# Patient Record
Sex: FEMALE | Race: ASIAN | NOT HISPANIC OR LATINO | Employment: UNEMPLOYED | URBAN - METROPOLITAN AREA
[De-identification: names, ages, dates, MRNs, and addresses within clinical notes are randomized per-mention and may not be internally consistent; named-entity substitution may affect disease eponyms.]

---
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
## 2019-03-29 ENCOUNTER — OFFICE VISIT (OUTPATIENT)
Dept: FAMILY MEDICINE | Facility: CLINIC | Age: 19
End: 2019-03-29
Payer: COMMERCIAL

## 2019-03-29 VITALS
WEIGHT: 228 LBS | HEIGHT: 63 IN | DIASTOLIC BLOOD PRESSURE: 95 MMHG | BODY MASS INDEX: 40.4 KG/M2 | HEART RATE: 65 BPM | TEMPERATURE: 98.6 F | RESPIRATION RATE: 16 BRPM | SYSTOLIC BLOOD PRESSURE: 144 MMHG | OXYGEN SATURATION: 100 %

## 2019-03-29 DIAGNOSIS — L03.113 CELLULITIS OF RIGHT UPPER EXTREMITY: Primary | ICD-10-CM

## 2019-03-29 PROCEDURE — 99213 OFFICE O/P EST LOW 20 MIN: CPT | Performed by: NURSE PRACTITIONER

## 2019-03-29 RX ORDER — CEPHALEXIN 500 MG/1
500 CAPSULE ORAL 4 TIMES DAILY
Qty: 40 CAPSULE | Refills: 0 | Status: SHIPPED | OUTPATIENT
Start: 2019-03-29 | End: 2019-04-08

## 2019-03-29 SDOH — HEALTH STABILITY: MENTAL HEALTH: HOW OFTEN DO YOU HAVE A DRINK CONTAINING ALCOHOL?: NEVER

## 2019-03-29 ASSESSMENT — MIFFLIN-ST. JEOR: SCORE: 1765.39

## 2019-03-29 ASSESSMENT — PAIN SCALES - GENERAL: PAINLEVEL: NO PAIN (0)

## 2019-03-29 NOTE — PROGRESS NOTES
SUBJECTIVE:   Honey Ramirez is a 19 year old female who presents to clinic today for the following health issues:      Concern - Insect bite  Onset: 2 days ago    Description:   Insect bite: right lower arm    Intensity: mild    Progression of Symptoms:  worsening    Accompanying Signs & Symptoms:  Itching, pain, redness and swelling    Previous history of similar problem:   None    Precipitating factors:   Worsened by: none    Alleviating factors:  Improved by: ice    Therapies Tried and outcome: Ice: relieves itching    No fever or myalgias. Good appetite.    Problem list and histories reviewed & adjusted, as indicated.  Additional history: as documented    There is no problem list on file for this patient.    History reviewed. No pertinent surgical history.    Social History     Tobacco Use     Smoking status: Current Some Day Smoker     Smokeless tobacco: Never Used   Substance Use Topics     Alcohol use: No     Frequency: Never     History reviewed. No pertinent family history.      Current Outpatient Medications   Medication Sig Dispense Refill     cephALEXin (KEFLEX) 500 MG capsule Take 1 capsule (500 mg) by mouth 4 times daily for 10 days 40 capsule 0     acetaminophen-codeine (TYLENOL #3) 300-30 MG per tablet Take 1 tablet by mouth every 6 hours as needed for severe pain (Patient not taking: Reported on 3/29/2019) 12 tablet 0     No Known Allergies    Reviewed and updated as needed this visit by clinical staff  Tobacco  Allergies  Meds  Problems  Med Hx  Surg Hx  Fam Hx  Soc Hx        Reviewed and updated as needed this visit by Provider  Tobacco  Allergies  Meds  Problems  Med Hx  Surg Hx  Fam Hx         ROS:  Constitutional, HEENT, cardiovascular, pulmonary, gi and gu systems are negative, except as otherwise noted.    OBJECTIVE:     BP (!) 144/95 (BP Location: Right arm, Patient Position: Chair, Cuff Size: Adult Large)   Pulse 65   Temp 98.6  F (37  C) (Oral)   Resp 16   Ht  "1.588 m (5' 2.5\")   Wt 103.4 kg (228 lb)   LMP  (LMP Unknown)   SpO2 100%   Breastfeeding? No   BMI 41.04 kg/m    Body mass index is 41.04 kg/m .  GENERAL: healthy, alert and no distress  RESP: lungs clear to auscultation - no rales, rhonchi or wheezes  CV: regular rate and rhythm, normal S1 S2, no S3 or S4, no murmur, click or rub, no peripheral edema and peripheral pulses strong  MS: no gross musculoskeletal defects noted, no edema  SKIN: erythema to right forearm. Slightly warm. No streaking  PSYCH: mentation appears normal, affect normal/bright    Diagnostic Test Results:  none     ASSESSMENT/PLAN:     1. Cellulitis of right upper extremity  Start cephalexin 1 tablet 4 times daily x10 days  If not improving by Monday, please me know and we'll add Bactrim  - cephALEXin (KEFLEX) 500 MG capsule; Take 1 capsule (500 mg) by mouth 4 times daily for 10 days  Dispense: 40 capsule; Refill: 0    See Patient Instructions    The benefits, risks and potential side effects were discussed in detail. Black box warnings discussed as relevant. All patient questions were answered. The patient was instructed to follow up immediately if any adverse reactions develop.    Return precautions discussed, including when to seek urgent/emergent care.    Patient verbalizes understanding and agrees with plan of care. Patient stable for discharge.      CASH Jean-Baptiste [facility]  "

## 2019-03-29 NOTE — LETTER
March 29, 2019      Honey Ramirez  [address] [facility] [address]        To Whom It May Concern:    Honey Ramirez  was seen on 3/29/2019.  Please excuse her due to illness.      Sincerely,        CASH Jean-Baptiste CNP

## 2019-03-29 NOTE — PATIENT INSTRUCTIONS
Start cephalexin 1 tablet 4 times daily x10 days  If not improving by Monday, please me know and we'll add Bactrim  Patient Education     Cellulitis  Cellulitis is an infection of the deep layers of skin. A break in the skin, such as a cut or scratch, can let bacteria under the skin. If the bacteria get to deep layers of the skin, it can be serious. If not treated, cellulitis can get into the bloodstream and lymph nodes. The infection can then spread throughout the body. This causes serious illness.  Cellulitis causes the affected skin to become red, swollen, warm, and sore. The reddened areas have a visible border. An open sore may leak fluid (pus). You may have a fever, chills, and pain.  Cellulitis is treated with antibiotics taken for 7 to 10 days. An open sore may be cleaned and covered with cool wet gauze. Symptoms should get better 1 to 2 days after treatment is started. Make sure to take all the antibiotics for the full number of days until they are gone. Keep taking the medicine even if your symptoms go away.  Home care  Follow these tips:    Limit the use of the part of your body with cellulitis.     If the infection is on your leg, keep your leg raised while sitting. This will help to reduce swelling.    Take all of the antibiotic medicine exactly as directed until it is gone. Do not miss any doses, especially during the first 7 days. Don t stop taking the medicine when your symptoms get better.    Keep the affected area clean and dry.    Wash your hands with soap and warm water before and after touching your skin. Anyone else who touches your skin should also wash his or her hands. Don't share towels.  Follow-up care  Follow up with your healthcare provider, or as advised. If your infection does not go away on the first antibiotic, your healthcare provider will prescribe a different one.  When to seek medical advice  Call your healthcare provider right away if any of these occur:    Red areas that  spread    Swelling or pain that gets worse    Fluid leaking from the skin (pus)    Fever higher of 100.4  F (38.0  C) or higher after 2 days on antibiotics  Date Last Reviewed: 9/1/2016 2000-2018 [employer]. [address] [place] [address], [place] [address]. All rights reserved. This information is not intended as a substitute for professional medical care. Always follow your healthcare professional's instructions.

## 2019-04-29 ENCOUNTER — ANCILLARY PROCEDURE (OUTPATIENT)
Dept: GENERAL RADIOLOGY | Facility: CLINIC | Age: 19
End: 2019-04-29
Attending: PEDIATRICS
Payer: COMMERCIAL

## 2019-04-29 ENCOUNTER — OFFICE VISIT (OUTPATIENT)
Dept: ORTHOPEDICS | Facility: CLINIC | Age: 19
End: 2019-04-29
Payer: COMMERCIAL

## 2019-04-29 VITALS
SYSTOLIC BLOOD PRESSURE: 138 MMHG | DIASTOLIC BLOOD PRESSURE: 82 MMHG | BODY MASS INDEX: 40.4 KG/M2 | HEIGHT: 63 IN | WEIGHT: 228 LBS

## 2019-04-29 DIAGNOSIS — M25.531 RIGHT WRIST PAIN: Primary | ICD-10-CM

## 2019-04-29 DIAGNOSIS — M25.531 RIGHT WRIST PAIN: ICD-10-CM

## 2019-04-29 PROCEDURE — 99203 OFFICE O/P NEW LOW 30 MIN: CPT | Performed by: PEDIATRICS

## 2019-04-29 PROCEDURE — 73110 X-RAY EXAM OF WRIST: CPT | Mod: RT

## 2019-04-29 ASSESSMENT — MIFFLIN-ST. JEOR: SCORE: 1765.39

## 2019-04-29 NOTE — PATIENT INSTRUCTIONS
Plan:  - Today's Plan of Care:  Medical Equipment: wrist brace right    -We also discussed other future treatment options:  MRI of the wrist or Rehab: Occupational Therapy    Follow Up: 1-2 weeks    If you have any further questions for your physician or physician s care team you can call [phone] and use option 3 to leave a voice message. Calls received during business hours will be returned same day.

## 2019-04-29 NOTE — LETTER
"    4/29/2019         RE: Honey Ramirez  [address] [address] [address]        Dear Colleague,    Thank you for referring your patient, Honey Ramirez, to the [facility]. Please see a copy of my visit note below.    Sports Medicine Clinic Visit    PCP: Clinic, [facility]    Honey Ramirez is a 19 year old female who is seen  as a self referral AIC presenting with right wrist pain    Injury: Reports right wrist pain for 1 day with no injury. 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  She reports her pain increases with gripping objects and with flexion and extension. She has mild swelling in the right wrist. She is right hand dominate.    Location of Pain: right wrist  Duration of Pain: 1 day(s)  Rating of Pain at worst: 6/10  Rating of Pain Currently: 2/10  Symptoms are better with: rest  Symptoms are worse with: extension, flexion and gripping  Additional Features:   Positive: swelling and weakness   Negative: bruising, popping, grinding, catching, locking, instability, paresthesias and numbness  Other evaluation and/or treatments so far consists of: Nothing  Prior History of related problems: nothing    Social History: Host at a restaurant    Review of Systems  Skin: no bruising, yes swelling  Musculoskeletal: as above  Neurologic: no numbness, paresthesias  Remainder of review of systems is negative including constitutional, CV, pulmonary, GI, except as noted in HPI or medical history.    Patient's current problem list, past medical and surgical history, and family history were reviewed.    There is no problem list on file for this patient.    No past medical history on file.  No past surgical history on file.  No family history on file.      Objective  /82 (BP Location: Left arm, Patient Position: Chair, Cuff Size: Adult Regular)   Ht 1.588 m (5' 2.5\")   Wt 103.4 kg (228 lb)   BMI 41.04 kg/m       GENERAL APPEARANCE: healthy, alert and no " distress   GAIT: NORMAL  SKIN: no suspicious lesions or rashes  HEENT: Sclera clear, anicteric  CV: good peripheral pulses  RESP: Breathing not labored  NEURO: Normal strength and tone, mentation intact and speech normal  PSYCH:  mentation appears normal and affect normal/bright    Bilateral Wrist and Hand exam  Inspection:       No swelling, bruising or deformity bilateral    Tender:       Mild global dorsal and volar wrist pain    Non Tender:       distal radius right,      anatomic snuffbox right and      Distal ulna right    ROM:       Full and symmetric active and passive range of motion of the forearm, wrist and digits bilateral    Strength:       5/5 strength in the muscles of the hand, wrist and forearm bilateral    Special Tests:        neg (-) Tinel's test bilateral and       neg (-) Phalen's test bilateral    Neurovascular:       2+ radial pulses bilaterally with brisk capillary refill and      normal sensation to light touch in the radial, median and ulnar nerve distributions      Radiology  I ordered, visualized and reviewed these images with the patient  3 XR views of right wrist reviewed: no acute bony abnormality, no significant degenerative change  - will follow official read    Assessment:  1. Right wrist pain      Right wrist pain, overall reassuring exam and x-rays.  Recommend brace for 1-2 weeks, would consider therapy or MRI pending clinical course.    Plan:  - Today's Plan of Care:  Medical Equipment: wrist brace right    -We also discussed other future treatment options:  MRI of the wrist or Rehab: Occupational Therapy    Follow Up: 1-2 weeks    Concerning signs and symptoms were reviewed.  The patient expressed understanding of this management plan and all questions were answered at this time.    Gema Joy MD [facility]  Primary Care Sports Medicine  [facility]    Again, thank you for allowing me to participate in the care of your patient.         Sincerely,        Gema Joy MD

## 2019-04-29 NOTE — PROGRESS NOTES
"Sports Medicine Clinic Visit    PCP: Clinic, 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  She reports her pain increases with gripping objects and with flexion and extension. She has mild swelling in the right wrist. She is right hand dominate.    Location of Pain: right wrist  Duration of Pain: 1 day(s)  Rating of Pain at worst: 6/10  Rating of Pain Currently: 2/10  Symptoms are better with: rest  Symptoms are worse with: extension, flexion and gripping  Additional Features:   Positive: swelling and weakness   Negative: bruising, popping, grinding, catching, locking, instability, paresthesias and numbness  Other evaluation and/or treatments so far consists of: Nothing  Prior History of related problems: nothing    Social History: Host at a restaurant    Review of Systems  Skin: no bruising, yes swelling  Musculoskeletal: as above  Neurologic: no numbness, paresthesias  Remainder of review of systems is negative including constitutional, CV, pulmonary, GI, except as noted in HPI or medical history.    Patient's current problem list, past medical and surgical history, and family history were reviewed.    There is no problem list on file for this patient.    No past medical history on file.  No past surgical history on file.  No family history on file.      Objective  /82 (BP Location: Left arm, Patient Position: Chair, Cuff Size: Adult Regular)   Ht 1.588 m (5' 2.5\")   Wt 103.4 kg (228 lb)   BMI 41.04 kg/m      GENERAL APPEARANCE: healthy, alert and no distress   GAIT: NORMAL  SKIN: no suspicious lesions or rashes  HEENT: Sclera clear, anicteric  CV: good peripheral pulses  RESP: Breathing not labored  NEURO: Normal strength and tone, mentation intact and speech normal  PSYCH:  mentation appears normal and affect " normal/bright    Bilateral Wrist and Hand exam  Inspection:       No swelling, bruising or deformity bilateral    Tender:       Mild global dorsal and volar wrist pain    Non Tender:       distal radius right,      anatomic snuffbox right and      Distal ulna right    ROM:       Full and symmetric active and passive range of motion of the forearm, wrist and digits bilateral    Strength:       5/5 strength in the muscles of the hand, wrist and forearm bilateral    Special Tests:        neg (-) Tinel's test bilateral and       neg (-) Phalen's test bilateral    Neurovascular:       2+ radial pulses bilaterally with brisk capillary refill and      normal sensation to light touch in the radial, median and ulnar nerve distributions      Radiology  I ordered, visualized and reviewed these images with the patient  3 XR views of right wrist reviewed: no acute bony abnormality, no significant degenerative change  - will follow official read    Assessment:  1. Right wrist pain      Right wrist pain, overall reassuring exam and x-rays.  Recommend brace for 1-2 weeks, would consider therapy or MRI pending clinical course.    Plan:  - Today's Plan of Care:  Medical Equipment: wrist brace right    -We also discussed other future treatment options:  MRI of the wrist or Rehab: Occupational Therapy    Follow Up: 1-2 weeks    Concerning signs and symptoms were reviewed.  The patient expressed understanding of this management plan and all questions were answered at this time.    Gema Joy MD [facility]  Primary Care Sports Medicine  [facility]

## 2019-04-29 NOTE — LETTER
April 29, 2019      Honey Yoo Ashley  [address] [address] [address]        To Whom It May Concern,      Honey was seen for wrist pain.  Off work today, may return 4/30, please allow wrist brace use.    Follow up will be in 1-2 weeks.          Sincerely,        Gema Joy MD

## 2019-06-10 ENCOUNTER — OFFICE VISIT (OUTPATIENT)
Dept: URGENT CARE | Facility: URGENT CARE | Age: 19
End: 2019-06-10

## 2019-06-10 VITALS
BODY MASS INDEX: 41.76 KG/M2 | RESPIRATION RATE: 26 BRPM | HEART RATE: 75 BPM | WEIGHT: 232 LBS | TEMPERATURE: 98.8 F | OXYGEN SATURATION: 100 % | SYSTOLIC BLOOD PRESSURE: 160 MMHG | DIASTOLIC BLOOD PRESSURE: 93 MMHG
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
## 2019-06-10 DIAGNOSIS — M62.838 NECK MUSCLE SPASM: Primary | ICD-10-CM

## 2019-06-10 DIAGNOSIS — M62.830 BACK MUSCLE SPASM: ICD-10-CM

## 2019-06-10 PROBLEM — T75.3XXA MOTION SICKNESS: Status: ACTIVE | Noted: 2017-01-12

## 2019-06-10 PROBLEM — J45.20 ASTHMA, INTERMITTENT: Status: ACTIVE | Noted: 2019-06-10

## 2019-06-10 PROCEDURE — 99213 OFFICE O/P EST LOW 20 MIN: CPT | Performed by: PHYSICIAN ASSISTANT

## 2019-06-10 RX ORDER — CYCLOBENZAPRINE HCL 10 MG
10 TABLET ORAL 3 TIMES DAILY PRN
Qty: 30 TABLET | Refills: 0 | Status: SHIPPED | OUTPATIENT
Start: 2019-06-10 | End: 2019-12-03

## 2019-06-10 ASSESSMENT — ENCOUNTER SYMPTOMS
WEAKNESS: 0
COUGH: 0
RESPIRATORY NEGATIVE: 1
VOMITING: 0
JOINT SWELLING: 0
ENDOCRINE NEGATIVE: 1
LIGHT-HEADEDNESS: 0
ARTHRALGIAS: 1
NAUSEA: 0
BRUISES/BLEEDS EASILY: 0
FEVER: 0
DIARRHEA: 0
WOUND: 0
HEMATOLOGIC/LYMPHATIC NEGATIVE: 1
SORE THROAT: 0
SHORTNESS OF BREATH: 0
CHILLS: 0
EYES NEGATIVE: 1
NECK PAIN: 1
RHINORRHEA: 0
ALLERGIC/IMMUNOLOGIC NEGATIVE: 1
MYALGIAS: 1
BACK PAIN: 1
PALPITATIONS: 0
NECK STIFFNESS: 0
DIZZINESS: 0
CARDIOVASCULAR NEGATIVE: 1
HEADACHES: 0
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
## 2019-07-29 ENCOUNTER — TELEPHONE (OUTPATIENT)
Dept: FAMILY MEDICINE | Facility: CLINIC | Age: 19
End: 2019-07-29

## 2019-07-29 NOTE — TELEPHONE ENCOUNTER
Panel Management Review   One phone call and send letter if unable to reach them or [employer] message and send letter if not read after 2 weeks (You will get a message to your inbasket)      BP Readings from Last 1 Encounters:   06/10/19 (!) 160/93    , No results found for: A1C, 3/29/2019    Health Maintenance Due   Topic Date Due     PREVENTIVE CARE VISIT  1999     CHLAMYDIA SCREENING  1999     ASTHMA ACTION PLAN  1999     ASTHMA CONTROL TEST  1999     HIV SCREENING  02/19/2014        Fail List measure: asthma         Patient is due/failing the following:   ACT    Action needed:   Patient needs to do ACT.    Type of outreach:    Copy of ACT mailed to patient, will reach out in 5 days.    Questions for provider review:    None                                                                                       Chart routed to n/jose White

## 2019-07-29 NOTE — LETTER
July 29, 2019      Honey Ramirez  [address] [address] [address]        Dear Honey Ramirez,      At [facility] we care about your health and are committed to providing quality patient care. It has come to our attention that you are due for an Asthma Control Test.     This screening tool helps us to assess how well your asthma is controlled. Good asthma control leads to less asthma symptoms and greater health. If your asthma is not in good control (score less than 20) it is recommended you be seen by your provider for medication and lifestyle adjustments    We have enclosed an  Asthma Control Test. Please complete the enclosed asthma control test even if you are feeling well. You can mail it back to our clinic or drop if off at our .     Thank you for your time and we hope this letter finds you well!      Sincerely,    Your Team at [facility]

## 2019-08-13 ASSESSMENT — ASTHMA QUESTIONNAIRES: ACT_TOTALSCORE: 23

## 2019-10-03 ENCOUNTER — HEALTH MAINTENANCE LETTER (OUTPATIENT)
Age: 19
End: 2019-10-03

## 2019-12-03 ENCOUNTER — OFFICE VISIT (OUTPATIENT)
Dept: OBGYN | Facility: CLINIC | Age: 19
End: 2019-12-03
Payer: COMMERCIAL

## 2019-12-03 VITALS
SYSTOLIC BLOOD PRESSURE: 123 MMHG | WEIGHT: 223 LBS | HEART RATE: 69 BPM | DIASTOLIC BLOOD PRESSURE: 80 MMHG | BODY MASS INDEX: 40.14 KG/M2

## 2019-12-03 DIAGNOSIS — Z34.01 ENCOUNTER FOR SUPERVISION OF NORMAL FIRST PREGNANCY IN FIRST TRIMESTER: ICD-10-CM

## 2019-12-03 DIAGNOSIS — N91.2 AMENORRHEA: Primary | ICD-10-CM

## 2019-12-03 LAB
B-HCG SERPL-ACNC: ABNORMAL IU/L (ref 0–5)
HCG UR QL: POSITIVE

## 2019-12-03 PROCEDURE — 99201 ZZC OFFICE/OUTPT VISIT, NEW, LEVEL I: CPT | Performed by: ADVANCED PRACTICE MIDWIFE

## 2019-12-03 PROCEDURE — 84702 CHORIONIC GONADOTROPIN TEST: CPT | Performed by: ADVANCED PRACTICE MIDWIFE

## 2019-12-03 PROCEDURE — 81025 URINE PREGNANCY TEST: CPT | Performed by: ADVANCED PRACTICE MIDWIFE

## 2019-12-03 PROCEDURE — 36415 COLL VENOUS BLD VENIPUNCTURE: CPT | Performed by: ADVANCED PRACTICE MIDWIFE

## 2019-12-03 NOTE — NURSING NOTE
"Chief Complaint   Patient presents with     Confirmation Of Pregnancy       Initial /80 (BP Location: Right arm, Patient Position: Sitting, Cuff Size: Adult Large)   Pulse 69   Wt 101.2 kg (223 lb)   LMP 10/01/2019 (Approximate)   BMI 40.14 kg/m   Estimated body mass index is 40.14 kg/m  as calculated from the following:    Height as of 4/29/19: 1.588 m (5' 2.5\").    Weight as of this encounter: 101.2 kg (223 lb).  Medication Reconciliation: complete    Angela Ortiz CMA    "

## 2019-12-03 NOTE — PROGRESS NOTES
Honey Ramirez is a 19 year old who presents to the clinic for confirmation of pregnancy.   Patient's last menstrual period was 10/01/2019 (approximate).  Normally menses are every 28 days.  Has had episodes of amenorrhea in the past but has had monthly menses since going off BC one year ago.   First positive pregnancy test at home was December 3, 2019   Had a negative pregnancy test 11/1.  Partner is choosing not to be involved and is encouraging termination which Honey is apposed to.   She has support from her family.   Worried about the stress this is causing.   Some issues with nausea and vomiting though states that she isn't throwing up food but can vomit 5-6 times a day.   Thinks weight loss is perhaps 7 lbs.            Patient Active Problem List   Diagnosis     Asthma, intermittent     Class 3 obesity due to excess calories without serious comorbidity with body mass index (BMI) of 40.0 to 44.9 in adult     Motion sickness     Amenorrhea, secondary     Past Medical History:   Diagnosis Date     Amenorrhea      Asthma      Past Surgical History:   Procedure Laterality Date     NO HISTORY OF SURGERY       No current outpatient medications on file.     No Known Allergies  Social History     Socioeconomic History     Marital status: Single     Spouse name: Not on file     Number of children: Not on file     Years of education: Not on file     Highest education level: Not on file   Occupational History     Not on file   Social Needs     Financial resource strain: Not on file     Food insecurity:     Worry: Not on file     Inability: Not on file     Transportation needs:     Medical: Not on file     Non-medical: Not on file   Tobacco Use     Smoking status: Never Smoker     Smokeless tobacco: Former User     Tobacco comment: stopped vaping 11/2019   Substance and Sexual Activity     Alcohol use: No     Frequency: Never     Drug use: No     Sexual activity: Yes     Partners: Male     Birth control/protection:  None   Lifestyle     Physical activity:     Days per week: Not on file     Minutes per session: Not on file     Stress: Not on file   Relationships     Social connections:     Talks on phone: Not on file     Gets together: Not on file     Attends Rastafarian service: Not on file     Active member of club or organization: Not on file     Attends meetings of clubs or organizations: Not on file     Relationship status: Not on file     Intimate partner violence:     Fear of current or ex partner: Not on file     Emotionally abused: Not on file     Physically abused: Not on file     Forced sexual activity: Not on file   Other Topics Concern     Parent/sibling w/ CABG, MI or angioplasty before 65F 55M? Not Asked   Social History Narrative     Not on file     Family History   Problem Relation Age of Onset     Diabetes Mother      Diabetes Father      Polycystic ovary syndrome Sister      Chronic Obstructive Pulmonary Disease Maternal Grandfather      Polycystic ovary syndrome Sister         ROS: 10 point ROS neg other than the symptoms noted above in the HPI.          /80 (BP Location: Right arm, Patient Position: Sitting, Cuff Size: Adult Large)   Pulse 69   Wt 101.2 kg (223 lb)   LMP 10/01/2019 (Approximate)   BMI 40.14 kg/m          UPT today is positive    ASSESSMENT:        (N91.2) Amenorrhea  (primary encounter diagnosis)  Comment:   Plan: HCG Qual, Urine ([id number]), HCG Quantitative         Pregnancy, Blood ([id number]), US OB < 14 Weeks         Single            (Z34.01) Encounter for supervision of normal first pregnancy in first trimester  Comment:   Plan: US OB < 14 Weeks Single                  We reviewed:  CMV  Listeria precautions  Zika   Nausea remedies (see AVS for complete instructions)    Recommended PNV daily and following a generally healthy lifestyle.  Ultrasound at 7 weeks.   To call with protracted n/v or vaginal bleeding  Follow up visit at 8-10 weeks gestation    Dating is uncertain given her  negative pregnancy test one month ago.   Will plan beta and ultrasound to confirm.     RTC one week for a weight check.  Reviewed nausea remedies on AVS.

## 2019-12-03 NOTE — PATIENT INSTRUCTIONS
Thank for choosing our clinic for your health care needs. Our goal is to provided you with excellent care. One way that we continue to improve our care is by listening to our patients. Please take a few minutes to complete the written survey that you may receive in the mail after your visit.     You may reach your care team by calling the following number:    [place]- [phone]  [place] [phone]  For clinic hours at [facility]  please visit the [facility] web site [email or web address]    Notification of your lab results:  Normal or non-critical lab and imaging results will be communicated to you by Mychart, letter, or phone within 7 days. If you do not hear from us within 10 days, please contact us through [employer] or phone. If you have a critical or abnormal lab result, we will notify you by phone as soon as possible.  Pap smears often take a bit longer.     After Hours nurse advice:  [facility]:  [phone]     Medication Refills:  Please contact your pharmacy and they will forward the refill to us. Please allow 3 business days for your refills to be completed.     Secure access to your medical record:  Use [employer] (secure email communication and access to your chart) to send your primary care provider a message or make an appointment. Ask someone on your Team how to sign up for [employer]. To log on to [employer] or for more information in [employer] please visit the website at [email or web address].            How to prevent CMV or cytomegalovirus infection while you are pregnant:    Thoroughly wash your hands with soap and warm water especially after doing things such as:  Diaper changes  Feeding or bathing a child  Wiping a child's runny nose or drool  Handling a child's toys    Do not share cups, plates, utensils, toothbrushes or food with your children  Do not kiss young children on the mouth or cheek. Instead, kiss them on the head or give them a hug.  Do not share towels or  washcloths with young children.  Clean toy, counter tops, and other surfaces that come in contact with urine or saliva.        LISTERIA  Individuals can reduce the risk for listeria contamination through proper food selection, handling, and storage, such as:    Rinsing raw produce (fuits and vegetables) before eating, cutting, or cooking;    Keeping refrigerators at 40 degrees F or lower;    Buying soft cheeses only if their labels state that they are made with pasteurized milk.  Also avoiding cheese that has not been initially wrapped in plastic.  These cheeses include brie, camembert, blue and the soft Mexican cheeses like con queso.    Heating all food that can be heated but especially hot dogs, luncheon meats, and cold cuts to an internal temperature of 165 degrees F or until steaming hot before serving them; and    Washing your hands for at least 20 seconds with warm water and soap before and after handling cantaloupes or other melons.    Watch for food recalls for listeria and contact us if you believe you have been exposed.               First line remedies for nausea in pregnancy    Eat small frequent meals every 2-3 hours if possible.   Avoid food at extremes of temparture and drinks with carbination.  Eat foods that appeal to you, avoiding fats and spicy foods.  Bread, pasta, crackers, potatoes, and rice tend to be tolerated the best.  Don't worry about what you eat in the first 3 months, it is more important that you can eat and keep it down.   Try flat ginger ale.  Ginger is a herbal remedy for nausea and you can use it in any form.  There are ginger tablets you can purchase.  The dose is 500 to 1000 mg a day.   You may also try doxylamine 12.5 mg three times a day which is a sleeping medication along with Vitamin B6 25 mg three times a day.  This combination takes up to a week to work so give it some time.  Be sure to take both the doxylamine and B6  Doxylamine is sometimes called Unisom and it comes in  25 mg tablets so you will have to break it in half and take half a tablet.   It is important to take the Unisom and B6 together or it won't be effective.  If you begin to vomit more than 5 or 6 times a day and feel that you are unable to keep anything down, call the clinic for an appointment to be seen.                Fruits and vegetables high in pesticide contamination  Strawberries  Spinach  Kale  Nectarines  Peaches  Apples  Grapes  Cherries  Pears  Tomatoes  Celery  Potatoes  Hot Peppers.     Consider extra washing of these fruits and vegetables, peeling if possible or purchasing organics.     Fruits and vegetables lowest if pesticide contramination:  Avocado  Sweet corn  Pineapple  Cabbage   Onions   Frozen peas  Papaya  Asparagus  Mushrooms  Eggplant  Honeydew  Kiwi  Cantaloupe  Cauliflower  Broccoli      Consider eliminating or reducing the following additives in personal care products and make up:  Triclosan  Paraben  Phthalates  Phenols  Products that do not contain these additives are often found in the organic or green sections of stores.

## 2019-12-12 ENCOUNTER — ANCILLARY PROCEDURE (OUTPATIENT)
Dept: ULTRASOUND IMAGING | Facility: CLINIC | Age: 19
End: 2019-12-12
Attending: ADVANCED PRACTICE MIDWIFE
Payer: COMMERCIAL

## 2019-12-12 DIAGNOSIS — Z34.01 ENCOUNTER FOR SUPERVISION OF NORMAL FIRST PREGNANCY IN FIRST TRIMESTER: ICD-10-CM

## 2019-12-12 DIAGNOSIS — N91.2 AMENORRHEA: ICD-10-CM

## 2019-12-12 PROCEDURE — 76801 OB US < 14 WKS SINGLE FETUS: CPT

## 2019-12-20 ENCOUNTER — TELEPHONE (OUTPATIENT)
Dept: OBGYN | Facility: CLINIC | Age: 19
End: 2019-12-20

## 2019-12-20 NOTE — TELEPHONE ENCOUNTER
Reason for Call:  Other order for an MRI    Detailed comments: patient is calling to ask for a MRI order to check the gender for the baby    Phone Number Patient can be reached at: Home number on file [phone] (home)    Best Time:     Can we leave a detailed message on this number? YES    Call taken on 12/20/2019 at 8:59 AM by Suzette Obando

## 2019-12-20 NOTE — TELEPHONE ENCOUNTER
Pt was seen on 12/3/19 with Lizzy Guzmán CNM, for a pregnancy confirmation appt.  Lizzy did order pt an US OB <14 weeks which was completed on 12/12/19.  No new prenatal visit scheduled.    Left pt a detailed message letting her know we do not order MRI's to determine her baby's gender.  I explained to her that her OB provider will order a 20 week ultrasound and she can find out the gender at that time.  I also advised pt to schedule a new prenatal appointment for when she is 10-12 weeks gestation.    Charlotte Hirsch RN

## 2019-12-26 ENCOUNTER — PRENATAL OFFICE VISIT (OUTPATIENT)
Dept: OBGYN | Facility: CLINIC | Age: 19
End: 2019-12-26
Payer: COMMERCIAL

## 2019-12-26 VITALS
WEIGHT: 225 LBS | DIASTOLIC BLOOD PRESSURE: 85 MMHG | HEART RATE: 74 BPM | SYSTOLIC BLOOD PRESSURE: 125 MMHG | BODY MASS INDEX: 40.5 KG/M2

## 2019-12-26 DIAGNOSIS — Z34.00 SUPERVISION OF NORMAL FIRST PREGNANCY, ANTEPARTUM: ICD-10-CM

## 2019-12-26 LAB
ABO + RH BLD: NORMAL
ABO + RH BLD: NORMAL
BASOPHILS # BLD AUTO: 0 10E9/L (ref 0–0.2)
BASOPHILS NFR BLD AUTO: 0.3 %
BLD GP AB SCN SERPL QL: NORMAL
BLOOD BANK CMNT PATIENT-IMP: NORMAL
DIFFERENTIAL METHOD BLD: NORMAL
EOSINOPHIL # BLD AUTO: 0.1 10E9/L (ref 0–0.7)
EOSINOPHIL NFR BLD AUTO: 1.5 %
ERYTHROCYTE [DISTWIDTH] IN BLOOD BY AUTOMATED COUNT: 12.2 % (ref 10–15)
HBV SURFACE AG SERPL QL IA: NONREACTIVE
HCT VFR BLD AUTO: 38.5 % (ref 35–47)
HGB BLD-MCNC: 12.7 G/DL (ref 11.7–15.7)
HIV 1+2 AB+HIV1 P24 AG SERPL QL IA: NONREACTIVE
LYMPHOCYTES # BLD AUTO: 1.9 10E9/L (ref 0.8–5.3)
LYMPHOCYTES NFR BLD AUTO: 20.1 %
MCH RBC QN AUTO: 29.2 PG (ref 26.5–33)
MCHC RBC AUTO-ENTMCNC: 33 G/DL (ref 31.5–36.5)
MCV RBC AUTO: 89 FL (ref 78–100)
MONOCYTES # BLD AUTO: 0.7 10E9/L (ref 0–1.3)
MONOCYTES NFR BLD AUTO: 7.1 %
NEUTROPHILS # BLD AUTO: 6.8 10E9/L (ref 1.6–8.3)
NEUTROPHILS NFR BLD AUTO: 71 %
PLATELET # BLD AUTO: 299 10E9/L (ref 150–450)
RBC # BLD AUTO: 4.35 10E12/L (ref 3.8–5.2)
SPECIMEN EXP DATE BLD: NORMAL
WBC # BLD AUTO: 9.6 10E9/L (ref 4–11)

## 2019-12-26 PROCEDURE — 86901 BLOOD TYPING SEROLOGIC RH(D): CPT | Performed by: NURSE PRACTITIONER

## 2019-12-26 PROCEDURE — 87389 HIV-1 AG W/HIV-1&-2 AB AG IA: CPT | Performed by: NURSE PRACTITIONER

## 2019-12-26 PROCEDURE — 86780 TREPONEMA PALLIDUM: CPT | Performed by: NURSE PRACTITIONER

## 2019-12-26 PROCEDURE — 36415 COLL VENOUS BLD VENIPUNCTURE: CPT | Performed by: NURSE PRACTITIONER

## 2019-12-26 PROCEDURE — 86850 RBC ANTIBODY SCREEN: CPT | Performed by: NURSE PRACTITIONER

## 2019-12-26 PROCEDURE — 99207 ZZC FIRST OB VISIT: CPT | Performed by: NURSE PRACTITIONER

## 2019-12-26 PROCEDURE — 87340 HEPATITIS B SURFACE AG IA: CPT | Performed by: NURSE PRACTITIONER

## 2019-12-26 PROCEDURE — 87086 URINE CULTURE/COLONY COUNT: CPT | Performed by: NURSE PRACTITIONER

## 2019-12-26 PROCEDURE — 86900 BLOOD TYPING SEROLOGIC ABO: CPT | Performed by: NURSE PRACTITIONER

## 2019-12-26 PROCEDURE — 87591 N.GONORRHOEAE DNA AMP PROB: CPT | Performed by: NURSE PRACTITIONER

## 2019-12-26 PROCEDURE — 86762 RUBELLA ANTIBODY: CPT | Performed by: NURSE PRACTITIONER

## 2019-12-26 PROCEDURE — 87491 CHLMYD TRACH DNA AMP PROBE: CPT | Performed by: NURSE PRACTITIONER

## 2019-12-26 PROCEDURE — 85025 COMPLETE CBC W/AUTO DIFF WBC: CPT | Performed by: NURSE PRACTITIONER

## 2019-12-26 RX ORDER — PNV NO.95/FERROUS FUM/FOLIC AC 28MG-0.8MG
1 TABLET ORAL DAILY
COMMUNITY
End: 2020-09-01

## 2019-12-26 NOTE — PROGRESS NOTES
Honey is a 19 year old  @ 10 weeks here for new OB visit. FOB is did not desire for patient to continue pregnancy, so is not currently involved.    See OB questionnaire for pertinent components of HPI.    OBhx: never pregnant  Gyne: history of STD: Chlamydia x 2  Past Medical History:   Diagnosis Date     Amenorrhea      Asthma      Past Surgical History:   Procedure Laterality Date     NO HISTORY OF SURGERY       Patient Active Problem List    Diagnosis Date Noted     Pregnancy, supervision, normal, first 2019     Priority: Medium     Asthma, intermittent 06/10/2019     Priority: Medium     Class 3 obesity due to excess calories without serious comorbidity with body mass index (BMI) of 40.0 to 44.9 in adult 2017     Priority: Medium     Motion sickness 2017     Priority: Medium     Amenorrhea, secondary 2017     Priority: Medium     Overview:   Refer her to ob/gyn        No Known Allergies  Current Outpatient Medications   Medication Sig Dispense Refill     Prenatal Vit-Fe Fumarate-FA (PRENATAL VITAMIN) 27-0.8 MG TABS Take 1 tablet by mouth daily         Review of Systems:     CONSTITUTIONAL:NEGATIVE for fever, chills, change in weight  INTEGUMENTARY/SKIN: NEGATIVE for worrisome rashes, moles or lesions  EYES: NEGATIVE for vision changes or irritation  ENT/MOUTH: NEGATIVE for ear, mouth and throat problems  RESP:NEGATIVE for significant cough or SOB  BREAST: NEGATIVE for masses, tenderness or discharge  CV: NEGATIVE for chest pain, palpitations or peripheral edema  GI: NEGATIVE for abdominal pain, heartburn, or change in bowel habits and POSITIVE for nausea-improving in the last week  :  Denies current vaginal bleeding, abnormal vaginal discharge  NEURO: NEGATIVE for weakness, dizziness or paresthesias  HEME/ALLERGY/IMMUNE: NEGATIVE for bleeding problems  PSYCHIATRIC: NEGATIVE for changes in mood or affect      Past Medical History of Father of Baby: No significant medical  history  History Since Last Menstrual Period: Nausea and Vomiting-improving    Physical Exam: /85   Pulse 74   Wt 102.1 kg (225 lb)   LMP 10/01/2019 (Approximate)   Breastfeeding No   BMI 40.50 kg/m    General: Well developed, well nourished female and Obese  Skin: Normal  HEENT: Normal  Neck: Supple,no adenopathy,thyroid normal  Chest: Clear to auscultation  Heart: Regular rate, rhythm,No murmur, rub, gallop  Abdomen: Benign and No masses, organomegaly    Extremities: Normal  Neurological: Normal   Perineum: Intact   Vulva: Normal  Vagina: Normal mucosa, no discharge  Cervix: Nulliparous, closed, mobile,  no discharge  Uterus: 10 weeks, Normal shape, position and consistency   Adnexa: Normal  Bony Pelvis: Adequate     A/P 19 year old  at 10 weeks  Discussed physician coverage, tertiary support, diet, exercise, weight gain, schedule of visits, routine and indicated ultrasounds, and childbirth education.  Prenatal labs: Prenatal Panel, GC, Chlamydia, Urine Culture.  Continue taking prenatal vitamins  Discussed maternal quad screening between 15-20 weeks and reviewed benefits and limitations.  Current BMI 40.5-monitor in the event anesthesia referral needed.     Kinsey CASTREJON CNP

## 2019-12-27 LAB
BACTERIA SPEC CULT: NORMAL
C TRACH DNA SPEC QL NAA+PROBE: NEGATIVE
N GONORRHOEA DNA SPEC QL NAA+PROBE: NEGATIVE
RUBV IGG SERPL IA-ACNC: 29 IU/ML
SPECIMEN SOURCE: NORMAL
T PALLIDUM AB SER QL: NONREACTIVE

## 2020-01-16 ENCOUNTER — PRENATAL OFFICE VISIT (OUTPATIENT)
Dept: OBGYN | Facility: CLINIC | Age: 20
End: 2020-01-16
Payer: COMMERCIAL

## 2020-01-16 VITALS
BODY MASS INDEX: 39.69 KG/M2 | HEART RATE: 75 BPM | DIASTOLIC BLOOD PRESSURE: 85 MMHG | TEMPERATURE: 98.4 F | WEIGHT: 224 LBS | OXYGEN SATURATION: 98 % | HEIGHT: 63 IN | SYSTOLIC BLOOD PRESSURE: 132 MMHG

## 2020-01-16 DIAGNOSIS — Z23 NEED FOR PROPHYLACTIC VACCINATION AND INOCULATION AGAINST INFLUENZA: ICD-10-CM

## 2020-01-16 DIAGNOSIS — Z34.00 SUPERVISION OF NORMAL FIRST PREGNANCY, ANTEPARTUM: Primary | ICD-10-CM

## 2020-01-16 PROCEDURE — 90471 IMMUNIZATION ADMIN: CPT | Performed by: NURSE PRACTITIONER

## 2020-01-16 PROCEDURE — 90686 IIV4 VACC NO PRSV 0.5 ML IM: CPT | Performed by: NURSE PRACTITIONER

## 2020-01-16 PROCEDURE — 99207 ZZC PRENATAL VISIT: CPT | Performed by: NURSE PRACTITIONER

## 2020-01-16 ASSESSMENT — MIFFLIN-ST. JEOR: SCORE: 1755.19

## 2020-01-16 ASSESSMENT — PAIN SCALES - GENERAL: PAINLEVEL: NO PAIN (0)

## 2020-01-16 NOTE — PROGRESS NOTES
Patient presents for routine prenatal visit. Prenatal flowsheet reviewed and updated as needed.  Denies vaginal bleeding, loss of fluid, contractions or cramping.  FOB is present for visit today.  Patient without complaint. On return to clinic discussed option for Maternal Quad screening and will then order screening ultrasound.  We discussed seasonal influenza vaccination and recommendations. At this time, patient accepts vaccination.    Advice as per Anticipatory Guidance/Checklist updated.  PE: See OB vitals    Questions asked and answered. Next OB visit in 4 week(s) with Dr. Leonardo.    Kinsey CASTREJON CNP

## 2020-01-21 ENCOUNTER — OFFICE VISIT (OUTPATIENT)
Dept: OBGYN | Facility: CLINIC | Age: 20
End: 2020-01-21
Payer: COMMERCIAL

## 2020-01-21 VITALS
HEART RATE: 82 BPM | WEIGHT: 223 LBS | DIASTOLIC BLOOD PRESSURE: 82 MMHG | SYSTOLIC BLOOD PRESSURE: 118 MMHG | BODY MASS INDEX: 39.5 KG/M2

## 2020-01-21 DIAGNOSIS — K59.01 SLOW TRANSIT CONSTIPATION: Primary | ICD-10-CM

## 2020-01-21 DIAGNOSIS — Z34.02 ENCOUNTER FOR SUPERVISION OF NORMAL FIRST PREGNANCY IN SECOND TRIMESTER: ICD-10-CM

## 2020-01-21 PROCEDURE — 99207 ZZC PRENATAL VISIT: CPT | Performed by: ADVANCED PRACTICE MIDWIFE

## 2020-01-21 NOTE — PATIENT INSTRUCTIONS
Drink 8-10 glasses of water a day.   Try to walk or exercise daily.   Avoid foods that will constipate like bananas, apples, tea and rice.  Stop prenatal vitamin for now.   For immediate relief try an fleet enema or glycerine or dulcolax suppository.   For over night relief try senokot or dulcolax tablets.   Consider taking miralax every 1-3 days to keep stools sort and easy to pass.

## 2020-01-21 NOTE — NURSING NOTE
"Chief Complaint   Patient presents with     Prenatal Care     abdominal and low back pain       Initial /82 (BP Location: Right arm, Patient Position: Sitting, Cuff Size: Adult Large)   Pulse 82   Wt 93.4 kg (206 lb)   LMP 10/01/2019 (Approximate)   BMI 36.49 kg/m   Estimated body mass index is 36.49 kg/m  as calculated from the following:    Height as of 1/16/20: 1.6 m (5' 3\").    Weight as of this encounter: 93.4 kg (206 lb).  Medication Reconciliation: complete    Angela Ortiz CMA    "

## 2020-01-21 NOTE — PROGRESS NOTES
Here with complaints of cramping and back pain.   Has been very constipated.  Feels often like she has to go but goes small amounts that are hard and hard to pass.   Discussed diet and options for relief.   Will do quad but is too early.   Has appointment for next month and will do it then.   RTC 3 weeks.   Lizzy Guzmán, CASH, CNM

## 2020-01-21 NOTE — LETTER
January 21, 2020      RE: Honey Ramirez  [address] [facility] [address]       To whom it may concern:    Honey Ramirez was seen in our clinic today. She  may return to work 1/27.  Sincerely,      Lizzy Guzmán CNM

## 2020-02-13 ENCOUNTER — PRENATAL OFFICE VISIT (OUTPATIENT)
Dept: OBGYN | Facility: CLINIC | Age: 20
End: 2020-02-13
Payer: COMMERCIAL

## 2020-02-13 VITALS
OXYGEN SATURATION: 98 % | WEIGHT: 220 LBS | HEIGHT: 63 IN | BODY MASS INDEX: 38.98 KG/M2 | HEART RATE: 69 BPM | SYSTOLIC BLOOD PRESSURE: 104 MMHG | DIASTOLIC BLOOD PRESSURE: 65 MMHG | TEMPERATURE: 97.8 F

## 2020-02-13 DIAGNOSIS — Z34.02 ENCOUNTER FOR SUPERVISION OF NORMAL FIRST PREGNANCY IN SECOND TRIMESTER: Primary | ICD-10-CM

## 2020-02-13 PROCEDURE — 59425 ANTEPARTUM CARE ONLY: CPT | Performed by: NURSE PRACTITIONER

## 2020-02-13 PROCEDURE — 99207 ZZC PRENATAL VISIT: CPT | Performed by: NURSE PRACTITIONER

## 2020-02-13 ASSESSMENT — MIFFLIN-ST. JEOR: SCORE: 1737.04

## 2020-02-13 ASSESSMENT — PAIN SCALES - GENERAL: PAINLEVEL: NO PAIN (0)

## 2020-02-13 NOTE — PROGRESS NOTES
Patient presents for routine prenatal visit. Prenatal flowsheet reviewed and updated as needed.  Denies vaginal bleeding, loss of fluid, contractions or cramping.  Was seen for constipation, but this is improving. Had been having some back pain and cramping, but this is also improving. We discussed some additional relief measures to try and also symptoms for which to monitor.  Screening ultrasound ordered.   Had been planning Quad screen, but has changed her mind and declines this now.  Advice as per Anticipatory Guidance/Checklist updated.  PE: See OB vitals    Questions asked and answered. Next OB visit in 4 week(s) with Dr. Leonardo.    Kinsey CASTREJON CNP

## 2020-03-06 ENCOUNTER — ANCILLARY PROCEDURE (OUTPATIENT)
Dept: ULTRASOUND IMAGING | Facility: CLINIC | Age: 20
End: 2020-03-06
Attending: NURSE PRACTITIONER
Payer: COMMERCIAL

## 2020-03-06 DIAGNOSIS — Z34.02 ENCOUNTER FOR SUPERVISION OF NORMAL FIRST PREGNANCY IN SECOND TRIMESTER: ICD-10-CM

## 2020-03-06 PROCEDURE — 76805 OB US >/= 14 WKS SNGL FETUS: CPT

## 2020-03-11 ENCOUNTER — PRENATAL OFFICE VISIT (OUTPATIENT)
Dept: OBGYN | Facility: CLINIC | Age: 20
End: 2020-03-11
Payer: COMMERCIAL

## 2020-03-11 VITALS
DIASTOLIC BLOOD PRESSURE: 78 MMHG | OXYGEN SATURATION: 97 % | TEMPERATURE: 98.3 F | HEIGHT: 63 IN | BODY MASS INDEX: 40.36 KG/M2 | SYSTOLIC BLOOD PRESSURE: 117 MMHG | WEIGHT: 227.8 LBS | HEART RATE: 76 BPM

## 2020-03-11 DIAGNOSIS — Z34.02 ENCOUNTER FOR SUPERVISION OF NORMAL FIRST PREGNANCY IN SECOND TRIMESTER: Primary | ICD-10-CM

## 2020-03-11 PROCEDURE — 99207 ZZC PRENATAL VISIT: CPT | Performed by: NURSE PRACTITIONER

## 2020-03-11 ASSESSMENT — MIFFLIN-ST. JEOR: SCORE: 1767.42

## 2020-03-11 ASSESSMENT — PAIN SCALES - GENERAL: PAINLEVEL: NO PAIN (0)

## 2020-03-11 NOTE — PROGRESS NOTES
Patient presents for routine prenatal visit. Prenatal flowsheet reviewed and updated as needed.  Denies vaginal bleeding, loss of fluid, contractions or cramping.  Patient without complaint. Labs on return to clinic.   Discussed ultrasound.  Advice as per Anticipatory Guidance/Checklist updated.  PE: See OB vitals    Questions asked and answered. Next OB visit in 4 week(s) with Dr. Leonardo.    Kinesy CASTREJON CNP

## 2020-04-08 ENCOUNTER — VIRTUAL VISIT (OUTPATIENT)
Dept: OBGYN | Facility: CLINIC | Age: 20
End: 2020-04-08

## 2020-04-08 DIAGNOSIS — Z34.02 ENCOUNTER FOR SUPERVISION OF NORMAL FIRST PREGNANCY IN SECOND TRIMESTER: Primary | ICD-10-CM

## 2020-04-08 PROBLEM — N91.1 AMENORRHEA, SECONDARY: Status: ACTIVE | Noted: 2017-01-12

## 2020-04-08 PROBLEM — Z34.00 PREGNANCY, SUPERVISION, NORMAL, FIRST: Status: ACTIVE | Noted: 2019-12-26

## 2020-04-08 PROCEDURE — 99207 ZZC PRENATAL VISIT: CPT | Mod: TEL | Performed by: OBSTETRICS & GYNECOLOGY

## 2020-04-08 NOTE — PROGRESS NOTES
"Honey Ramirez is a 20 year old female who is being evaluated via a billable telephone visit.      The patient has been notified of following:     \"This telephone visit will be conducted via a call between you and your physician/provider. We have found that certain health care needs can be provided without the need for a physical exam.  This service lets us provide the care you need with a short phone conversation.  If a prescription is necessary we can send it directly to your pharmacy.  If lab work is needed we can place an order for that and you can then stop by our lab to have the test done at a later time.    Telephone visits are billed at different rates depending on your insurance coverage. During this emergency period, for some insurers they may be billed the same as an in-person visit.  Please reach out to your insurance provider with any questions.    If during the course of the call the physician/provider feels a telephone visit is not appropriate, you will not be charged for this service.\"    Patient has given verbal consent for Telephone visit?  Yes    Honey Ramirez complains of    Chief Complaint   Patient presents with     Prenatal Care     25w1d       I have reviewed and updated the patient's Past Medical History, Social History, Family History and Medication List.    ALLERGIES  Patient has no known allergies.    Additional provider notes:   No significant signs, symptoms or concerns. Laid off from work.    Total encounter time (physician together with patient) = 15min. Direct counseling, education and care coordination time (physician together with patient) = 10min. This counseling included the following: Advice appropriate to gestational age reviewed including pertinent Checklist items. Discussed condition, tests and care plan including risks, benefits, and alternatives. Problem list updated. 1h GTT next.  A/P:  Honey was seen today for prenatal care.    Diagnoses and all orders for " this visit:    Encounter for supervision of normal first pregnancy in second trimester          Phone call duration: 15 minutes    Asael Moraes MD

## 2020-05-04 ENCOUNTER — NURSE TRIAGE (OUTPATIENT)
Dept: NURSING | Facility: CLINIC | Age: 20
End: 2020-05-04
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
## 2020-05-05 DIAGNOSIS — Z34.02 ENCOUNTER FOR SUPERVISION OF NORMAL FIRST PREGNANCY IN SECOND TRIMESTER: ICD-10-CM

## 2020-05-05 LAB
GLUCOSE 1H P 50 G GLC PO SERPL-MCNC: 172 MG/DL (ref 60–129)
HGB BLD-MCNC: 12.3 G/DL (ref 11.7–15.7)

## 2020-05-05 PROCEDURE — 86780 TREPONEMA PALLIDUM: CPT | Performed by: NURSE PRACTITIONER

## 2020-05-05 PROCEDURE — 36415 COLL VENOUS BLD VENIPUNCTURE: CPT | Performed by: NURSE PRACTITIONER

## 2020-05-05 PROCEDURE — 85018 HEMOGLOBIN: CPT | Performed by: NURSE PRACTITIONER

## 2020-05-05 PROCEDURE — 82950 GLUCOSE TEST: CPT | Performed by: NURSE PRACTITIONER

## 2020-05-06 DIAGNOSIS — R73.09 ABNORMAL GLUCOSE TOLERANCE TEST: Primary | ICD-10-CM

## 2020-05-06 LAB — T PALLIDUM AB SER QL: NONREACTIVE

## 2020-05-19 ENCOUNTER — PRENATAL OFFICE VISIT (OUTPATIENT)
Dept: OBGYN | Facility: CLINIC | Age: 20
End: 2020-05-19
Payer: COMMERCIAL

## 2020-05-19 VITALS
DIASTOLIC BLOOD PRESSURE: 82 MMHG | WEIGHT: 232.8 LBS | BODY MASS INDEX: 41.24 KG/M2 | SYSTOLIC BLOOD PRESSURE: 130 MMHG | HEART RATE: 105 BPM

## 2020-05-19 DIAGNOSIS — E66.813 CLASS 3 SEVERE OBESITY DUE TO EXCESS CALORIES WITH BODY MASS INDEX (BMI) OF 40.0 TO 44.9 IN ADULT, UNSPECIFIED WHETHER SERIOUS COMORBIDITY PRESENT (H): ICD-10-CM

## 2020-05-19 DIAGNOSIS — E66.01 CLASS 3 SEVERE OBESITY DUE TO EXCESS CALORIES WITH BODY MASS INDEX (BMI) OF 40.0 TO 44.9 IN ADULT, UNSPECIFIED WHETHER SERIOUS COMORBIDITY PRESENT (H): ICD-10-CM

## 2020-05-19 DIAGNOSIS — R73.09 ABNORMAL GLUCOSE TOLERANCE TEST: ICD-10-CM

## 2020-05-19 DIAGNOSIS — Z23 NEED FOR TDAP VACCINATION: Primary | ICD-10-CM

## 2020-05-19 DIAGNOSIS — Z34.02 ENCOUNTER FOR SUPERVISION OF NORMAL FIRST PREGNANCY IN SECOND TRIMESTER: ICD-10-CM

## 2020-05-19 PROCEDURE — 99207 ZZC PRENATAL VISIT: CPT | Performed by: OBSTETRICS & GYNECOLOGY

## 2020-05-19 PROCEDURE — 90471 IMMUNIZATION ADMIN: CPT | Performed by: OBSTETRICS & GYNECOLOGY

## 2020-05-19 PROCEDURE — 90715 TDAP VACCINE 7 YRS/> IM: CPT | Performed by: OBSTETRICS & GYNECOLOGY

## 2020-05-19 NOTE — PROGRESS NOTES
31w0d.  Tired.  No HA, visual changes, N/V etc 3hr GTT tomorrow.. RTC 2 wk/prn.    ICD-10-CM    1. Need for Tdap vaccination  Z23 TDAP VACCINE     VACCINE ADMINISTRATION, INITIAL   2. Encounter for supervision of normal first pregnancy in second trimester  Z34.02    3. Abnormal glucose tolerance test  R73.09    4. Class 3 severe obesity due to excess calories with body mass index (BMI) of 40.0 to 44.9 in adult, unspecified whether serious comorbidity present (H)  E66.01     Z68.41      CEPHAS AGBEH, MD.

## 2020-05-19 NOTE — PATIENT INSTRUCTIONS
If you have any questions regarding your visit, Please contact your care team.  [facility]: [phone]  [facility] CLINIC HOURS TELEPHONE NUMBER   Cephas Agbeh, M.D.      Madhu Flynn-  Alicia-         Monday-[place]    8:00a.m-4:45 p.m    Tuesday--[place]     8:00a.m-4:45 p.m.    Thursday-[place]    8:00a.m-4:45 p.m.    Friday-[place]    8:00a.m-4:45 p.m    [facility]   [address] [place] [address]   [phone]-Ask for [facility]   Fax [phone]   Ucczyih-[phone]     [facility] Delivery   [address] [place] [address] [place] [address]   [phone]    [facility]  [address] [facility] [address]  [phone]  Pghahkr-[phone]   Urgent Care locations:    [facility] Monday-Friday  5 pm - 9 pm  Saturday and Sunday   9 am - 5 pm   Monday-Friday   5 pm - 9 pm  Saturday and Sunday  9 am - 5 pm    [phone]   If you need a medication refill, please contact your pharmacy. Please allow 3 business days for your refill to be completed.  As always, Thank you for trusting us with your healthcare needs!

## 2020-05-19 NOTE — PROGRESS NOTES
Prior to immunization administration, verified patients identity using patient s name and date of birth. Please see Immunization Activity for additional information.     Screening Questionnaire for Adult Immunization    Are you sick today?   No   Do you have allergies to medications, food, a vaccine component or latex?   No   Have you ever had a serious reaction after receiving a vaccination?   No   Do you have a long-term health problem with heart, lung, kidney, or metabolic disease (e.g., diabetes), asthma, a blood disorder, no spleen, complement component deficiency, a cochlear implant, or a spinal fluid leak?  Are you on long-term aspirin therapy?   Yes   Do you have cancer, leukemia, HIV/AIDS, or any other immune system problem?   No   Do you have a parent, brother, or sister with an immune system problem?   No   In the past 3 months, have you taken medications that affect  your immune system, such as prednisone, other steroids, or anticancer drugs; drugs for the treatment of rheumatoid arthritis, Crohn s disease, or psoriasis; or have you had radiation treatments?   No   Have you had a seizure, or a brain or other nervous system problem?   No   During the past year, have you received a transfusion of blood or blood    products, or been given immune (gamma) globulin or antiviral drug?   No   For women: Are you pregnant or is there a chance you could become       pregnant during the next month?   Yes   Have you received any vaccinations in the past 4 weeks?   No     Immunization questionnaire was positive for at least one answer.  Notified Agbeh.        Per orders of Dr. Agbeh, injection of Tdap given by Karen Jarquin MA. Patient instructed to remain in clinic for 15 minutes afterwards, and to report any adverse reaction to me immediately.       Screening performed by Karen Jarquin MA on 5/19/2020 at 12:05 PM.

## 2020-05-20 DIAGNOSIS — R73.09 ABNORMAL GLUCOSE TOLERANCE TEST: ICD-10-CM

## 2020-05-20 LAB
GLUCOSE 1H P 100 G GLC PO SERPL-MCNC: 199 MG/DL (ref 60–179)
GLUCOSE 2H P 100 G GLC PO SERPL-MCNC: 121 MG/DL (ref 60–154)
GLUCOSE 3H P 100 G GLC PO SERPL-MCNC: 152 MG/DL (ref 60–139)
GLUCOSE P FAST SERPL-MCNC: 95 MG/DL (ref 60–94)

## 2020-05-20 PROCEDURE — 82952 GTT-ADDED SAMPLES: CPT | Performed by: OBSTETRICS & GYNECOLOGY

## 2020-05-20 PROCEDURE — 36415 COLL VENOUS BLD VENIPUNCTURE: CPT | Performed by: OBSTETRICS & GYNECOLOGY

## 2020-05-20 PROCEDURE — 82951 GLUCOSE TOLERANCE TEST (GTT): CPT | Performed by: OBSTETRICS & GYNECOLOGY

## 2020-05-21 ENCOUNTER — NURSE TRIAGE (OUTPATIENT)
Dept: NURSING | Facility: CLINIC | Age: 20
End: 2020-05-21

## 2020-05-22 DIAGNOSIS — O24.419 GESTATIONAL DIABETES MELLITUS (GDM) IN THIRD TRIMESTER, GESTATIONAL DIABETES METHOD OF CONTROL UNSPECIFIED: Primary | ICD-10-CM

## 2020-05-22 NOTE — TELEPHONE ENCOUNTER
She was doing a face mask and when she took it off her face was really red and irritated. She wants to know if she can take a benadryl. Per our one note yes she can take Benadryl, so I passed that information on to her.    Angela Sweet RN/ [facility]      Additional Information    Caller has medication question only, adult not sick, and triager answers question    Protocols used: MEDICATION QUESTION CALL-A-AH

## 2020-06-02 ENCOUNTER — PRENATAL OFFICE VISIT (OUTPATIENT)
Dept: OBGYN | Facility: CLINIC | Age: 20
End: 2020-06-02
Payer: COMMERCIAL

## 2020-06-02 VITALS
DIASTOLIC BLOOD PRESSURE: 82 MMHG | SYSTOLIC BLOOD PRESSURE: 110 MMHG | WEIGHT: 235.8 LBS | OXYGEN SATURATION: 96 % | HEART RATE: 77 BPM | HEIGHT: 63 IN | BODY MASS INDEX: 41.78 KG/M2 | TEMPERATURE: 97.6 F

## 2020-06-02 DIAGNOSIS — O24.419 GESTATIONAL DIABETES MELLITUS (GDM) IN THIRD TRIMESTER, GESTATIONAL DIABETES METHOD OF CONTROL UNSPECIFIED: Primary | ICD-10-CM

## 2020-06-02 PROCEDURE — 99207 ZZC PRENATAL VISIT: CPT | Performed by: NURSE PRACTITIONER

## 2020-06-02 ASSESSMENT — MIFFLIN-ST. JEOR: SCORE: 1803.71

## 2020-06-02 ASSESSMENT — PAIN SCALES - GENERAL: PAINLEVEL: NO PAIN (0)

## 2020-06-02 NOTE — PROGRESS NOTES
Patient presents for routine prenatal visit. Prenatal flowsheet reviewed and updated as needed.  Denies vaginal bleeding, loss of fluid, contractions or cramping. Denies headache, nausea/vomiting, upper abdominal pain, vision changes, lower extremity swelling, chest pain or shortness of breath.  Patient without complaint. Has virtual meeting with Diabetes Education tomorrow.  Due to elevated BMI and GDM A1-plan growth ultrasound in 2-3 weeks.  Questions answered regarding the GDM diagnosis.   GBS after 35 weeks  Advice as per Anticipatory Guidance/Checklist updated.  PE: See OB vitals    Questions asked and answered. Next OB visit in 2 week(s) with Dr. Leonardo.    Kinsey CASTREJON CNP

## 2020-06-03 ENCOUNTER — ALLIED HEALTH/NURSE VISIT (OUTPATIENT)
Dept: EDUCATION SERVICES | Facility: CLINIC | Age: 20
End: 2020-06-03
Payer: COMMERCIAL

## 2020-06-03 DIAGNOSIS — O24.419 GESTATIONAL DIABETES MELLITUS (GDM) IN THIRD TRIMESTER, GESTATIONAL DIABETES METHOD OF CONTROL UNSPECIFIED: Primary | ICD-10-CM

## 2020-06-03 RX ORDER — LANCETS 33 GAUGE
1 EACH MISCELLANEOUS 4 TIMES DAILY
Qty: 200 EACH | Refills: 3 | Status: SHIPPED | OUTPATIENT
Start: 2020-06-03 | End: 2020-07-31

## 2020-06-03 RX ORDER — BLOOD-GLUCOSE METER
EACH MISCELLANEOUS
Qty: 1 KIT | Refills: 1 | Status: SHIPPED | OUTPATIENT
Start: 2020-06-03 | End: 2020-07-31

## 2020-06-03 RX ORDER — BLOOD SUGAR DIAGNOSTIC
STRIP MISCELLANEOUS
Qty: 150 STRIP | Refills: 3 | Status: SHIPPED | OUTPATIENT
Start: 2020-06-03 | End: 2020-07-31

## 2020-06-03 NOTE — PROGRESS NOTES
Diabetes Self-Management Education & Support    Patient verbally consented to the telephone visit service today: yes    SUBJECTIVE/OBJECTIVE:  Presents for education related to gestational diabetes.    Patient was unable to talk, because she needed to get to work. She had completed questionnaire prior to visit.    Diabetes management related comments/concerns: No  Hospital planned for delivery: [facility]  Number of previous preganancies: 0  Had any babies over 9 lbs: No  Previously had Gestational Diabetes: No  Have you ever had thyroid problems or taken thyroid medication?: No  Heart disease, mitral valve prolapse or rheumatic fever?: No  Hypertension : No  High Cholesterol: No  High Triglycerides: No  Do you use tobacco products?: No  Do you drink beer, wine or hard liquor?: No    Cultural Influences/Ethnic Background:  American      Estimated Date of Delivery: 2020    1 hour OGTT  Lab Results   Component Value Date    GLU1 172 (H) 2020       3 hour OGTT    Fasting  Lab Results   Component Value Date    GLF 95 (H) 2020       1 hour  Lab Results   Component Value Date    GL1 199 (H) 2020       2 hour  Lab Results   Component Value Date    GL2 121 2020       3 hour  Lab Results   Component Value Date    GL3 152 (H) 2020       Lifestyle and Health Behaviors:  Pre-pregnancy weight (lbs): 230  Barrier to exercise: Safety, Physical limitation  Cultural/Zoroastrianism diet restrictions?: Yes  Meal planning/habits: Avoiding sweets, Calorie counting, Keeps food records  How many times a week on average do you eat food made away from home (restaurant/take-out)?: 2  Meals include: Lunch, Dinner, Afternoon Snack, Evening Snack  Beverages: Water, Milk, Soda  How many servings of fruits/vegetables per day: 5  Biggest challenges to healthy eating: Evening snacking, Other  Pre- vitamin?: Yes  Supplements?: No  Experiencing nausea?: Yes  Experiencing heartburn?: Yes    Healthy  Coping:  Informal Support system:: [facility], Family, Friends, Parent, Significant other    Current Management:       ASSESSMENT:  Patient was unable to attend education due to needing to get to work.  Reports soda intake, safety concerns for exercise.      INTERVENTION:  Confirmed patients pharmacy, stated times to check blood sugar, to start with just checking blood sugar, if readings are elevated then review food recommendations to make adjustments.  Patient to contact team if questions, confirmed follow up appointment time.    Educational topics covered today:  When to check blood sugars and that appointment information would be emailed to respect her time.      Educational materials provided today:   GDM HS snack list, food log book, blood sugar log book, understanding gestational diabetes, What if insulin is needed, what to do after delivery, Nutrition recommendations, and insulin requirements visual.     Pt verbalized understanding of concepts discussed and recommendations provided today.     PLAN:    Check glucose 4 times daily, before breakfast and 1 hour after each meal.     If able check Ketones daily for one week, if negative, reduce testing to once a week.    Physical activity recommended: if she feels safe.    Meal plan: 30g carbs at breakfast, 45-60g carbs at lunch, 45-60g carbs at supper, 45-60g carbs at 3 snacks a day.  Follow consistent CHO meal plan, eat CHO and protein/fat at all meals/snacks.    Call/e-mail/V2contactt message diabetes educator if 3 or more blood sugars are above the goal in 1 week, if ketones are positive, or with questions/concerns.    Patient to review information via E-mail as time did not allow for education    Alma Lind MS, RD, LD, CDE    Time Spent: 3 minutes  Encounter Type: Individual- phone call    Any diabetes medication dose changes were made via the CDE Protocol and Collaborative Practice Agreement with the patient's OB/GYN provider. A copy of this encounter was  shared with the provider.

## 2020-06-10 ENCOUNTER — ALLIED HEALTH/NURSE VISIT (OUTPATIENT)
Dept: EDUCATION SERVICES | Facility: CLINIC | Age: 20
End: 2020-06-10
Payer: COMMERCIAL

## 2020-06-10 DIAGNOSIS — O24.419 GESTATIONAL DIABETES MELLITUS (GDM) IN THIRD TRIMESTER, GESTATIONAL DIABETES METHOD OF CONTROL UNSPECIFIED: Primary | ICD-10-CM

## 2020-06-10 PROCEDURE — 98968 PH1 ASSMT&MGMT NQHP 21-30: CPT

## 2020-06-10 NOTE — PROGRESS NOTES
Diabetes Self-Management Education & Support    SUBJECTIVE/OBJECTIVE:  Telephone Visit for education related to gestational diabetes.    Patient verbally consented to the telephone visit service today: yes    Accompanied by: Self  Diabetes management related comments/concerns: Honey is wondering about ketones testing, has not been following  Gestational weeks: 34  Hospital planned for delivery: [facility]  Number of previous preganancies: 0  Had any babies over 9 lbs: No  Previously had Gestational Diabetes: No  Have you ever had thyroid problems or taken thyroid medication?: No  Heart disease, mitral valve prolapse or rheumatic fever?: No  Hypertension : No  High Cholesterol: No  High Triglycerides: No  Do you use tobacco products?: No  Do you drink beer, wine or hard liquor?: No    Cultural Influences/Ethnic Background:  American    LMP 10/01/2019 (Approximate)       Estimated Date of Delivery: Jul 21, 2020    Blood Glucose/Ketone Log:    Date Ketones Fasting Post Breakfast Post Lunch Post Supper   6/10  87      6/9  73 152 145 134   6/8  83 140     6/7  98 76 124 137   6/6  84 101 71    6/5  108 99 181 111   6/4  82 159 136 117   6/3    144 170       Lifestyle and Health Behaviors:  Pre-pregnancy weight (lbs): 230  Exercise:: Currently not exercising(Walking when she has the energy)  Barrier to exercise: Safety, Physical limitation  Cultural/Sikh diet restrictions?: Yes  Meal planning/habits: Avoiding sweets, Calorie counting, Keeps food records  How many times a week on average do you eat food made away from home (restaurant/take-out)?: 2  Meals include: Lunch, Dinner, Afternoon Snack, Evening Snack  Breakfast: fish with fries (brunch) with 1 can of Mt. Dew OR eggs with bread and avocado with small piece of toast  Lunch: (brunch) see above OR chicken with rice  Dinner: Chicken strips with ketchup OR rice with veggies and some protein  Snacks: Chips  Beverages: Water, Milk, Soda(2 bottles of water and mt  dew (1 can))  How many servings of fruits/vegetables per day: 5  Biggest challenges to healthy eating: Evening snacking, Other  Pre-leif vitamin?: Yes  Supplements?: No  Experiencing nausea?: Yes  Experiencing heartburn?: Yes    Healthy Coping:  Informal Support system:: [facility], Family, Friends, Parent, Significant other    Current Management:  Taking medications for gestational diabetes?: No  Difficulty affording diabetes medication?: No  Difficulty affording diabetes testing supplies?: No    ASSESSMENT:  Ketones: not testing yet.   Fasting blood glucoses: 71% in target.  After breakfast: 66% in target.  After lunch: 50% in target.  After dinner: 80% in target.    Honey had her initial GDM appointment last week, but had a scheduling conflict and wasn't able to attend the appt. She was sent education material and has reviewed this material. She states that she hasn't started carb counting, but has cut back on her portion sizes.  She is using her BG results to determine if the meal she ate was too many carbohydrates. Today we discussed carb counting reviewed meal plan, Honey agrees to start following meal plan. We also discuss importance of balanced meals and consistent carbohydrates throughout the day. We discuss ketone testing and patient agrees to start testing.  Patient does not have log book yet, this will be sent in the mail today.  For now she will write down BG and ketone results in a notebook and send them in 1 week.      INTERVENTION:  Educational topics covered today:  Carbohydrate Counting/meal plan  GDM pathophysiology  Ketone testing  Potential complications of GDM   BG testing and goals    PLAN:  Check glucose 4 times daily.  Check ketones once a week when readings are consistently negative.  Continue with recommended physical activity.  Continue to follow recommended meal plan: 2-3 carbs at breakfast, 3-4 carbs at lunch, 3-4 carbs at supper, 1-2 carbs at snacks.  Follow consistent CHO meal  plan, eat CHO and protein/fat at all meals/snacks.    Call/e-mail/MyChart message diabetes educator if 3 or more blood sugars are above the goal in 1 week or if ketones are positive.    Mesha Alvarez RD, LD, CDE  Time Spent: 30 minutes  Encounter Type: Individual    Any diabetes medication dose changes were made via the CDE Protocol and Collaborative Practice Agreement with the patient's referring provider. A copy of this encounter was shared with the provider.

## 2020-06-15 ENCOUNTER — NURSE TRIAGE (OUTPATIENT)
Dept: NURSING | Facility: CLINIC | Age: 20
End: 2020-06-15

## 2020-06-15 NOTE — TELEPHONE ENCOUNTER
Patient started saying concern was for the past few days after she urinates, she continues to leak urine and then call disconnected.

## 2020-06-15 NOTE — TELEPHONE ENCOUNTER
"Contacted patient via text message from personal cell phone, due to cellular network being down and unable to call patient, mom, or dad via phone call.  [place]  Due to the urgent nature of the situation and the patient needing to proceed to L&D right away, texted patient.  She responded \"Oh, I was wondering why I was not getting a call\".  Relayed information from L&D in Carmella's note below.  Patient had no further questions.      Zandra Pak RN on 6/15/2020 at 4:34 PM    "

## 2020-06-15 NOTE — TELEPHONE ENCOUNTER
"Pt is 35 weeks pregnant and for the last 2 days has noticed a small amount of clear fluid leading from her vagina after she urinates and sometimes when she sits down. She has had some lower left back pain that was quite intense at work. But is better now. She says she sees Kinsey Zavala CNP for her provider and is planning on delivering at [facility]. We discussed it could be her membranes have ruptured with smaller amounts of fluid leaking in certain positions, and the OB's will want her seen in L&D this afternoon.  I will call [facility] to find out if any changes on the coaches having to stay in the hospital VS coming and going with the covid-19 pandemic. And then call her back.     L&D said yes she should come to L&D and she and her  should come prepared to stay at the hospital. I have tried calling the patient several times and the call does not go through at this time. Twice there has been a busy signal. Both her mom and dad's phones are acting the same way. I can call other number without trouble. Turns out there are some phone companies down and that is why it's not working on my cell phone either. My coworker Zandra Pak RN will call her from her cell phone to see if it goes through.     GERTRUDIS sent via smart web to Dr Moraes, the [facility] on call, that the patient is on her way to [facility] L&D.    Carmella Quintero RN  [facility]  Triage Nurse Advisors      Reason for Disposition    Leakage of fluid from vagina    Additional Information    Negative: [1] SEVERE abdominal pain (e.g., excruciating) AND [2] constant AND [3] present > 1 hour    Negative: Severe bleeding (e.g., continuous red blood from vagina, or large blood clots)    Negative: Umbilical cord hanging out of the vagina (shiny, white, curled appearance, \"like telephone cord\")    Negative: Uncontrollable urge to push (i.e., feels like baby is coming out now)    Negative: Can see baby    Negative: Sounds like a " life-threatening emergency to the triager    Negative: < 20 weeks pregnant    Negative: Vaginal bleeding    Protocols used: PREGNANCY - RUPTURE OF [id number]

## 2020-06-16 ENCOUNTER — ANCILLARY PROCEDURE (OUTPATIENT)
Dept: ULTRASOUND IMAGING | Facility: CLINIC | Age: 20
End: 2020-06-16
Attending: NURSE PRACTITIONER
Payer: COMMERCIAL

## 2020-06-16 ENCOUNTER — PRENATAL OFFICE VISIT (OUTPATIENT)
Dept: OBGYN | Facility: CLINIC | Age: 20
End: 2020-06-16
Payer: COMMERCIAL

## 2020-06-16 VITALS
BODY MASS INDEX: 43.02 KG/M2 | SYSTOLIC BLOOD PRESSURE: 122 MMHG | WEIGHT: 242.8 LBS | DIASTOLIC BLOOD PRESSURE: 82 MMHG | HEIGHT: 63 IN | OXYGEN SATURATION: 97 % | HEART RATE: 83 BPM | TEMPERATURE: 97.8 F

## 2020-06-16 DIAGNOSIS — O24.419 GESTATIONAL DIABETES MELLITUS (GDM) IN THIRD TRIMESTER, GESTATIONAL DIABETES METHOD OF CONTROL UNSPECIFIED: Primary | ICD-10-CM

## 2020-06-16 DIAGNOSIS — O24.419 GESTATIONAL DIABETES MELLITUS (GDM) IN THIRD TRIMESTER, GESTATIONAL DIABETES METHOD OF CONTROL UNSPECIFIED: ICD-10-CM

## 2020-06-16 PROCEDURE — 76816 OB US FOLLOW-UP PER FETUS: CPT

## 2020-06-16 PROCEDURE — 99207 ZZC PRENATAL VISIT: CPT | Performed by: NURSE PRACTITIONER

## 2020-06-16 ASSESSMENT — PAIN SCALES - GENERAL: PAINLEVEL: NO PAIN (0)

## 2020-06-16 ASSESSMENT — MIFFLIN-ST. JEOR: SCORE: 1835.46

## 2020-06-16 NOTE — PROGRESS NOTES
Patient presents for routine prenatal visit. Prenatal flowsheet reviewed and updated as needed.  Denies vaginal bleeding, contractions or cramping. Denies headache, nausea/vomiting, upper abdominal pain, vision changes, lower extremity swelling, chest pain or shortness of breath.    Patient without complaint. Was seen in L&D yesterday to rule out ROM-Amnisure negative.  Per patient, few BS elevated-does not have book with her today. Will reach out to Diabetic RN for follow up.  Growth ultrasound next today.   GBS on return to clinic.  Advice as per Anticipatory Guidance/Checklist updated.  PE: See OB vitals    Questions asked and answered. Next OB visit in 1 week(s) with Dr. Leonardo.    Kinsey CASTREJON CNP

## 2020-07-01 ENCOUNTER — PRENATAL OFFICE VISIT (OUTPATIENT)
Dept: OBGYN | Facility: CLINIC | Age: 20
End: 2020-07-01
Payer: COMMERCIAL

## 2020-07-01 VITALS
BODY MASS INDEX: 43.62 KG/M2 | WEIGHT: 246.2 LBS | SYSTOLIC BLOOD PRESSURE: 122 MMHG | HEIGHT: 63 IN | DIASTOLIC BLOOD PRESSURE: 84 MMHG | TEMPERATURE: 97.8 F | HEART RATE: 79 BPM | OXYGEN SATURATION: 97 %

## 2020-07-01 DIAGNOSIS — O24.419 GESTATIONAL DIABETES MELLITUS (GDM) IN THIRD TRIMESTER, GESTATIONAL DIABETES METHOD OF CONTROL UNSPECIFIED: Primary | ICD-10-CM

## 2020-07-01 PROCEDURE — 99207 ZZC PRENATAL VISIT: CPT | Performed by: NURSE PRACTITIONER

## 2020-07-01 PROCEDURE — 87653 STREP B DNA AMP PROBE: CPT | Performed by: NURSE PRACTITIONER

## 2020-07-01 PROCEDURE — 59025 FETAL NON-STRESS TEST: CPT | Performed by: NURSE PRACTITIONER

## 2020-07-01 ASSESSMENT — PAIN SCALES - GENERAL: PAINLEVEL: NO PAIN (0)

## 2020-07-01 ASSESSMENT — MIFFLIN-ST. JEOR: SCORE: 1850.89

## 2020-07-01 NOTE — PROGRESS NOTES
Patient presents for routine prenatal visit. Prenatal flowsheet reviewed and updated as needed.  Denies vaginal bleeding, loss of fluid, contractions or cramping. Denies headache, nausea/vomiting, upper abdominal pain, vision changes, lower extremity swelling, chest pain or shortness of breath.  Patient without complaint. Reviewed signs and symptoms of labor and when to proceed to L&D.    Patient states she has not been checking blood sugars since last visit - keeps forgetting. Will do NST today as they have not been evaluated recently. Trying to follow diet recommendations.  Stressed importance of monitoring blood sugars regularly, reviewed risks of uncontrolled GDM at term. Patient will start to monitor closely and follow up with Diab Ed in a few days with results as she has not sent them values in a few weeks.  GBS done.  Advice as per Anticipatory Guidance/Checklist updated.  PE: See OB vitals    GDM-poor monitoring  NST IS:  Reactive (2 accl > 15 BPM in 20 min., each lasting approx. 15 seconds)  NST Baseline Rate 140  Variability:  Average  Accelerations:Present  Variable Decelerations:No  Other Decelerations:No  Contractions: None    Questions asked and answered. Next OB visit in 1 week(s) with MD. Kinsey CASTREJON CNP

## 2020-07-02 LAB
GP B STREP DNA SPEC QL NAA+PROBE: NEGATIVE
SPECIMEN SOURCE: NORMAL

## 2020-07-06 ENCOUNTER — PRENATAL OFFICE VISIT (OUTPATIENT)
Dept: OBGYN | Facility: CLINIC | Age: 20
End: 2020-07-06
Payer: COMMERCIAL

## 2020-07-06 VITALS
BODY MASS INDEX: 43.75 KG/M2 | DIASTOLIC BLOOD PRESSURE: 82 MMHG | SYSTOLIC BLOOD PRESSURE: 124 MMHG | TEMPERATURE: 98.8 F | WEIGHT: 247 LBS

## 2020-07-06 DIAGNOSIS — O24.419 GESTATIONAL DIABETES MELLITUS (GDM) IN THIRD TRIMESTER, GESTATIONAL DIABETES METHOD OF CONTROL UNSPECIFIED: Primary | ICD-10-CM

## 2020-07-06 DIAGNOSIS — Z34.02 ENCOUNTER FOR SUPERVISION OF NORMAL FIRST PREGNANCY IN SECOND TRIMESTER: ICD-10-CM

## 2020-07-06 PROCEDURE — 99207 ZZC PRENATAL VISIT: CPT | Performed by: OBSTETRICS & GYNECOLOGY

## 2020-07-06 NOTE — PATIENT INSTRUCTIONS
If you have any questions regarding your visit, Please contact your care team.    [employer] [facility]: [phone]      Women s Health CLINIC HOURS TELEPHONE NUMBER   MD Letha Stafford CMA Susie -    LAMONT Nagel       Monday:       7:30-4:15 [place]  Wednesday:      Administrative  Thursday:       7:30-4:15 [place]  Friday:       Administrative     [facility]  [address] [place]  [address]  [phone] ask for Women's [facility]  [address] [place] [address]  [phone] ask for [facility]    Imaging Scheduling for [place]:  [phone]    Imaging Scheduling for [place]: [phone]       Urgent Care locations:    [facility] Saturday and Sunday   9 am - 5 pm    Monday-Friday   12 pm - 8 pm  Saturday and Sunday   9 am - 5 pm   [phone]     [facility] Labor and Delivery:  [phone]    If you need a medication refill, please contact your pharmacy. Please allow 3 business days for your refill to be completed.  As always, Thank you for trusting us with your healthcare needs!

## 2020-07-06 NOTE — PROGRESS NOTES
Patient presents for routine prenatal visit at 37w6d.  Patient without complaint.   Not testing sugars.  Discussed the importance of testing sugars.  PE: See OB vitals  Body mass index is 43.75 kg/m .  Doing well.  No concerns today.  No vaginal bleeding, LOF, contractions.  No HA, RUQ pain, N/V, visual changes.  Questions asked and answered.      Juan Leonardo MD FACOG

## 2020-07-10 ENCOUNTER — TELEPHONE (OUTPATIENT)
Dept: OBGYN | Facility: CLINIC | Age: 20
End: 2020-07-10

## 2020-07-10 NOTE — TELEPHONE ENCOUNTER
Pt told MAZIN Monae, that she was just going to go into L&D before call was transferred.    Charlotte Hirsch RN

## 2020-07-10 NOTE — TELEPHONE ENCOUNTER
Patient would like to know if she should go to L&D, thinks her water may have broke. Letha Garcia TC/Pt Rep

## 2020-07-14 ENCOUNTER — PRENATAL OFFICE VISIT (OUTPATIENT)
Dept: OBGYN | Facility: CLINIC | Age: 20
End: 2020-07-14
Payer: COMMERCIAL

## 2020-07-14 VITALS
WEIGHT: 255 LBS | SYSTOLIC BLOOD PRESSURE: 126 MMHG | BODY MASS INDEX: 45.18 KG/M2 | TEMPERATURE: 98.3 F | OXYGEN SATURATION: 97 % | HEIGHT: 63 IN | HEART RATE: 75 BPM | DIASTOLIC BLOOD PRESSURE: 83 MMHG

## 2020-07-14 DIAGNOSIS — O24.419 GESTATIONAL DIABETES MELLITUS (GDM) IN THIRD TRIMESTER, GESTATIONAL DIABETES METHOD OF CONTROL UNSPECIFIED: Primary | ICD-10-CM

## 2020-07-14 PROCEDURE — 99207 ZZC PRENATAL VISIT: CPT | Performed by: NURSE PRACTITIONER

## 2020-07-14 ASSESSMENT — MIFFLIN-ST. JEOR: SCORE: 1890.8

## 2020-07-14 ASSESSMENT — PAIN SCALES - GENERAL: PAINLEVEL: NO PAIN (0)

## 2020-07-17 ENCOUNTER — PRENATAL OFFICE VISIT (OUTPATIENT)
Dept: OBGYN | Facility: CLINIC | Age: 20
End: 2020-07-17
Payer: COMMERCIAL

## 2020-07-17 ENCOUNTER — TELEPHONE (OUTPATIENT)
Dept: OBGYN | Facility: CLINIC | Age: 20
End: 2020-07-17

## 2020-07-17 VITALS
DIASTOLIC BLOOD PRESSURE: 85 MMHG | BODY MASS INDEX: 44.85 KG/M2 | WEIGHT: 253.2 LBS | HEART RATE: 70 BPM | SYSTOLIC BLOOD PRESSURE: 129 MMHG

## 2020-07-17 DIAGNOSIS — Z34.02 ENCOUNTER FOR SUPERVISION OF NORMAL FIRST PREGNANCY IN SECOND TRIMESTER: ICD-10-CM

## 2020-07-17 DIAGNOSIS — O24.419 GESTATIONAL DIABETES MELLITUS (GDM) IN THIRD TRIMESTER, GESTATIONAL DIABETES METHOD OF CONTROL UNSPECIFIED: Primary | ICD-10-CM

## 2020-07-17 PROCEDURE — 99207 ZZC PRENATAL VISIT: CPT | Performed by: OBSTETRICS & GYNECOLOGY

## 2020-07-17 PROCEDURE — 59426 ANTEPARTUM CARE ONLY: CPT | Performed by: OBSTETRICS & GYNECOLOGY

## 2020-07-17 NOTE — PROGRESS NOTES
39w3d  No leakage of fluid.  Denies HA, visual changes etc.  Discussed labor plan as well as cervical ripening/induction options.Induction on 7/19/20 at 7 Am  Reviewed when to call.      ICD-10-CM    1. Gestational diabetes mellitus (GDM) in third trimester, gestational diabetes method of control unspecified  O24.419    2. Encounter for supervision of normal first pregnancy in second trimester  Z34.02      CEPHAS AGBEH, MD.

## 2020-07-17 NOTE — PATIENT INSTRUCTIONS
If you have any questions regarding your visit, Please contact your care team.  [employer] [facility]: [phone]  [facility] CLINIC HOURS TELEPHONE NUMBER   Cephas Agbeh, M.D.      Madhu Flynn-  Alicia-         Monday-Kris    8:00a.m-4:45 p.m    Tuesday--[place]     8:00a.m-4:45 p.m.    Thursday-[place]    8:00a.m-4:45 p.m.    Friday-[place]    8:00a.m-4:45 p.m    [facility]   [address] [place] [address]   [phone]-Ask for [facility]   Fax [phone]   [phone]     [facility] Labor and Delivery   [address] [place] [address] [place] [address]   [phone]    [facility]  [address] [address]  [phone]  [phone]-[phone]   Urgent Care locations:    [facility] Monday-Friday  5 pm - 9 pm  Saturday and Sunday   9 am - 5 pm   Monday-Friday   5 pm - 9 pm  Saturday and Sunday  9 am - 5 pm    [phone]   If you need a medication refill, please contact your pharmacy. Please allow 3 business days for your refill to be completed.  As always, Thank you for trusting us with your healthcare needs!

## 2020-07-22 ENCOUNTER — NURSE TRIAGE (OUTPATIENT)
Dept: NURSING | Facility: CLINIC | Age: 20
End: 2020-07-22

## 2020-07-22 NOTE — TELEPHONE ENCOUNTER
"Patient says she was just discharged from the hospital and was offered a prescription of pain medication at discharged but declined.  Patient says pain is now \"bad\" (pain level is 6.5/10), says pain makes it difficult for her to get up.  Patient says took some Tylenol but says not helping with the pain.  Patient is requesting pain medication to be prescrbed tonight.  At 6:57 pm FNA paged Dr. Asael Moraes to call FNA back.  FNA spoke to Dr. Moraes who says he is unable to prescribe now that patient is out of the hospital.  He recommends Ibuprofen/Tylenol and be seen tomorrow to address her pain.  FNA informed patient and transferred her to schedule appointment.        Reason for Disposition    [1] MILD-MODERATE post-op pain (e.g., pain scale 1-7) AND [2] not controlled with pain medications    Additional Information    Negative: Sounds like a life-threatening emergency to the triager    Negative: [1] Widespread rash AND [2] bright red, sunburn-like    Negative: Fever > 100.4 F (38.0 C)    Negative: [1] SEVERE pain AND [2] not relieved by pain medications    Negative: [1] SEVERE headache AND [2] not relieved with pain medications (e.g., acetaminophen/Tylenol)    Negative: [1] Vomiting AND [2] persists > 4 hours    Negative: [1] Vomiting AND [2] abdomen is getting more swollen    Negative: [1] Drinking very little AND [2] dehydration suspected (e.g., no urine > 12 hours, very dry mouth, very lightheaded)    Negative: Patient sounds very sick or weak to the triager    Negative: Foul smelling vaginal discharge (i.e., lochia)    Negative: [1] Something is hanging out of the vagina AND [2] can't easily be pushed back inside    Negative: [1] Caller has URGENT question AND [2] triager unable to answer question    Negative: [1] Headache AND [2] after spinal (epidural) anesthesia AND [3] not severe    Negative: Fever present > 3 days (72 hours)    Protocols used: POSTPARTUM -  [id number]      "

## 2020-07-23 ENCOUNTER — OFFICE VISIT (OUTPATIENT)
Dept: OBGYN | Facility: CLINIC | Age: 20
End: 2020-07-23
Payer: COMMERCIAL

## 2020-07-23 VITALS — SYSTOLIC BLOOD PRESSURE: 126 MMHG | HEART RATE: 74 BPM | TEMPERATURE: 98.9 F | DIASTOLIC BLOOD PRESSURE: 85 MMHG

## 2020-07-23 DIAGNOSIS — G89.18 ACUTE POST-OPERATIVE PAIN: Primary | ICD-10-CM

## 2020-07-23 DIAGNOSIS — Z34.00 SUPERVISION OF NORMAL FIRST PREGNANCY, ANTEPARTUM: ICD-10-CM

## 2020-07-23 PROCEDURE — 99024 POSTOP FOLLOW-UP VISIT: CPT | Performed by: OBSTETRICS & GYNECOLOGY

## 2020-07-23 RX ORDER — OXYCODONE HYDROCHLORIDE 5 MG/1
5 TABLET ORAL EVERY 6 HOURS PRN
Qty: 15 TABLET | Refills: 0 | Status: SHIPPED | OUTPATIENT
Start: 2020-07-23 | End: 2020-07-26

## 2020-07-23 RX ORDER — ACETAMINOPHEN 500 MG
500-1000 TABLET ORAL EVERY 6 HOURS PRN
COMMUNITY
End: 2021-11-24

## 2020-07-23 RX ORDER — IBUPROFEN 800 MG/1
800 TABLET, FILM COATED ORAL EVERY 8 HOURS PRN
COMMUNITY
End: 2020-09-01

## 2020-07-23 NOTE — PATIENT INSTRUCTIONS
If you have any questions regarding your visit, Please contact your care team.    [employer] [facility]: [phone]      Women s Health CLINIC HOURS TELEPHONE NUMBER   MD Letha Stafford CMA Susie -    LAMONT Nagel       Monday:       7:30-4:15 [place]  Wednesday:      Administrative  Thursday:       7:30-4:15 [place]  Friday:       Administrative     [facility]  [address] [place]  [address]  [phone] ask for Women's [facility]  [address] [place] [address]  [phone] ask for [facility]    Imaging Scheduling for [place]:  [phone]    Imaging Scheduling for [place]: [phone]       Urgent Care locations:    [facility] Saturday and Sunday   9 am - 5 pm    Monday-Friday   12 pm - 8 pm  Saturday and Sunday   9 am - 5 pm   [phone]     [facility] Labor and Delivery:  [phone]    If you need a medication refill, please contact your pharmacy. Please allow 3 business days for your refill to be completed.  As always, Thank you for trusting us with your healthcare needs!

## 2020-07-27 ENCOUNTER — TELEPHONE (OUTPATIENT)
Dept: FAMILY MEDICINE | Facility: CLINIC | Age: 20
End: 2020-07-27

## 2020-07-27 ASSESSMENT — PATIENT HEALTH QUESTIONNAIRE - PHQ9: SUM OF ALL RESPONSES TO PHQ QUESTIONS 1-9: 11

## 2020-07-27 NOTE — TELEPHONE ENCOUNTER
Pt last seen on 7/23/2020 for post op pain.    RN conducted PHQ9, pt scored 11, see results in flowsheets.    RN assisted in scheduling pt appt with Kinsey on 7/31/3030 as this is when pt preferred to be seen.    Patient verbalized understanding and agreed to plan.   Patient was given the opportunity to ask additional questions and have them answered. Patient had no further questions.   Brianna Lemon RN on 7/27/2020 at 4:12 PM

## 2020-07-27 NOTE — TELEPHONE ENCOUNTER
Patient calling would like to see Kinsey escoto post partum depression, please call to discuss and triage.

## 2020-07-31 ENCOUNTER — OFFICE VISIT (OUTPATIENT)
Dept: OBGYN | Facility: CLINIC | Age: 20
End: 2020-07-31
Payer: COMMERCIAL

## 2020-07-31 VITALS
TEMPERATURE: 97.1 F | OXYGEN SATURATION: 98 % | SYSTOLIC BLOOD PRESSURE: 132 MMHG | BODY MASS INDEX: 41.57 KG/M2 | HEART RATE: 63 BPM | WEIGHT: 234.6 LBS | HEIGHT: 63 IN | DIASTOLIC BLOOD PRESSURE: 88 MMHG

## 2020-07-31 PROCEDURE — 99214 OFFICE O/P EST MOD 30 MIN: CPT | Mod: 24 | Performed by: NURSE PRACTITIONER

## 2020-07-31 RX ORDER — CITALOPRAM HYDROBROMIDE 20 MG/1
TABLET ORAL
Qty: 30 TABLET | Refills: 1 | Status: SHIPPED | OUTPATIENT
Start: 2020-07-31 | End: 2020-09-01

## 2020-07-31 RX ORDER — DIAPHRAGMS, CONTOURED 65 MM-80MM
DIAPHRAGM VAGINAL
Status: CANCELLED | OUTPATIENT
Start: 2020-07-31

## 2020-07-31 ASSESSMENT — PAIN SCALES - GENERAL: PAINLEVEL: NO PAIN (0)

## 2020-07-31 ASSESSMENT — MIFFLIN-ST. JEOR: SCORE: 1798.27

## 2020-07-31 NOTE — PROGRESS NOTES
Subjective     Honey Ramirez is a 20 year old female who presents to clinic today for the following health issues:    HPI     Postpartum depression    Patient had a primary  section on 2020. Was induced due to GDM. Since then, has not been breast feeding. Feels she is having a hard time caring for baby as she is trying to recover from surgery. Has a lot of family help, but is feeling helpless because she is needing so much assistance. Even when she is tired, has trouble falling asleep in the event baby needs something. Feels like she can't do it all and therefore is a bad mother, has depressed mood. Denies concerns with anxiety. She knows it will get easier for her as she continues to recover from surgery-will be able to participate more in cares for the baby. Has been on antidepressants in the past-believes she did not do well on Zoloft. Would like to restart again now. Has been off for several years. When is able to feed and care for baby, feels well bonded. Denies any thoughts of harm to self, baby or others.     PHQ-9 score:    PHQ 2020   PHQ-9 Total Score 11   Q9: Thoughts of better off dead/self-harm past 2 weeks Not at all       Patient Active Problem List   Diagnosis     Asthma, intermittent     Class 3 obesity due to excess calories without serious comorbidity with body mass index (BMI) of 40.0 to 44.9 in adult     Motion sickness     Amenorrhea, secondary     Pregnancy, supervision, normal, first     Tobacco abuse     Gestational diabetes mellitus (GDM) in third trimester, gestational diabetes method of control unspecified     Past Surgical History:   Procedure Laterality Date      SECTION  2020       Social History     Tobacco Use     Smoking status: Former Smoker     Types: Cigarettes     Smokeless tobacco: Former User     Tobacco comment: stopped vaping 2019   Substance Use Topics     Alcohol use: No     Frequency: Never     Family History   Problem Relation Age  "of Onset     Diabetes Mother      Diabetes Father      Polycystic ovary syndrome Sister      Chronic Obstructive Pulmonary Disease Maternal Grandfather      Polycystic ovary syndrome Sister            Reviewed and updated as needed this visit by Provider         Review of Systems   Constitutional, HEENT, cardiovascular, pulmonary, gi and gu systems are negative, except as otherwise noted.      Objective    /88 (BP Location: Right arm, Patient Position: Sitting, Cuff Size: Adult Large)   Pulse 63   Temp 97.1  F (36.2  C) (Tympanic)   Ht 1.6 m (5' 3\")   Wt 106.4 kg (234 lb 9.6 oz)   LMP 10/01/2019 (Approximate)   SpO2 98%   Breastfeeding No   BMI 41.56 kg/m    Body mass index is 41.56 kg/m .  Physical Exam   GENERAL: healthy, alert and no distress  MS: no gross musculoskeletal defects noted, no edema  SKIN: no suspicious lesions or rashes  PSYCH: mentation appears normal, affect normal/bright    Assessment & Plan     1. Postpartum depression  We reviewed the PHQ-9 she did on the phone with our RN, discussed her symptoms, normal postpartum mood changes vs postpartum depression. She is reassured she is doing the best job she can. We discussed options and she does not feel she would be able to invest the time in counseling/therapy, would like to try medication as she feels she needs to do something. Discussed treatment options and will start with plan below. Discussed possible side effects, aware how long it may take to become effective. To ER immediately with thoughts of harm to self or other and she verbalizes understanding.   Patient to follow up in 2-4 weeks-ok via E-visit or telephone visit if she prefers. To contact clinic sooner if symptoms worsen and she agrees. Reviewed [facility] Crisis Line and their services as well. We also discussed when we would recommend transfer of care to primary care provider. Patient is given an opportunity to ask questions and have them answered.  - citalopram " (CELEXA) 20 MG tablet; Take 1/2 tablet by mouth at bedtime for 10 days, then increase to 1 tablet daily  Dispense: 30 tablet; Refill: 1     CASH Holt [facility]

## 2020-08-05 ENCOUNTER — TELEPHONE (OUTPATIENT)
Dept: OBGYN | Facility: CLINIC | Age: 20
End: 2020-08-05

## 2020-08-05 NOTE — TELEPHONE ENCOUNTER
Per patient, her Caesarian incision glue is coming off and incision seems to be opening. Please call to advise. Letha Garcia TC/Pt Rep

## 2020-08-06 NOTE — TELEPHONE ENCOUNTER
Pt had a  on 2020.  Per  OV note: incisions: healing well, well approximated without infection.    Unable to reach patient via phone. Left message to call clinic back at [phone] and ask for Women's Health.    Charlotte Hirsch RN

## 2020-08-06 NOTE — TELEPHONE ENCOUNTER
Pt states that the glue is starting to peel away from her skin.  She states the incision is closed.    Denies drainage, redness, pain, warmth at the incision site.   Pt does state that it is hard to keep area dry due to skin hangs over the incision.    Advised to just let the glue fall off on it's own and do not pull on it.  If th ends of the glue that are off of her skin are bothering her she can get the ends off but do not pull the rest off of her skin.  Also, advised pt to try and keep her skin as dry as possible so it does not break down or develop yeast.  I suggested she lift her skin up throughout the day and blot dry with a clean towel.  Advised pt to call back if incision seems to be getting infected or skin seems to be getting irritated or breaking down.    Pt verbalized understanding and agreed to plan.    Charlotte Hirsch RN

## 2020-08-25 ENCOUNTER — DOCUMENTATION ONLY (OUTPATIENT)
Dept: PEDIATRICS | Facility: CLINIC | Age: 20
End: 2020-08-25

## 2020-08-25 ENCOUNTER — MEDICAL CORRESPONDENCE (OUTPATIENT)
Dept: HEALTH INFORMATION MANAGEMENT | Facility: CLINIC | Age: 20
End: 2020-08-25

## 2020-08-26 NOTE — PROGRESS NOTES
Unable to reach patient via phone. Left message to call clinic back at [phone] and ask for Women's Health.    When patient calls back please relay Kinsey's message below.     Shona Gagnon RN on 8/26/2020 at 8:25 AM

## 2020-08-26 NOTE — PROGRESS NOTES
Pediatric NP note reviewed, patient was prescribed Celexa at her appointment with me in July, but per note below did not start as she did not like how medication made her feel in past. At this point then, given she also has a history of depression, would recommend follow up be with primary care. Kinsey CASTREJON CNP

## 2020-08-27 NOTE — PROGRESS NOTES
"Honey Ramirez is a 20 year old female who is being evaluated via a billable telephone visit.      The patient has been notified of following:     \"This telephone visit will be conducted via a call between you and your physician/provider. We have found that certain health care needs can be provided without the need for a physical exam.  This service lets us provide the care you need with a short phone conversation.  If a prescription is necessary we can send it directly to your pharmacy.  If lab work is needed we can place an order for that and you can then stop by our lab to have the test done at a later time.    Telephone visits are billed at different rates depending on your insurance coverage. During this emergency period, for some insurers they may be billed the same as an in-person visit.  Please reach out to your insurance provider with any questions.    If during the course of the call the physician/provider feels a telephone visit is not appropriate, you will not be charged for this service.\"    Patient has given verbal consent for Telephone visit?  Yes    What phone number would you like to be contacted at? [phone]    How would you like to obtain your AVS? Jessica Méndez requested a telephone visit for her postpartum checkup.  She had a primary  section.    OB History    Para Term  AB Living   1 1 1 0 0 1   SAB TAB Ectopic Multiple Live Births   0 0 0 0 1      # Outcome Date GA Lbr John/2nd Weight Sex Delivery Anes PTL Lv   1 Term 20 39w6d  3.147 kg (6 lb 15 oz) M CS-LTranv EPI N MAGUE      Complications: Fetal Intolerance, GDM, class A1      Apgar1: 2  Apgar5: 9      Since delivery, she has not been breast feeding.  She has not had a normal menses.  She has not had intercourse. Patient screened for postpartum depression: Had been seen a few weeks after delivery for postpartum depression concerns. Was given prescription for Celexa, but chose not to start. At this time, " prefers to remain off medication, not interested in counseling/therapy. Has a good support system at home with family and feels that when her moods are decreased, she can talk it out and feel improvement. Bonding well with baby. Denies thoughts of harm to self or others. Declines starting Celexa or alternative treatment.  Not interested in contraception.  Per patient,  section incision is intact, no redness, drainage or pain around incision site.    O: Patient answers questions appropriately. No audible respiratory difficulty.    A/P:  (Z39.2) Routine postpartum follow-up  (primary encounter diagnosis)  Comment:  I discussed the new pap recommendations regarding screening.  Explained the rationale for increased intervals between paps. Questions asked and answered.  Patient informed she is due for a pap smear and I have recommend an appointment in clinic in the next few months at her convenience to complete this.   - Contraception: Declines    (O99.345,  F53.0) Postpartum depression  Comment: Patient declines additional intervention at this time. Does not want to be on medication and not interested in counseling/therapy. Encouraged her to follow up in clinic if symptoms worsen. To ER immediately with thoughts of harm to self or other and she verbalizes understanding.     Phone call duration: 9 minutes    Kinsey CASTREJON CNP

## 2020-08-27 NOTE — PROGRESS NOTES
RN called and relayed Kinsey's advisement below. Pt states she would f/u with Family Practice. Pt also wanted to schedule PP visit over the phone, OK'd per last OV note on 7/31/2020 with Kinsey.    RN assisted in scheduling appt. Patient verbalized understanding and agreed to plan.     Brianna Lemon RN on 8/27/2020 at 9:59 AM

## 2020-08-27 NOTE — PROGRESS NOTES
Unable to reach patient via phone. Left message to call clinic back at [phone] and ask for Women's Health.    Charlotte Hirsch RN

## 2020-09-01 ENCOUNTER — VIRTUAL VISIT (OUTPATIENT)
Dept: OBGYN | Facility: CLINIC | Age: 20
End: 2020-09-01

## 2020-09-01 PROBLEM — Z34.00 PREGNANCY, SUPERVISION, NORMAL, FIRST: Status: RESOLVED | Noted: 2019-12-26 | Resolved: 2020-09-01

## 2020-09-01 PROCEDURE — 99207 ZZC POST PARTUM EXAM: CPT | Performed by: NURSE PRACTITIONER

## 2020-09-01 ASSESSMENT — PATIENT HEALTH QUESTIONNAIRE - PHQ9: SUM OF ALL RESPONSES TO PHQ QUESTIONS 1-9: 7

## 2020-09-22 ENCOUNTER — MEDICAL CORRESPONDENCE (OUTPATIENT)
Dept: HEALTH INFORMATION MANAGEMENT | Facility: CLINIC | Age: 20
End: 2020-09-22

## 2020-09-23 ENCOUNTER — DOCUMENTATION ONLY (OUTPATIENT)
Dept: PEDIATRICS | Facility: CLINIC | Age: 20
End: 2020-09-23

## 2020-09-23 NOTE — PROGRESS NOTES
EPDS was done in clinic today during child's 2 month visit and score was 14 with negative psychosis and negative screen for suicidal ideation.   Mom has hx of depression and is stressed ut about a lot of stuff.  She talked with Kinsey Zavala NP who took her off her meds and recommended she follow up with FP.  Mom has not dose this as she has been too busy.   She went back to work beginning of the month and has a court case going on.  Copy of EPDS was given to Walter today as well as educational material about postpartum depression.     Plan:   Appointment make michael Bran NP on 9/30/20 at 4:40 PM for follow up of post partum depression.  Behavioral health referral was not placed    CASH Whyte, CNP

## 2020-09-30 ENCOUNTER — OFFICE VISIT (OUTPATIENT)
Dept: FAMILY MEDICINE | Facility: CLINIC | Age: 20
End: 2020-09-30
Payer: COMMERCIAL

## 2020-09-30 VITALS
TEMPERATURE: 98.1 F | HEART RATE: 98 BPM | WEIGHT: 246 LBS | BODY MASS INDEX: 43.58 KG/M2 | DIASTOLIC BLOOD PRESSURE: 95 MMHG | SYSTOLIC BLOOD PRESSURE: 130 MMHG

## 2020-09-30 DIAGNOSIS — Z23 NEED FOR PROPHYLACTIC VACCINATION AND INOCULATION AGAINST INFLUENZA: ICD-10-CM

## 2020-09-30 DIAGNOSIS — E66.01 MORBID OBESITY (H): ICD-10-CM

## 2020-09-30 PROCEDURE — 90686 IIV4 VACC NO PRSV 0.5 ML IM: CPT | Performed by: NURSE PRACTITIONER

## 2020-09-30 PROCEDURE — 90471 IMMUNIZATION ADMIN: CPT | Performed by: NURSE PRACTITIONER

## 2020-09-30 PROCEDURE — 99213 OFFICE O/P EST LOW 20 MIN: CPT | Mod: 25 | Performed by: NURSE PRACTITIONER

## 2020-09-30 ASSESSMENT — ENCOUNTER SYMPTOMS
UNEXPECTED WEIGHT CHANGE: 1
SLEEP DISTURBANCE: 1
NERVOUS/ANXIOUS: 0
COUGH: 0
FATIGUE: 1
SHORTNESS OF BREATH: 0
APPETITE CHANGE: 1
DYSPHORIC MOOD: 1

## 2020-09-30 ASSESSMENT — PATIENT HEALTH QUESTIONNAIRE - PHQ9: SUM OF ALL RESPONSES TO PHQ QUESTIONS 1-9: 14

## 2020-09-30 NOTE — PROGRESS NOTES
Subjective     Honey Ramirez is a 20 year old female who presents to clinic today for the following health issues:    HPI     Delivered son 7/19/2020.  Reports feeling down, sad, depressed.  Does not feel she has support at home.  Feels she takes things personally.  Has gained back the weight she lost from pregnancy.  Not happy with her postpartum body.  Has a hard time sleeping at night, wakes every hour.  Doesn't really feel anxious.  Bottlefeeding.  Doesn't feel like her  is supportive.  States she cannot afford to go to the gym that he attends.  She wants to lose weight.  States she overeats and then has vomiting.  States she does not induce vomiting.  Started a few days ago.  She was prescribed Celexa but she never took it.  She is not interested in trying medication. She felt better when going to the gym and exercising in the past.  She works at [employer] and enjoys her job.  States she cannot stop eating even though she doesn't want to gain weight.      Concern - depression  Onset: 2 months  Description: depression. Didn't want to talk to anyone. Felt trapped  Intensity: severe  Progression of Symptoms:  Improving a little  Accompanying Signs & Symptoms: over eating, feeling fat. tired  Previous history of similar problem: 2017 was treated for depression  Precipitating factors:        Worsened by: discouraged by other peoples  Words and actions  Alleviating factors:        Improved by: talking to someone who wants to listen and cares  Therapies tried and outcome: None    Review of Systems   Constitutional: Positive for appetite change, fatigue and unexpected weight change.   Respiratory: Negative for cough and shortness of breath.    Cardiovascular: Negative for chest pain.   Psychiatric/Behavioral: Positive for dysphoric mood and sleep disturbance. Negative for self-injury and suicidal ideas. The patient is not nervous/anxious.             Objective    BP (!) 130/95   Pulse 98   Temp 98.1  F  "(36.7  C) (Oral)   Wt 111.6 kg (246 lb)   BMI 43.58 kg/m    Body mass index is 43.58 kg/m .  Physical Exam  Vitals signs reviewed.   HENT:      Head: Normocephalic.   Cardiovascular:      Rate and Rhythm: Normal rate and regular rhythm.   Pulmonary:      Effort: Pulmonary effort is normal.      Breath sounds: Normal breath sounds.   Neurological:      Mental Status: She is alert and oriented to person, place, and time.   Psychiatric:         Mood and Affect: Mood normal.         Behavior: Behavior normal.                Assessment & Plan     1. Postpartum depression  - she is not interested in medication options at this time.   - MENTAL HEALTH REFERRAL  - Adult; Outpatient Treatment; Individual/Couples/Family/Group Therapy/Health Psychology; [facility] [place] [facility] [phone]; We will contact you to schedule the appointment or please call with any questions    2. Morbid obesity (H)  - discussed importance of diet and exercise.   - NUTRITION REFERRAL    3. Need for prophylactic vaccination and inoculation against influenza  - INFLUENZA VACCINE IM > 6 MONTHS VALENT IIV4 [73944]  - Vaccine Administration, Initial [38904]       BMI:   Estimated body mass index is 43.58 kg/m  as calculated from the following:    Height as of 7/31/20: 1.6 m (5' 3\").    Weight as of this encounter: 111.6 kg (246 lb).   Weight management plan: Discussed healthy diet and exercise guidelines      Depression Screening Follow Up    PHQ 9/30/2020   PHQ-9 Total Score 14   Q9: Thoughts of better off dead/self-harm past 2 weeks Not at all           Return in about 6 weeks (around 11/11/2020) for worsening symptoms or failure to improve.    CASH Day [facility]    "

## 2020-10-01 ASSESSMENT — ASTHMA QUESTIONNAIRES: ACT_TOTALSCORE: 24

## 2020-10-21 ENCOUNTER — VIRTUAL VISIT (OUTPATIENT)
Dept: FAMILY MEDICINE | Facility: CLINIC | Age: 20
End: 2020-10-21
Payer: COMMERCIAL

## 2020-10-21 DIAGNOSIS — R11.2 NON-INTRACTABLE VOMITING WITH NAUSEA, UNSPECIFIED VOMITING TYPE: ICD-10-CM

## 2020-10-21 DIAGNOSIS — Z20.822 EXPOSURE TO 2019 NOVEL CORONAVIRUS: ICD-10-CM

## 2020-10-21 DIAGNOSIS — G43.909 MIGRAINE WITHOUT STATUS MIGRAINOSUS, NOT INTRACTABLE, UNSPECIFIED MIGRAINE TYPE: Primary | ICD-10-CM

## 2020-10-21 PROCEDURE — 99213 OFFICE O/P EST LOW 20 MIN: CPT | Mod: 95 | Performed by: NURSE PRACTITIONER

## 2020-10-21 RX ORDER — ONDANSETRON 8 MG/1
8 TABLET, FILM COATED ORAL EVERY 8 HOURS PRN
Qty: 20 TABLET | Refills: 0 | Status: SHIPPED | OUTPATIENT
Start: 2020-10-21 | End: 2020-11-06

## 2020-10-21 ASSESSMENT — ENCOUNTER SYMPTOMS
APPETITE CHANGE: 1
DIZZINESS: 1
SHORTNESS OF BREATH: 0
NAUSEA: 1
CHILLS: 0
VOMITING: 1
ABDOMINAL PAIN: 1
FEVER: 0
HEADACHES: 1
FATIGUE: 1
SLEEP DISTURBANCE: 1
COUGH: 0

## 2020-10-22 DIAGNOSIS — G43.909 MIGRAINE WITHOUT STATUS MIGRAINOSUS, NOT INTRACTABLE, UNSPECIFIED MIGRAINE TYPE: ICD-10-CM

## 2020-10-22 LAB
ALBUMIN SERPL-MCNC: 4 G/DL (ref 3.4–5)
ALP SERPL-CCNC: 106 U/L (ref 40–150)
ALT SERPL W P-5'-P-CCNC: 46 U/L (ref 0–50)
ANION GAP SERPL CALCULATED.3IONS-SCNC: 5 MMOL/L (ref 3–14)
AST SERPL W P-5'-P-CCNC: 16 U/L (ref 0–45)
BILIRUB SERPL-MCNC: 0.5 MG/DL (ref 0.2–1.3)
BUN SERPL-MCNC: 13 MG/DL (ref 7–30)
CALCIUM SERPL-MCNC: 9.3 MG/DL (ref 8.5–10.1)
CHLORIDE SERPL-SCNC: 108 MMOL/L (ref 94–109)
CO2 SERPL-SCNC: 28 MMOL/L (ref 20–32)
CREAT SERPL-MCNC: 0.87 MG/DL (ref 0.52–1.04)
ERYTHROCYTE [DISTWIDTH] IN BLOOD BY AUTOMATED COUNT: 13.9 % (ref 10–15)
GFR SERPL CREATININE-BSD FRML MDRD: >90 ML/MIN/{1.73_M2}
GLUCOSE SERPL-MCNC: 107 MG/DL (ref 70–99)
HBA1C MFR BLD: 5.4 % (ref 0–5.6)
HCT VFR BLD AUTO: 43 % (ref 35–47)
HGB BLD-MCNC: 13.8 G/DL (ref 11.7–15.7)
MCH RBC QN AUTO: 27.3 PG (ref 26.5–33)
MCHC RBC AUTO-ENTMCNC: 32.1 G/DL (ref 31.5–36.5)
MCV RBC AUTO: 85 FL (ref 78–100)
PLATELET # BLD AUTO: 336 10E9/L (ref 150–450)
POTASSIUM SERPL-SCNC: 4.4 MMOL/L (ref 3.4–5.3)
PROT SERPL-MCNC: 8.5 G/DL (ref 6.8–8.8)
RBC # BLD AUTO: 5.06 10E12/L (ref 3.8–5.2)
SODIUM SERPL-SCNC: 141 MMOL/L (ref 133–144)
TSH SERPL DL<=0.005 MIU/L-ACNC: 1.13 MU/L (ref 0.4–4)
WBC # BLD AUTO: 8 10E9/L (ref 4–11)

## 2020-10-22 PROCEDURE — 83036 HEMOGLOBIN GLYCOSYLATED A1C: CPT | Performed by: NURSE PRACTITIONER

## 2020-10-22 PROCEDURE — 84443 ASSAY THYROID STIM HORMONE: CPT | Performed by: NURSE PRACTITIONER

## 2020-10-22 PROCEDURE — 85027 COMPLETE CBC AUTOMATED: CPT | Performed by: NURSE PRACTITIONER

## 2020-10-22 PROCEDURE — 80053 COMPREHEN METABOLIC PANEL: CPT | Performed by: NURSE PRACTITIONER

## 2020-10-23 ENCOUNTER — TELEPHONE (OUTPATIENT)
Dept: FAMILY MEDICINE | Facility: CLINIC | Age: 20
End: 2020-10-23

## 2020-10-23 DIAGNOSIS — Z20.822 EXPOSURE TO 2019 NOVEL CORONAVIRUS: ICD-10-CM

## 2020-10-23 DIAGNOSIS — R11.2 NON-INTRACTABLE VOMITING WITH NAUSEA, UNSPECIFIED VOMITING TYPE: ICD-10-CM

## 2020-10-23 DIAGNOSIS — R03.0 ELEVATED BLOOD PRESSURE READING WITHOUT DIAGNOSIS OF HYPERTENSION: Primary | ICD-10-CM

## 2020-10-23 PROCEDURE — U0003 INFECTIOUS AGENT DETECTION BY NUCLEIC ACID (DNA OR RNA); SEVERE ACUTE RESPIRATORY SYNDROME CORONAVIRUS 2 (SARS-COV-2) (CORONAVIRUS DISEASE [COVID-19]), AMPLIFIED PROBE TECHNIQUE, MAKING USE OF HIGH THROUGHPUT TECHNOLOGIES AS DESCRIBED BY CMS-2020-01-R: HCPCS | Performed by: NURSE PRACTITIONER

## 2020-10-23 NOTE — TELEPHONE ENCOUNTER
I spoke to the patient and she wants the DME order faxed to [facility] pharmacy at fax # [phone].  She is going to pay for it herself and she declined using a medical supply company.  I faxed the order to the pharmacy.  Cathy Ma,

## 2020-10-23 NOTE — TELEPHONE ENCOUNTER
Called patient and gave providers message/ instructions.  Patient states (s)he understands this.   Patient doesn't have a BP machine at home.   This RN pended order for BP machine.  Routing to provider to print/sign.   Patient would like order faxed to pended pharmacy.    Nely GUZMANN, RN

## 2020-10-23 NOTE — TELEPHONE ENCOUNTER
Left message on answering machine for patient to call back.  [facility] [phone]  Nora Guardado RN

## 2020-10-23 NOTE — TELEPHONE ENCOUNTER
As we discussed during her virtual visit, I would like her to start checking her blood pressures at home 1-2 times per day and keep a log.  Call office in 4-5 days with readings.  When she was in the clinic her blood pressure was elevated and I want to see if this could be causing the migraines.      CASH Day CNP

## 2020-10-23 NOTE — TELEPHONE ENCOUNTER
Daly Bran, APRN CNP  P An Julia             Please call and let Honey know her thyroid, blood sugars and blood counts look good.  Please see how she is feeling in regards to migraines and the squeezing sensation she has been experiencing.  Please see if she has any blood pressure readings to report.

## 2020-10-23 NOTE — TELEPHONE ENCOUNTER
Called patient.      She said she has not been checking her BPs at home.   Still having the squeezing migraines daily.  They have not changed.    Routing to provider to advise.  Nely GUZMANN, RN

## 2020-10-24 LAB
SARS-COV-2 RNA SPEC QL NAA+PROBE: NOT DETECTED
SPECIMEN SOURCE: NORMAL

## 2020-10-25 ENCOUNTER — MYC MEDICAL ADVICE (OUTPATIENT)
Dept: FAMILY MEDICINE | Facility: CLINIC | Age: 20
End: 2020-10-25

## 2020-10-30 ENCOUNTER — MYC MEDICAL ADVICE (OUTPATIENT)
Dept: FAMILY MEDICINE | Facility: CLINIC | Age: 20
End: 2020-10-30

## 2020-10-30 DIAGNOSIS — I10 ESSENTIAL HYPERTENSION: Primary | ICD-10-CM

## 2020-10-30 NOTE — TELEPHONE ENCOUNTER
See virtual appointment with Daly Bran NP 10/21/20.  Patient was asked to check home BPs since her clinic one was high. Patient is not on any hypertensive medications.    Routing to provider to advise.  Nely GUZMANN, RN

## 2020-11-01 RX ORDER — LOSARTAN POTASSIUM 50 MG/1
50 TABLET ORAL DAILY
Qty: 30 TABLET | Refills: 0 | Status: SHIPPED | OUTPATIENT
Start: 2020-11-01 | End: 2020-11-16

## 2020-11-02 NOTE — TELEPHONE ENCOUNTER
Please call Honey and let her know I have reviewed her blood pressures, which are running very high.  High blood pressures may explain why she has been having migraine headaches.  I would like to start her on medication, called Losartan, which she will take once daily.  I would like her to call back with BP readings in 2 weeks.      CASH Day CNP

## 2020-11-06 ENCOUNTER — VIRTUAL VISIT (OUTPATIENT)
Dept: FAMILY MEDICINE | Facility: CLINIC | Age: 20
End: 2020-11-06
Payer: COMMERCIAL

## 2020-11-06 DIAGNOSIS — I10 ESSENTIAL HYPERTENSION: ICD-10-CM

## 2020-11-06 DIAGNOSIS — F41.8 POSTPARTUM ANXIETY: Primary | ICD-10-CM

## 2020-11-06 PROCEDURE — 99214 OFFICE O/P EST MOD 30 MIN: CPT | Mod: 95 | Performed by: NURSE PRACTITIONER

## 2020-11-06 ASSESSMENT — ANXIETY QUESTIONNAIRES
2. NOT BEING ABLE TO STOP OR CONTROL WORRYING: NEARLY EVERY DAY
7. FEELING AFRAID AS IF SOMETHING AWFUL MIGHT HAPPEN: NEARLY EVERY DAY
IF YOU CHECKED OFF ANY PROBLEMS ON THIS QUESTIONNAIRE, HOW DIFFICULT HAVE THESE PROBLEMS MADE IT FOR YOU TO DO YOUR WORK, TAKE CARE OF THINGS AT HOME, OR GET ALONG WITH OTHER PEOPLE: EXTREMELY DIFFICULT
1. FEELING NERVOUS, ANXIOUS, OR ON EDGE: NEARLY EVERY DAY
3. WORRYING TOO MUCH ABOUT DIFFERENT THINGS: NEARLY EVERY DAY
6. BECOMING EASILY ANNOYED OR IRRITABLE: NEARLY EVERY DAY
5. BEING SO RESTLESS THAT IT IS HARD TO SIT STILL: NEARLY EVERY DAY
GAD7 TOTAL SCORE: 21

## 2020-11-06 ASSESSMENT — ENCOUNTER SYMPTOMS
HEADACHES: 1
APPETITE CHANGE: 1
DECREASED CONCENTRATION: 1
FATIGUE: 1
NERVOUS/ANXIOUS: 1
DYSPHORIC MOOD: 1
SLEEP DISTURBANCE: 1

## 2020-11-06 ASSESSMENT — PATIENT HEALTH QUESTIONNAIRE - PHQ9
SUM OF ALL RESPONSES TO PHQ QUESTIONS 1-9: 25
5. POOR APPETITE OR OVEREATING: NEARLY EVERY DAY

## 2020-11-06 NOTE — PROGRESS NOTES
"Honey Ramirez is a 20 year old female who is being evaluated via a billable video visit.      The patient has been notified of following:     \"This telephone visit will be conducted via a call between you and your physician/provider. We have found that certain health care needs can be provided without the need for an in-person physical exam.  This service lets us provide the care you need with a telephone conversation.  If a prescription is necessary we can send it directly to your pharmacy.  If lab work is needed we can place an order for that and you can then stop by our lab to have the test done at a later time.    Telephone visits are billed at different rates depending on your insurance coverage.  Please reach out to your insurance provider with any questions.    If during the course of the call the physician/provider feels a Telephone visit is not appropriate, you will not be charged for this service.\"    Patient has given verbal consent for Telephone visit? Yes  How would you like to obtain your AVS? MyChart  If you are dropped from the Telephone visit, the video invite should be resent to: Text to cell phone: [phone]  Will anyone else be joining your Telephone visit? No    Subjective     Honey Ramirez is a 20 year old female who presents today via Telephone visit for the following health issues:    HPI    Hypertension Follow-up      Do you check your blood pressure regularly outside of the clinic? Yes     Are you following a low salt diet? Less salt     Are your blood pressures ever more than 140 on the top number (systolic) OR more   than 90 on the bottom number (diastolic), for example 140/90? No  Reports she has not been checking her BP routinely.  Yesterday BP was 121/110, however states she has been under a lot of stress.  Denies side effects from medication.      Anxiety Follow-Up    How are you doing with your anxiety since your last visit? Worsened     Are you having other symptoms " that might be associated with anxiety? Yes:  trouble falling asleep     Have you had a significant life event? OTHER: court issues      Are you feeling depressed? Yes:  thinking about doing therapy. would like to get on medication for anxiety/depression    Do you have any concerns with your use of alcohol or other drugs? No    She had a baby 4 months ago.  States she has been more stressed out with lack of motivation.  Reports trouble falling and staying asleep.  Reports Mom is supportive to help with baby.  Reports anxiety is just as problemsome as depression. Reports appetite varies.  Reports she feels down often, tearful at times.  Unable to identify triggers to anxiety.  Reports she has been going out more to help self, went to the chiropractor and that made her feel better temporarily. She is considering therapy.  Reports she has had thoughts that she would be better off dead but denies having a plan.  She was prescribed Zoloft in the past but only took for about 5 days.  She is sexually active, not currently on birth control and is not breast feeding.        Social History     Tobacco Use     Smoking status: Former Smoker     Types: Cigarettes     Smokeless tobacco: Former User     Tobacco comment: stopped vaping 11/2019   Substance Use Topics     Alcohol use: Yes     Frequency: Never     Comment: socially     Drug use: No     DELICIA-7 SCORE 11/6/2020   Total Score 21     PHQ 9/1/2020 9/30/2020 11/6/2020   PHQ-9 Total Score 7 14 25   Q9: Thoughts of better off dead/self-harm past 2 weeks Not at all Not at all Several days           Telephone Start Time: 11:08        Review of Systems   Constitutional: Positive for appetite change and fatigue.   Neurological: Positive for headaches.   Psychiatric/Behavioral: Positive for decreased concentration, dysphoric mood, mood changes and sleep disturbance. Negative for self-injury and suicidal ideas. The patient is nervous/anxious.           Objective           Vitals:  No  vitals were obtained today due to virtual visit.    Physical Exam     GENERAL: Healthy, alert and no distress  RESP: No audible wheeze, cough, or visible cyanosis.  No visible retractions or increased work of breathing.    NEURO: Mentation and speech appropriate for age.  PSYCH: Mentation appears normal, affect normal/bright, judgement and insight intact, normal speech.           Assessment & Plan     1. Postpartum anxiety  - advised to stop medication and go to the ER if she develops SI/HI.   - sertraline (ZOLOFT) 50 MG tablet; Take 1 tablet (50 mg) by mouth daily  Dispense: 30 tablet; Refill: 1  - MENTAL HEALTH REFERRAL  - Adult; Psychiatry; Psychiatry and Psychotherapy (Individual/Couple/Family Therapy); [facility] [phone]; Yes; Other: Enter in Comments box; Yes; We will co...    2. Postpartum depression  - advised to stop medication and go to the ER if she develops SI/HI.   - sertraline (ZOLOFT) 50 MG tablet; Take 1 tablet (50 mg) by mouth daily  Dispense: 30 tablet; Refill: 1  - MENTAL HEALTH REFERRAL  - Adult; Psychiatry; Psychiatry and Psychotherapy (Individual/Couple/Family Therapy); [facility] [phone]; Yes; Other: Enter in Comments box; Yes; We will co...    3. Essential hypertension  - continue Losartan 50 mg daily.  Advised to check BP daily, keep log, and call next week with 4-5 readings.          Depression Screening Follow Up    PHQ 11/6/2020   PHQ-9 Total Score 25   Q9: Thoughts of better off dead/self-harm past 2 weeks Several days                  Follow Up      Follow Up Actions Taken  Crisis resource information provided in the After Visit Summary  Mental Health Referral placed        Return in about 4 weeks (around 12/4/2020) for Anxiety/Depression.    CASH Day [facility] [place]      Telephone Visit Details    Type of service:   Telephone Visit    Telephone Visit Total Visit Time: 16 minutes    Distant Location (provider location):  [facility]

## 2020-11-07 ENCOUNTER — HEALTH MAINTENANCE LETTER (OUTPATIENT)
Age: 20
End: 2020-11-07

## 2020-11-07 ENCOUNTER — MYC MEDICAL ADVICE (OUTPATIENT)
Dept: FAMILY MEDICINE | Facility: CLINIC | Age: 20
End: 2020-11-07

## 2020-11-07 ASSESSMENT — ANXIETY QUESTIONNAIRES: GAD7 TOTAL SCORE: 21

## 2020-11-09 ENCOUNTER — MYC MEDICAL ADVICE (OUTPATIENT)
Dept: FAMILY MEDICINE | Facility: CLINIC | Age: 20
End: 2020-11-09

## 2020-11-09 DIAGNOSIS — F41.8 POSTPARTUM ANXIETY: Primary | ICD-10-CM

## 2020-11-09 RX ORDER — ESCITALOPRAM OXALATE 5 MG/1
5 TABLET ORAL DAILY
Qty: 30 TABLET | Refills: 1 | Status: SHIPPED | OUTPATIENT
Start: 2020-11-09 | End: 2021-02-02

## 2020-11-11 ENCOUNTER — E-VISIT (OUTPATIENT)
Dept: FAMILY MEDICINE | Facility: CLINIC | Age: 20
End: 2020-11-11
Payer: COMMERCIAL

## 2020-11-11 DIAGNOSIS — R11.0 NAUSEA: Primary | ICD-10-CM

## 2020-11-11 PROCEDURE — 99422 OL DIG E/M SVC 11-20 MIN: CPT | Performed by: FAMILY MEDICINE

## 2020-11-11 RX ORDER — ONDANSETRON 4 MG/1
4-8 TABLET, ORALLY DISINTEGRATING ORAL EVERY 8 HOURS PRN
Qty: 30 TABLET | Refills: 0 | Status: SHIPPED | OUTPATIENT
Start: 2020-11-11 | End: 2021-11-24

## 2020-11-15 ENCOUNTER — MYC MEDICAL ADVICE (OUTPATIENT)
Dept: FAMILY MEDICINE | Facility: CLINIC | Age: 20
End: 2020-11-15

## 2020-11-15 DIAGNOSIS — I10 ESSENTIAL HYPERTENSION: ICD-10-CM

## 2020-11-16 RX ORDER — LOSARTAN POTASSIUM 100 MG/1
100 TABLET ORAL DAILY
Qty: 30 TABLET | Refills: 0 | Status: SHIPPED | OUTPATIENT
Start: 2020-11-16 | End: 2020-12-17

## 2020-12-16 DIAGNOSIS — I10 ESSENTIAL HYPERTENSION: ICD-10-CM

## 2020-12-17 RX ORDER — LOSARTAN POTASSIUM 100 MG/1
100 TABLET ORAL DAILY
Qty: 30 TABLET | Refills: 0 | Status: SHIPPED | OUTPATIENT
Start: 2020-12-17 | End: 2021-11-24

## 2020-12-22 ENCOUNTER — TELEPHONE (OUTPATIENT)
Dept: FAMILY MEDICINE | Facility: CLINIC | Age: 20
End: 2020-12-22

## 2020-12-22 NOTE — TELEPHONE ENCOUNTER
The patient is scheduled on 12/23/20 with Daly Bran NP.  This appointment needs to be rescheduled as the provider is going to be unavailable.  Patient was contacted by: Phone. Left 2 messages for patient to call and reschedule appointment.  MyChart message sent.  Cathy Ma,

## 2021-01-20 ENCOUNTER — DOCUMENTATION ONLY (OUTPATIENT)
Dept: PEDIATRICS | Facility: CLINIC | Age: 21
End: 2021-01-20

## 2021-01-20 ENCOUNTER — MEDICAL CORRESPONDENCE (OUTPATIENT)
Dept: HEALTH INFORMATION MANAGEMENT | Facility: CLINIC | Age: 21
End: 2021-01-20

## 2021-01-20 NOTE — PROGRESS NOTES
"EPDS was done in clinic today during child's 6 month visit and score was 17 with negative psychosis and negative screen for suicidal ideation.   Honey has history of depression treated with antidepressants..  Honey reports dad moved out and moved in with PG parents.  They are \"still together.\"  Honey is getting support for her family.  Honey was placed on antidepressant and was good at taking it and then not sure what happened and stopped.  She is wanting to do therapy and does have an appointment in February.   Copy of EPDS was given to Honey today as well as educational material about postpartum depression.     Plan:   Follow up within 1-2 weeks with OB/primary care provider was recommended.  Behavioral health referral was not placed.     CASH Whyte, CNP    "

## 2021-02-02 ENCOUNTER — VIRTUAL VISIT (OUTPATIENT)
Dept: PSYCHOLOGY | Facility: CLINIC | Age: 21
End: 2021-02-02
Payer: COMMERCIAL

## 2021-02-02 DIAGNOSIS — F33.1 MODERATE EPISODE OF RECURRENT MAJOR DEPRESSIVE DISORDER (H): Primary | ICD-10-CM

## 2021-02-02 DIAGNOSIS — F41.1 GAD (GENERALIZED ANXIETY DISORDER): ICD-10-CM

## 2021-02-02 DIAGNOSIS — F43.10 PTSD (POST-TRAUMATIC STRESS DISORDER): Primary | ICD-10-CM

## 2021-02-02 DIAGNOSIS — F33.1 MODERATE EPISODE OF RECURRENT MAJOR DEPRESSIVE DISORDER (H): ICD-10-CM

## 2021-02-02 DIAGNOSIS — F43.10 PTSD (POST-TRAUMATIC STRESS DISORDER): ICD-10-CM

## 2021-02-02 DIAGNOSIS — G47.00 INSOMNIA, UNSPECIFIED TYPE: ICD-10-CM

## 2021-02-02 PROCEDURE — 90791 PSYCH DIAGNOSTIC EVALUATION: CPT | Mod: 95 | Performed by: PSYCHOLOGIST

## 2021-02-02 PROCEDURE — 99204 OFFICE O/P NEW MOD 45 MIN: CPT | Mod: 95 | Performed by: PSYCHIATRY & NEUROLOGY

## 2021-02-02 RX ORDER — BUPROPION HYDROCHLORIDE 150 MG/1
150 TABLET ORAL EVERY MORNING
Qty: 30 TABLET | Refills: 1 | Status: SHIPPED | OUTPATIENT
Start: 2021-02-02 | End: 2021-03-21

## 2021-02-02 RX ORDER — HYDROXYZINE HYDROCHLORIDE 25 MG/1
25 TABLET, FILM COATED ORAL 3 TIMES DAILY PRN
Qty: 30 TABLET | Refills: 0 | Status: SHIPPED | OUTPATIENT
Start: 2021-02-02 | End: 2021-11-24

## 2021-02-02 SDOH — HEALTH STABILITY: MENTAL HEALTH
STRESS IS WHEN SOMEONE FEELS TENSE, NERVOUS, ANXIOUS, OR CAN'T SLEEP AT NIGHT BECAUSE THEIR MIND IS TROUBLED. HOW STRESSED ARE YOU?: RATHER MUCH

## 2021-02-02 SDOH — SOCIAL STABILITY: SOCIAL INSECURITY: WITHIN THE LAST YEAR, HAVE YOU BEEN AFRAID OF YOUR PARTNER OR EX-PARTNER?: NO

## 2021-02-02 SDOH — SOCIAL STABILITY: SOCIAL NETWORK: ARE YOU MARRIED, WIDOWED, DIVORCED, SEPARATED, NEVER MARRIED, OR LIVING WITH A PARTNER?: NEVER MARRIED

## 2021-02-02 SDOH — SOCIAL STABILITY: SOCIAL NETWORK: HOW OFTEN DO YOU GET TOGETHER WITH FRIENDS OR RELATIVES?: NOT ASKED

## 2021-02-02 SDOH — HEALTH STABILITY: MENTAL HEALTH: HOW MANY STANDARD DRINKS CONTAINING ALCOHOL DO YOU HAVE ON A TYPICAL DAY?: 1 OR 2

## 2021-02-02 SDOH — ECONOMIC STABILITY: TRANSPORTATION INSECURITY
IN THE PAST 12 MONTHS, HAS THE LACK OF TRANSPORTATION KEPT YOU FROM MEDICAL APPOINTMENTS OR FROM GETTING MEDICATIONS?: NO

## 2021-02-02 SDOH — ECONOMIC STABILITY: TRANSPORTATION INSECURITY
IN THE PAST 12 MONTHS, HAS LACK OF TRANSPORTATION KEPT YOU FROM MEETINGS, WORK, OR FROM GETTING THINGS NEEDED FOR DAILY LIVING?: NO

## 2021-02-02 SDOH — SOCIAL STABILITY: SOCIAL INSECURITY
WITHIN THE LAST YEAR, HAVE YOU BEEN KICKED, HIT, SLAPPED, OR OTHERWISE PHYSICALLY HURT BY YOUR PARTNER OR EX-PARTNER?: NO

## 2021-02-02 SDOH — HEALTH STABILITY: PHYSICAL HEALTH: ON AVERAGE, HOW MANY DAYS PER WEEK DO YOU ENGAGE IN MODERATE TO STRENUOUS EXERCISE (LIKE A BRISK WALK)?: 0 DAYS

## 2021-02-02 SDOH — ECONOMIC STABILITY: FOOD INSECURITY: WITHIN THE PAST 12 MONTHS, YOU WORRIED THAT YOUR FOOD WOULD RUN OUT BEFORE YOU GOT MONEY TO BUY MORE.: NEVER TRUE

## 2021-02-02 SDOH — SOCIAL STABILITY: SOCIAL INSECURITY: WITHIN THE LAST YEAR, HAVE YOU BEEN HUMILIATED OR EMOTIONALLY ABUSED IN OTHER WAYS BY YOUR PARTNER OR EX-PARTNER?: NO

## 2021-02-02 SDOH — SOCIAL STABILITY: SOCIAL NETWORK: HOW OFTEN DO YOU ATTEND CHURCH OR RELIGIOUS SERVICES?: NOT ASKED

## 2021-02-02 SDOH — SOCIAL STABILITY: SOCIAL NETWORK: HOW OFTEN DO YOU ATTENT MEETINGS OF THE CLUB OR ORGANIZATION YOU BELONG TO?: NOT ASKED

## 2021-02-02 SDOH — SOCIAL STABILITY: SOCIAL NETWORK
DO YOU BELONG TO ANY CLUBS OR ORGANIZATIONS SUCH AS CHURCH GROUPS UNIONS, FRATERNAL OR ATHLETIC GROUPS, OR SCHOOL GROUPS?: NOT ASKED

## 2021-02-02 SDOH — SOCIAL STABILITY: SOCIAL NETWORK: IN A TYPICAL WEEK, HOW MANY TIMES DO YOU TALK ON THE PHONE WITH FAMILY, FRIENDS, OR NEIGHBORS?: NOT ASKED

## 2021-02-02 SDOH — HEALTH STABILITY: PHYSICAL HEALTH: ON AVERAGE, HOW MANY MINUTES DO YOU ENGAGE IN EXERCISE AT THIS LEVEL?: 0 MIN

## 2021-02-02 SDOH — ECONOMIC STABILITY: INCOME INSECURITY: HOW HARD IS IT FOR YOU TO PAY FOR THE VERY BASICS LIKE FOOD, HOUSING, MEDICAL CARE, AND HEATING?: SOMEWHAT HARD

## 2021-02-02 SDOH — ECONOMIC STABILITY: FOOD INSECURITY: WITHIN THE PAST 12 MONTHS, THE FOOD YOU BOUGHT JUST DIDN'T LAST AND YOU DIDN'T HAVE MONEY TO GET MORE.: NEVER TRUE

## 2021-02-02 SDOH — HEALTH STABILITY: MENTAL HEALTH: HOW OFTEN DO YOU HAVE 6 OR MORE DRINKS ON ONE OCCASION?: NOT ASKED

## 2021-02-02 SDOH — SOCIAL STABILITY: SOCIAL INSECURITY
WITHIN THE LAST YEAR, HAVE TO BEEN RAPED OR FORCED TO HAVE ANY KIND OF SEXUAL ACTIVITY BY YOUR PARTNER OR EX-PARTNER?: NO

## 2021-02-02 ASSESSMENT — COLUMBIA-SUICIDE SEVERITY RATING SCALE - C-SSRS
TOTAL  NUMBER OF ACTUAL ATTEMPTS LIFETIME: 1
2. HAVE YOU ACTUALLY HAD ANY THOUGHTS OF KILLING YOURSELF?: YES
ATTEMPT PAST THREE MONTHS: NO
2. HAVE YOU ACTUALLY HAD ANY THOUGHTS OF KILLING YOURSELF LIFETIME?: YES
5. HAVE YOU STARTED TO WORK OUT OR WORKED OUT THE DETAILS OF HOW TO KILL YOURSELF? DO YOU INTEND TO CARRY OUT THIS PLAN?: NO
TOTAL  NUMBER OF ABORTED OR SELF INTERRUPTED ATTEMPTS PAST 3 MONTHS: NO
1. IN THE PAST MONTH, HAVE YOU WISHED YOU WERE DEAD OR WISHED YOU COULD GO TO SLEEP AND NOT WAKE UP?: YES
TOTAL  NUMBER OF INTERRUPTED ATTEMPTS PAST 3 MONTHS: NO
6. HAVE YOU EVER DONE ANYTHING, STARTED TO DO ANYTHING, OR PREPARED TO DO ANYTHING TO END YOUR LIFE?: NO
4. HAVE YOU HAD THESE THOUGHTS AND HAD SOME INTENTION OF ACTING ON THEM?: NO
5. HAVE YOU STARTED TO WORK OUT OR WORKED OUT THE DETAILS OF HOW TO KILL YOURSELF? DO YOU INTEND TO CARRY OUT THIS PLAN?: NO
TOTAL  NUMBER OF INTERRUPTED ATTEMPTS LIFETIME: NO
1. IN THE PAST MONTH, HAVE YOU WISHED YOU WERE DEAD OR WISHED YOU COULD GO TO SLEEP AND NOT WAKE UP?: YES
ATTEMPT LIFETIME: YES
3. HAVE YOU BEEN THINKING ABOUT HOW YOU MIGHT KILL YOURSELF?: NO
4. HAVE YOU HAD THESE THOUGHTS AND HAD SOME INTENTION OF ACTING ON THEM?: NO
TOTAL  NUMBER OF ABORTED OR SELF INTERRUPTED ATTEMPTS PAST LIFETIME: NO
6. HAVE YOU EVER DONE ANYTHING, STARTED TO DO ANYTHING, OR PREPARED TO DO ANYTHING TO END YOUR LIFE?: NO

## 2021-02-02 ASSESSMENT — ANXIETY QUESTIONNAIRES
GAD7 TOTAL SCORE: 17
7. FEELING AFRAID AS IF SOMETHING AWFUL MIGHT HAPPEN: MORE THAN HALF THE DAYS
4. TROUBLE RELAXING: NEARLY EVERY DAY
3. WORRYING TOO MUCH ABOUT DIFFERENT THINGS: NEARLY EVERY DAY
2. NOT BEING ABLE TO STOP OR CONTROL WORRYING: NEARLY EVERY DAY
1. FEELING NERVOUS, ANXIOUS, OR ON EDGE: MORE THAN HALF THE DAYS
6. BECOMING EASILY ANNOYED OR IRRITABLE: MORE THAN HALF THE DAYS
GAD7 TOTAL SCORE: 17
5. BEING SO RESTLESS THAT IT IS HARD TO SIT STILL: MORE THAN HALF THE DAYS
GAD7 TOTAL SCORE: 17
7. FEELING AFRAID AS IF SOMETHING AWFUL MIGHT HAPPEN: MORE THAN HALF THE DAYS

## 2021-02-02 ASSESSMENT — PATIENT HEALTH QUESTIONNAIRE - PHQ9
SUM OF ALL RESPONSES TO PHQ QUESTIONS 1-9: 17
SUM OF ALL RESPONSES TO PHQ QUESTIONS 1-9: 17
10. IF YOU CHECKED OFF ANY PROBLEMS, HOW DIFFICULT HAVE THESE PROBLEMS MADE IT FOR YOU TO DO YOUR WORK, TAKE CARE OF THINGS AT HOME, OR GET ALONG WITH OTHER PEOPLE: SOMEWHAT DIFFICULT

## 2021-02-02 NOTE — Clinical Note
Just GERTRUDIS.  Saw patient for intake last week.  Started Wellbutrin XL.  I will see her back in about 4-6 weeks for follow-up.  Let me know if you have any questions or concerns. Thanks!

## 2021-02-02 NOTE — PATIENT INSTRUCTIONS
Name: Honey Ramirez  YOB: 2000  Date: February 2, 2021   My primary care provider: Skylar Moore  My primary care clinic: [facility]  My prescriber: CASH Vidal CNP  Other care team support:     My Triggers:  Relationship conflict fight with boyfriend, Home environment family saying they don't know how to help me and Legal Difficulties facing probation     Additional People, Places, and Things that I can access for support: friends         What is important to me and makes life worth living: son, family, friends .         GREEN    Good Control  1. I feel good  2. No suicidal thoughts   3. Can work, sleep and play      Action Steps  1. Self-care: balanced meals, exercising, sleep practices, etc.  2. Take your medications as prescribed.  3. Continue meetings with therapist and prescriber.  4.  Do the healthy things that I enjoy.           YELLOW  Getting Worse  I have ANY of these:  1. I do not feel good  2. Difficulty Concentrating  3. Sleep is changing  4. Increase/Change in my thoughts to hurt self and/or others, but I can still manage and not act on it.   5. Not taking care of self.             Action Steps (in addition to the above):  1. Inform your therapist and psychiatric prescriber/PCP.  2. Keep taking your medications as prescribed.    3. Turn to people you can ask for help.  4. Use internal coping strategies -see below.  5. Create safe environment: notify friends/family of increase in symptoms           RED  Get Help  If I have ANY of these:  1. Current and uncontrollable thoughts and/or behaviors to hurt self and/or others.    Actions to manage my safety  1. Contact your emergency person brayan  2. Call my crisis team- [facility] [phone] [facility]  3. Or Call 911 or go to the emergency room right away  4.  Text HOME to [phone]        My Internal Coping Strategies include the following:  take a bath and use my coping skills    [End for  Brief Safety Plan]     Safety Concerns  How To Identify Situations That Make Your Mental Health Worse:  Triggers are things that make your mental health worse.  Look at the list below to help you find your triggers and what you can do about them.     1. Identify Early Warning Signs:    Sometimes symptoms return, even when people do their best to stay well. Symptoms can develop over a short period of time with little or no warning, but most of the time they emerge gradually over several weeks.  Early warning signs are changes that people experience when a relapse is starting. Some early warning signs are common and others are not as common.   Common Early Warning Signs:    Feeling tense or nervous, Eating less or eating more, Trouble sleeping -either too much or too little sleep, Feeling depressed or low, Feeling irritable, Feeling like not being around other people and Trouble concentrating     2. Identify action steps to take when warning signs are noticed:    Taking Action- It is important to take action if you are experiencing early warning signs of a relapse.  The faster you act, the more likely it is that you can avoid a full relapse.  It is helpful to identify several specific ways to cope with symptoms.      The following is my list of symptoms and coping strategies that I can use when they are present:    Symptom Coping Strategies   Anxiety -Talk with someone in your support system and let him or her know how you are feeling.  -Use relaxation techniques such as deep breathing or imagery.  -Use positive affirmations to counteract negative self-talk such as  I am learning to let go of worry.    Depression - Schedule your day; include activities you have to do and activities you enjoy doing.  - Get some exercise - walk, run, bike, or swim.  - Give yourself credit for even the smallest things you get done.   Sleep Difficulties   - Go to sleep at the same time every day.  - Do something relaxing before bed, such  as drinking herbal tea or listening to music.  - Avoid having discussions about upsetting topics before going to bed.   Delusions   - Distract yourself from the disturbing thought by doing something that requires your attention such as a puzzle.  - Check out your beliefs by talking to someone you trust.    Hallucinations   - Use headphones to listen to music.  - Tell voices to  stop  or say to yourself,  I am safe.   - Ignore the hallucinations as much as possible; focus on other things.   Concentration Difficulties - Minimize distractions so there is only one thing for you to focus on at a time.    - Ask the person you are having a conversation with to slow down or repeat things you are unsure of.

## 2021-02-02 NOTE — PROGRESS NOTES
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 Wellbutrin will help her have more energy to exercise again.  Patient also given hydroxyzine to help with sleep and anxiety.  Patient reports she is not currently breast-feeding.  Uses nexplanon implant to protect against pregnancy. Could consider prazosin in the future if necessary for her hypervigilance and any nightmares.  She is encouraged to start individual psychotherapy for additional support and development of nonpharmacologic coping skills.    Medication side effects and alternatives reviewed. Health promotion activities recommended and reviewed today. All questions addressed. Education and counseling completed regarding risks and benefits of medications and psychotherapy options. Recommend therapy for additional support.     Treatment Plan:    Start Wellbutrin  mg daily for mood    Discontinued Lexapro (already stopped a couple months ago due to side effects)    Start hydroxyzine 25-50 mg up to three times a day as needed for anxiety/sleep. Can break a tablet in half and take just 12.5 mg if 25 mg too sedating.     Continue with therapy as planned. Referral placed today.    Continue all other medications as reviewed per electronic medical record today.     Safety plan reviewed. To  the Emergency Department as needed or call after hours crisis line at [phone] or [phone]. [place] Crisis Text Line: Text [place] to 794929  or  Suicide LifeLine Chat: suicideYellowBrck.org/chat    Schedule an appointment with me in 4-6 weeks or sooner as needed.  Call [facility] at [phone] to schedule.    Follow up with primary care provider as planned or sooner if needed for acute medical concerns.    Call the psychiatric nurse line with medication questions or concerns at [phone].    idemamahart may be used to communicate with your provider, but this is not intended to be used for emergencies.    Community Resources:    National Suicide Prevention Lifeline: 840.272.4188 (TTY: 639.651.4205). Call anytime for help.  (www.suicidepreventionlifeline.org)  National [place] on [place] Illness (www.eemrald.org): 801.690.3977 or 711-883-7949.   Mental Health Association (www.mentalhealth.org): 549.289.1622 or 271-921-8504.  [place] Crisis Text Line: Text [place] to 629057  Suicide LifeLine Chat: suicideYellowBrck.org/chat    Administrative Billing:   Phone Call/Video Duration: 28 Minutes  Start: 2:39p  Stop: 3:07p    Complexity of Care: Patient with multiple psychiatric diagnoses and multiple medication changes adding to complexity of care.    Patient Status:  Patient will continue to be seen for ongoing consultation and stabilization.    Signed:   Carmen Ferrari DO  [facility]
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
## 2021-02-03 ASSESSMENT — ANXIETY QUESTIONNAIRES: GAD7 TOTAL SCORE: 17

## 2021-02-08 NOTE — PATIENT INSTRUCTIONS
Treatment Plan:    Start Wellbutrin  mg daily for mood    Discontinued Lexapro (already stopped a couple months ago due to side effects)    Start hydroxyzine 25-50 mg up to three times a day as needed for anxiety/sleep. Can break a tablet in half and take just 12.5 mg if 25 mg too sedating.     Continue with therapy as planned. Referral placed today.    Continue all other medications as reviewed per electronic medical record today.     Safety plan reviewed. To the Emergency Department as needed or call after hours crisis line at [phone] or [phone]. [place] Crisis Text [place]: Text [place] to 145049  or  Suicide LifeLine Chat: suicideAwesomeHighlighter.org/chat    Schedule an appointment with me in 4-6 weeks or sooner as needed.  Call [facility] at [phone] to schedule.    Follow up with primary care provider as planned or sooner if needed for acute medical concerns.    Call the psychiatric nurse line with medication questions or concerns at [phone].    RhinoCyte may be used to communicate with your provider, but this is not intended to be used for emergencies.    Community Resources:    National Suicide Prevention Lifeline: 787.789.5490 (TTY: 392.117.9890). Call anytime for help.  (www.suicidepreventionlifeline.org)  National [place] on Mental Illness (www.emerald.org): 457.607.4575 or 583-463-9717.   Mental Health Association (www.mentalhealth.org): 224.546.8761 or 736-136-3009.  [place] Crisis Text [place]: Text [place] to 064634  Suicide LifeLine Chat: suicideAwesomeHighlighter.org/chat

## 2021-03-16 ENCOUNTER — VIRTUAL VISIT (OUTPATIENT)
Dept: PSYCHIATRY | Facility: CLINIC | Age: 21
End: 2021-03-16
Payer: COMMERCIAL

## 2021-03-16 ENCOUNTER — VIRTUAL VISIT (OUTPATIENT)
Dept: PSYCHOLOGY | Facility: CLINIC | Age: 21
End: 2021-03-16
Payer: COMMERCIAL

## 2021-03-16 DIAGNOSIS — F41.1 GAD (GENERALIZED ANXIETY DISORDER): ICD-10-CM

## 2021-03-16 DIAGNOSIS — F43.10 PTSD (POST-TRAUMATIC STRESS DISORDER): Primary | ICD-10-CM

## 2021-03-16 DIAGNOSIS — F33.41 RECURRENT MAJOR DEPRESSIVE DISORDER, IN PARTIAL REMISSION (H): Primary | ICD-10-CM

## 2021-03-16 DIAGNOSIS — F33.1 MODERATE EPISODE OF RECURRENT MAJOR DEPRESSIVE DISORDER (H): ICD-10-CM

## 2021-03-16 DIAGNOSIS — F43.10 PTSD (POST-TRAUMATIC STRESS DISORDER): ICD-10-CM

## 2021-03-16 PROCEDURE — 99213 OFFICE O/P EST LOW 20 MIN: CPT | Mod: 95 | Performed by: PSYCHIATRY & NEUROLOGY

## 2021-03-16 PROCEDURE — 90832 PSYTX W PT 30 MINUTES: CPT | Mod: 95 | Performed by: PSYCHOLOGIST
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
## 2021-03-16 NOTE — Clinical Note
Please note in pt chart this patient's psychiatric care is being discharged from me and returned to their primary care provider. Thanks!

## 2021-03-16 NOTE — Clinical Note
Psychiatry Update/Consult. I am returning Honey's psychiatric care back to you for ongoing care and medication prescribing.  She is currently only taking hydroxyzine as needed.  Future medication refills will come from you. I recommended that the patient follow up with you in about 6-8 weeks or as needed. More details about treatment plan are in my note. If you have any questions or concerns, please let me know. The patient can be re-referred back to this service for further consultation in the future if needed but a new referral will need to be placed.    Thanks for the referral! It was a pleasure working with Honey Ramirez.     - Carmen Ferrari, DO  [facility] Psychiat[facility]
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
## 2021-03-21 NOTE — PATIENT INSTRUCTIONS
Treatment Plan:    Continue hydroxyzine 25-50 mg up to three times a day as needed for anxiety/sleep. Can break a tablet in half and take just 12.5 mg if 25 mg too sedating.     Discontinue Wellbutrin XL since not taking    Continue all other cares per primary care provider.     Continue all other medications as reviewed per electronic medical record today.     Safety plan reviewed. To the Emergency Department as needed or call after hours crisis line at [phone] or [phone]. [place] Crisis Text Line. Text [place] to 295237 or Suicide LifeLine Chat: suicidepreventionlifeline.org/chat    Continue therapy as planned.     Follow up with primary care provider as planned or for acute medical concerns.    ThromboVision may be used to communicate with your provider, but this is not intended to be used for emergencies.    Thank you for our work together in the Psychiatry Collaborative Care Model at [facility]. This is our last visit and I am returning your care back to your Primary Care Provider, Daly Bran NP. If you are not doing well, please contact your Primary Care Provider office.

## 2021-05-28 NOTE — PROGRESS NOTES
"    Assessment & Plan     (R10.12) LUQ abdominal pain  (primary encounter diagnosis)  Comment: Ongoing for over 1 year.  Plan: Comprehensive metabolic panel, Lipase, Amylase,        famotidine (PEPCID) 20 MG tablet        Patient to scheduled follow-up in 4 weeks. If no improvement in symptoms, will place referral to GI.    (S93.673E) Sprain of left ankle, unspecified ligament, initial encounter  Comment: New, started over 1 week ago  Plan: Ace wrap applied to left ankle. Instructed patient to rest as much as she can, elevate, ice, and take ibuprofen as needed. If no improvement in 2 weeks, will consider imaging.     (J45.829) Moderate asthma without complication, unspecified whether persistent  Comment: Chronic, ongoing  Plan: albuterol (PROAIR HFA/PROVENTIL HFA/VENTOLIN         HFA) 108 (90 Base) MCG/ACT inhaler        Refilled x 2 months  15730}     BMI:   Estimated body mass index is 43.03 kg/m  as calculated from the following:    Height as of this encounter: 1.613 m (5' 3.5\").    Weight as of this encounter: 111.9 kg (246 lb 12.8 oz).     Return in about 4 weeks (around 6/30/2021) for LUQ abdominal pain. .    CASH Day [facility]   Honey is a 21 year old who presents for the following health issues     HPI     Abdominal/Flank Pain  Onset/Duration: over a year   Description:   Character: Sharp  Location: left upper quadrant  Radiation: None and Back  Intensity: 7/10  Progression of Symptoms:  same and intermittent  Accompanying Signs & Symptoms:  Fever/Chills: no  Gas/Bloating: no  Nausea: YES, when she has been eaten recently.    Vomitting: no  Diarrhea: some  Constipation: no  Dysuria or Hematuria: no  History:   Trauma: no  Previous similar pain: no  Previous tests done: none  Precipitating factors:   Does the pain change with:     Food: no    Bowel Movement: no    Urination: no   Other factors:  Usually happens if standing for a long time  Therapies " tried and outcome: None  No LMP recorded. (Menstrual status: Postpartum).     Abdominal pain - started when pregnant over a year ago. Pain is located on left upper abdomen and radiates to left upper back. Reports pain is intermittent. Long periods of standing bring on the pain and resting or laying down completely relieves the pain. Laying on her right side also alleviates the pain. Pain is a 7-8/10 at its worse with standing. Pain is described as sharp and stabbing and is associated with shortness of breath.  Reports she has acid reflux and takes Tums 1x week on average with relief.  LUQ pain is present more when she has not eaten for an extended period of time.        Musculoskeletal problem/pain  Onset/Duration: over 1 week  Description  Location: ankle - left  Joint Swelling: YES  Redness: YES  Pain: with pressure walking  Warmth: no  Intensity:  8/10  Progression of Symptoms:  worsening and constant  Accompanying signs and symptoms:   Fevers: no  Numbness/tingling/weakness: YES, weakness, tingling  History  Trauma to the area: no  Recent illness:  no  Previous similar problem: no  Previous evaluation:  no  Precipitating or alleviating factors:  Aggravating factors include: standing and walking  Therapies tried and outcome: brace and icyhot    Left Ankle pain - started over 1 week ago. Denies any recent injury or trauma. Pain starts in left later ankle with ambulation and weight bearing. She reports there was redness and slight swelling x 4 days after onset. Pain is described as sharp with ambulation. Now has resolved. Ankle does not hurt at rest or with elevation. Pain radiates to left lateral side of foot and up her leg. She has tried using icy/hot with no improvement in pain. Denies any numbness or tingling of left foot.  She has tried wearing a brace but feels this is too tight and does not help with pain.   Reports she does find herself putting pressure on her left leg and hip with standing, carries her  "baby on her right hip.      Asthma-- reports she was diagnosed with asthma as a child.  Unsure if she has ever seen a specialist for this.  Reports shortness of breath with activity.  Rarely wheezing.  Reports last inhaler prescription was 2 years ago.          Review of Systems   CONSTITUTIONAL: NEGATIVE for fever, chills, change in weight  RESP: NEGATIVE for significant cough or SOB  CV: NEGATIVE for chest pain, palpitations or peripheral edema  GI: NEGATIVE for changes in bowel patterns. POSITIVE for abdominal pain.  MUSCULOSKELETAL: NEGATIVE for significant arthralgias or myalgia  NEURO: NEGATIVE for weakness, dizziness or paresthesias      Objective    /89   Pulse 68   Temp 98.7  F (37.1  C) (Oral)   Ht 1.613 m (5' 3.5\")   Wt 111.9 kg (246 lb 12.8 oz)   SpO2 99%   BMI 43.03 kg/m    Body mass index is 43.03 kg/m .  Physical Exam   GENERAL: healthy, alert and no distress  EYES: Eyes grossly normal to inspection  RESP: lungs clear to auscultation - no rales, rhonchi or wheezes  CV: regular rate and rhythm, normal S1 S2, no S3 or S4, no murmur, click or rub, no peripheral edema and peripheral pulses strong  ABDOMEN: soft, nontender, no hepatosplenomegaly, no masses and bowel sounds normal  MS: no gross musculoskeletal defects noted, no edema. Left ankle: Tenderness with ROM of all fields however good range of motion present, no edema or erythema.  SKIN: no suspicious lesions or rashes  NEURO: Normal strength and tone, mentation intact and speech normal  PSYCH: mentation appears normal, affect normal/bright        "

## 2021-06-02 ENCOUNTER — OFFICE VISIT (OUTPATIENT)
Dept: FAMILY MEDICINE | Facility: CLINIC | Age: 21
End: 2021-06-02
Payer: COMMERCIAL

## 2021-06-02 VITALS
DIASTOLIC BLOOD PRESSURE: 89 MMHG | WEIGHT: 246.8 LBS | OXYGEN SATURATION: 99 % | HEART RATE: 68 BPM | BODY MASS INDEX: 42.14 KG/M2 | SYSTOLIC BLOOD PRESSURE: 132 MMHG | TEMPERATURE: 98.7 F | HEIGHT: 64 IN

## 2021-06-02 DIAGNOSIS — S93.402A SPRAIN OF LEFT ANKLE, UNSPECIFIED LIGAMENT, INITIAL ENCOUNTER: ICD-10-CM

## 2021-06-02 DIAGNOSIS — J45.909 MODERATE ASTHMA WITHOUT COMPLICATION, UNSPECIFIED WHETHER PERSISTENT: ICD-10-CM

## 2021-06-02 DIAGNOSIS — R10.12 LUQ ABDOMINAL PAIN: Primary | ICD-10-CM

## 2021-06-02 LAB
ALBUMIN SERPL-MCNC: 3.7 G/DL (ref 3.4–5)
ALP SERPL-CCNC: 83 U/L (ref 40–150)
ALT SERPL W P-5'-P-CCNC: 20 U/L (ref 0–50)
AMYLASE SERPL-CCNC: 42 U/L (ref 30–110)
ANION GAP SERPL CALCULATED.3IONS-SCNC: 5 MMOL/L (ref 3–14)
AST SERPL W P-5'-P-CCNC: 13 U/L (ref 0–45)
BILIRUB SERPL-MCNC: 0.6 MG/DL (ref 0.2–1.3)
BUN SERPL-MCNC: 8 MG/DL (ref 7–30)
CALCIUM SERPL-MCNC: 9 MG/DL (ref 8.5–10.1)
CHLORIDE SERPL-SCNC: 107 MMOL/L (ref 94–109)
CO2 SERPL-SCNC: 25 MMOL/L (ref 20–32)
CREAT SERPL-MCNC: 0.87 MG/DL (ref 0.52–1.04)
GFR SERPL CREATININE-BSD FRML MDRD: >90 ML/MIN/{1.73_M2}
GLUCOSE SERPL-MCNC: 114 MG/DL (ref 70–99)
LIPASE SERPL-CCNC: 114 U/L (ref 73–393)
POTASSIUM SERPL-SCNC: 4.3 MMOL/L (ref 3.4–5.3)
PROT SERPL-MCNC: 8 G/DL (ref 6.8–8.8)
SODIUM SERPL-SCNC: 137 MMOL/L (ref 133–144)

## 2021-06-02 PROCEDURE — 83690 ASSAY OF LIPASE: CPT | Performed by: NURSE PRACTITIONER

## 2021-06-02 PROCEDURE — 80053 COMPREHEN METABOLIC PANEL: CPT | Performed by: NURSE PRACTITIONER

## 2021-06-02 PROCEDURE — 82150 ASSAY OF AMYLASE: CPT | Performed by: NURSE PRACTITIONER

## 2021-06-02 PROCEDURE — 36415 COLL VENOUS BLD VENIPUNCTURE: CPT | Performed by: NURSE PRACTITIONER

## 2021-06-02 PROCEDURE — 99214 OFFICE O/P EST MOD 30 MIN: CPT | Performed by: NURSE PRACTITIONER

## 2021-06-02 RX ORDER — FAMOTIDINE 20 MG/1
20 TABLET, FILM COATED ORAL 2 TIMES DAILY
Qty: 60 TABLET | Refills: 1 | Status: SHIPPED | OUTPATIENT
Start: 2021-06-02 | End: 2021-10-01

## 2021-06-02 RX ORDER — ALBUTEROL SULFATE 90 UG/1
2 AEROSOL, METERED RESPIRATORY (INHALATION) EVERY 6 HOURS PRN
Qty: 18 G | Refills: 1 | Status: SHIPPED | OUTPATIENT
Start: 2021-06-02 | End: 2021-11-24

## 2021-06-02 ASSESSMENT — MIFFLIN-ST. JEOR: SCORE: 1856.54

## 2021-06-02 NOTE — LETTER
My Asthma Action Plan    Name: Honey Ramirez   YOB: 2000  Date: 6/2/2021   My doctor: CASH Day CNP   My clinic: [facility]        My Rescue Medicine:   Albuterol inhaler (Proair/Ventolin/Proventil HFA)  2-4 puffs EVERY 4 HOURS as needed. Use a spacer if recommended by your provider.   My Asthma Severity:   Intermittent / Exercise Induced  Know your asthma triggers: exercise or sports             GREEN ZONE   Good Control    I feel good    No cough or wheeze    Can work, sleep and play without asthma symptoms       Take your asthma control medicine every day.     1. If exercise triggers your asthma, take your rescue medication    15 minutes before exercise or sports, and    During exercise if you have asthma symptoms  2. Spacer to use with inhaler: If you have a spacer, make sure to use it with your inhaler             YELLOW ZONE Getting Worse  I have ANY of these:    I do not feel good    Cough or wheeze    Chest feels tight    Wake up at night   1. Keep taking your Green Zone medications  2. Start taking your rescue medicine:    every 20 minutes for up to 1 hour. Then every 4 hours for 24-48 hours.  3. If you stay in the Yellow Zone for more than 12-24 hours, contact your doctor.  4. If you do not return to the Green Zone in 12-24 hours or you get worse, start taking your oral steroid medicine if prescribed by your provider.           RED ZONE Medical Alert - Get Help  I have ANY of these:    I feel awful    Medicine is not helping    Breathing getting harder    Trouble walking or talking    Nose opens wide to breathe       1. Take your rescue medicine NOW  2. If your provider has prescribed an oral steroid medicine, start taking it NOW  3. Call your doctor NOW  4. If you are still in the Red Zone after 20 minutes and you have not reached your doctor:    Take your rescue medicine again and    Call 911 or go to the emergency room right away    See your  regular doctor within 2 weeks of an Emergency Room or Urgent Care visit for follow-up treatment.          Annual Reminders:  Meet with Asthma Educator,  Flu Shot in the Fall, consider Pneumonia Vaccination for patients with asthma (aged 19 and older).    Pharmacy:    [facility] [address] [place], [place] - [address] [place], [place] - [address] [address], [address]    Electronically signed by CASH Day CNP   Date: 06/02/21                    Asthma Triggers  How To Control Things That Make Your Asthma Worse    Triggers are things that make your asthma worse.  Look at the list below to help you find your triggers and   what you can do about them. You can help prevent asthma flare-ups by staying away from your triggers.      Trigger                                                          What you can do   Cigarette Smoke  Tobacco smoke can make asthma worse. Do not allow smoking in your home, car or around you.  Be sure no one smokes at a child s day care or school.  If you smoke, ask your health care provider for ways to help you quit.  Ask family members to quit too.  Ask your health care provider for a referral to Quit Plan to help you quit smoking, or call 8-999-573-PLAN.     Colds, Flu, Bronchitis  These are common triggers of asthma. Wash your hands often.  Don t touch your eyes, nose or mouth.  Get a flu shot every year.     Dust Mites  These are tiny bugs that live in cloth or carpet. They are too small to see. Wash sheets and blankets in hot water every week.   Encase pillows and mattress in dust mite proof covers.  Avoid having carpet if you can. If you have carpet, vacuum weekly.   Use a dust mask and HEPA vacuum.   Pollen and Outdoor Mold  Some people are allergic to trees, grass, or weed pollen, or molds. Try to keep your windows closed.  Limit time out doors when pollen count is high.   Ask you health care provider about taking medicine during allergy season.      Animal Dander  Some people are allergic to skin flakes, urine or saliva from pets with fur or feathers. Keep pets with fur or feathers out of your home.    If you can t keep the pet outdoors, then keep the pet out of your bedroom.  Keep the bedroom door closed.  Keep pets off cloth furniture and away from stuffed toys.     Mice, Rats, and Cockroaches  Some people are allergic to the waste from these pests.   Cover food and garbage.  Clean up spills and food crumbs.  Store grease in the refrigerator.   Keep food out of the bedroom.   Indoor Mold  This can be a trigger if your home has high moisture. Fix leaking faucets, pipes, or other sources of water.   Clean moldy surfaces.  Dehumidify basement if it is damp and smelly.   Smoke, Strong Odors, and Sprays  These can reduce air quality. Stay away from strong odors and sprays, such as perfume, powder, hair spray, paints, smoke incense, paint, cleaning products, candles and new carpet.   Exercise or Sports  Some people with asthma have this trigger. Be active!  Ask your doctor about taking medicine before sports or exercise to prevent symptoms.    Warm up for 5-10 minutes before and after sports or exercise.     Other Triggers of Asthma  Cold air:  Cover your nose and mouth with a scarf.  Sometimes laughing or crying can be a trigger.  Some medicines and food can trigger asthma.

## 2021-06-02 NOTE — LETTER
My Depression Action Plan  Name: Honey Ramirez   Date of Birth 2000  Date: 5/28/2021    My doctor: Skylar Moore   My clinic: [facility]  [address] [facility] [address] [phone]          GREEN    ZONE   Good Control    What it looks like:     Things are going generally well. You have normal ups and downs. You may even feel depressed from time to time, but bad moods usually last less than a day.   What you need to do:  1. Continue to care for yourself (see self care plan)  2. Check your depression survival kit and update it as needed  3. Follow your physician s recommendations including any medication.  4. Do not stop taking medication unless you consult with your physician first.           YELLOW         ZONE Getting Worse    What it looks like:     Depression is starting to interfere with your life.     It may be hard to get out of bed; you may be starting to isolate yourself from others.    Symptoms of depression are starting to last most all day and this has happened for several days.     You may have suicidal thoughts but they are not constant.   What you need to do:     1. Call your care team. Your response to treatment will improve if you keep your care team informed of your progress. Yellow periods are signs an adjustment may need to be made.     2. Continue your self-care.  Just get dressed and ready for the day.  Don't give yourself time to talk yourself out of it.    3. Talk to someone in your support network.    4. Open up your Depression Self-Care Plan/Wellness Kit.           RED    ZONE Medical Alert - Get Help    What it looks like:     Depression is seriously interfering with your life.     You may experience these or other symptoms: You can t get out of bed most days, can t work or engage in other necessary activities, you have trouble taking care of basic hygiene, or basic responsibilities, thoughts of suicide or death that  will not go away, self-injurious behavior.     What you need to do:  1. Call your care team and request a same-day appointment. If they are not available (weekends or after hours) call your local crisis line, emergency room or 911.          Depression Self-Care Plan / Wellness Kit    Many people find that medication and therapy are helpful treatments for managing depression. In addition, making small changes to your everyday life can help to boost your mood and improve your wellbeing. Below are some tips for you to consider. Be sure to talk with your medical provider and/or behavioral health consultant if your symptoms are worsening or not improving.     Sleep   Sleep hygiene  means all of the habits that support good, restful sleep. It includes maintaining a consistent bedtime and wake time, using your bedroom only for sleeping or sex, and keeping the bedroom dark and free of distractions like a computer, smartphone, or television.     Develop a Healthy Routine  Maintain good hygiene. Get out of bed in the morning, make your bed, brush your teeth, take a shower, and get dressed. Don t spend too much time viewing media that makes you feel stressed. Find time to relax each day.    Exercise  Get some form of exercise every day. This will help reduce pain and release endorphins, the  feel good  chemicals in your brain. It can be as simple as just going for a walk or doing some gardening, anything that will get you moving.      Diet  Strive to eat healthy foods, including fruits and vegetables. Drink plenty of water. Avoid excessive sugar, caffeine, alcohol, and other mood-altering substances.     Stay Connected with Others  Stay in touch with friends and family members.    Manage Your Mood  Try deep breathing, massage therapy, biofeedback, or meditation. Take part in fun activities when you can. Try to find something to smile about each day.     Psychotherapy  Be open to working with a therapist if your provider  recommends it.     Medication  Be sure to take your medication as prescribed. Most anti-depressants need to be taken every day. It usually takes several weeks for medications to work. Not all medicines work for all people. It is important to follow-up with your provider to make sure you have a treatment plan that is working for you. Do not stop your medication abruptly without first discussing it with your provider.    Crisis Resources   These hotlines are for both adults and children. They and are open 24 hours a day, 7 days a week unless noted otherwise.      National Suicide Prevention Lifeline   5-533-909-HJVG (3910)      Crisis Text Line    www.crisistextline.org  Text HOME to 714266 from anywhere in the [place], anytime, about any type of crisis. A live, trained crisis counselor will receive the text and respond quickly.      [place] Lifeline for LGBTQ Youth  A national crisis intervention and suicide lifeline for LGBTQ youth under 25. Provides a safe place to talk without judgement. Call 1-409.401.3238; text START to 382255 or visit www.thetrevorproject.org to talk to a trained counselor.      For [facility] crisis numbers, visit the [facility] website at:  https://[place].gov/dhs/people-we-serve/adults/health-care/mental-health/resources/crisis-contacts.jsp

## 2021-06-02 NOTE — PATIENT INSTRUCTIONS
Rest when you can. Elevate left ankle when sitting.  Apply ice packs to left ankle and foot for 15-20 minutes 3-4 times per day.  Take Ibuprofen 600 mg every 6 hours as needed for pain.            Patient Education     Treating Ankle Sprains  Treatment will depend on how bad your sprain is. For a severe sprain, healing may take 3 months or more.  Right after your injury: Use R.I.C.E.    BIG: Rest: At first, keep weight off the ankle as much as you can. You may be given crutches to help you walk without putting weight on the ankle.    BIG: Ice: Put an ice pack on the ankle for 20 minutes. Remove the pack and wait at least 30 minutes. Repeat for up to 3 days. This helps reduce swelling.    BIG: Compression: To reduce swelling and keep the joint stable, you may need to wrap the ankle with an elastic bandage. For more severe sprains, you may need an ankle brace, a boot, or a cast.    BIG: Elevation: To reduce swelling, keep your ankle raised above your heart when you sit or lie down.  Medicine  Your healthcare provider may suggest oral nonsteroidal anti-inflammatory medicine (NSAIDs), such as ibuprofen. This relieves the pain and helps reduce swelling. Be sure to take your medicine as directed.  Exercises    After about 2 to 3 weeks, you may be given exercises to strengthen the ligaments and muscles in the ankle. Doing these exercises will help prevent another ankle sprain. Exercises may include standing on your toes and then on your heels and doing ankle curls.    Sit on the edge of a sturdy table or lie on your back.    Pull your toes toward you. Then point them away from you. Repeat for 2 to 3 minutes.  [employer] last reviewed this educational content on 1/1/2018 2000-2021 [employer]. All rights reserved. This information is not intended as a substitute for professional medical care. Always follow your healthcare professional's instructions.

## 2021-06-03 ASSESSMENT — ASTHMA QUESTIONNAIRES: ACT_TOTALSCORE: 15

## 2021-08-03 ENCOUNTER — APPOINTMENT (OUTPATIENT)
Dept: URGENT CARE | Facility: CLINIC | Age: 21
End: 2021-08-03
Payer: COMMERCIAL

## 2021-08-04 ENCOUNTER — VIRTUAL VISIT (OUTPATIENT)
Dept: FAMILY MEDICINE | Facility: CLINIC | Age: 21
End: 2021-08-04
Payer: COMMERCIAL

## 2021-08-04 DIAGNOSIS — G43.009 MIGRAINE WITHOUT AURA AND WITHOUT STATUS MIGRAINOSUS, NOT INTRACTABLE: Primary | ICD-10-CM

## 2021-08-04 PROCEDURE — 99213 OFFICE O/P EST LOW 20 MIN: CPT | Mod: 95 | Performed by: NURSE PRACTITIONER

## 2021-08-04 RX ORDER — ONDANSETRON 8 MG/1
8 TABLET, FILM COATED ORAL EVERY 8 HOURS PRN
Qty: 30 TABLET | Refills: 0 | Status: SHIPPED | OUTPATIENT
Start: 2021-08-04 | End: 2021-11-24

## 2021-08-04 RX ORDER — SUMATRIPTAN 50 MG/1
50 TABLET, FILM COATED ORAL
Qty: 12 TABLET | Refills: 3 | Status: SHIPPED | OUTPATIENT
Start: 2021-08-04 | End: 2021-11-24

## 2021-08-04 ASSESSMENT — ENCOUNTER SYMPTOMS
HEADACHES: 1
CHILLS: 0
NAUSEA: 1
ABDOMINAL PAIN: 1
VOMITING: 1
PHOTOPHOBIA: 1
FEVER: 0

## 2021-08-04 NOTE — PROGRESS NOTES
Honey is a 21 year old who is being evaluated via a billable video visit.      How would you like to obtain your AVS? MyChart  If the video visit is dropped, the invitation should be resent by: Text to cell phone: [phone]  Will anyone else be joining your video visit? No    Video Start Time: 11:26 AM    Assessment & Plan     1. Migraine without aura and without status migrainosus, not intractable  -  Continue to limit OTC medication use to 2 x per week or less.  Recommend getting a good supportive pillow for neck pain/tension.  May also try ice or heating pads to neck.   - SUMAtriptan (IMITREX) 50 MG tablet; Take 1 tablet (50 mg) by mouth at onset of headache for migraine May repeat in 2 hours. Max 4 tablets/24 hours.  Dispense: 12 tablet; Refill: 3  - ondansetron (ZOFRAN) 8 MG tablet; Take 1 tablet (8 mg) by mouth every 8 hours as needed for nausea  Dispense: 30 tablet; Refill: 0      Return in about 2 weeks (around 8/18/2021) for worsening symptoms or failure to improve.    CASH Day CNP  [facility]   Honey is a 21 year old who presents for the following health issues     HPI     Headache  Onset/Duration: 7 years hurts more lately  Description  Location: front of head    Character: throbbing pain, sharp pain  Frequency:  4 times a week   Duration:  Usually the whole day   Wake with headaches: YES  Able to do daily activities when headache present: no   Intensity:  mild  Progression of Symptoms:  worsening  Accompanying signs and symptoms:  Stiff neck: YES  Neck or upper back pain: YES  Sinus or URI symptoms YES- URI, hard to breath sometimes  Fever: no  Nausea or vomiting: YES- from the migraine  Dizziness: YES  Numbness/tingling: no  Weakness: no  Visual changes: when she gets dizzy she shuts her eyes because she cant see anything   History  Head trauma: YES- 7 years ago  Family history of migraines: no  Daily pain medication use: no, excedrin migraine use  most days though  Previous tests for headaches: no  Neurologist evaluation: no  Precipitating or Alleviating factors (light/sound/sleep/caffeine): sound/light makes it worse.   Therapies tried and outcome: caffeine  and Excedrin              Outcome - usually effective  Frequent/daily pain medication use: no    Reports she is only taking Excedrin once per week on average.  She does not take Tylenol or Ibuprofen as she states these do not help with symptoms.  Reports associated tension in her neck.  Reports headaches a few times per week and migraines a few times per month on average.  + Associated light and sound sensitivity in addition to nausea with occasional vomiting.         Review of Systems   Constitutional: Negative for chills and fever.   Eyes: Positive for photophobia. Negative for visual disturbance.   Gastrointestinal: Positive for abdominal pain, nausea and vomiting.   Neurological: Positive for headaches.            Objective           Vitals:  No vitals were obtained today due to virtual visit.    Physical Exam   GENERAL: Healthy, alert and no distress  EYES: Eyes grossly normal to inspection.  No discharge or erythema, or obvious scleral/conjunctival abnormalities.  RESP: No audible wheeze, cough, or visible cyanosis.  No visible retractions or increased work of breathing.    SKIN: Visible skin clear. No significant rash, abnormal pigmentation or lesions.  NEURO: Cranial nerves grossly intact.  Mentation and speech appropriate for age.  PSYCH: Mentation appears normal, affect normal/bright, judgement and insight intact, normal speech and appearance well-groomed.            Video-Visit Details    Type of service:  Video Visit    Video End Time:11:36    Originating Location (pt. Location): Home    Distant Location (provider location):  [facility]     Platform used for Video Visit: [employer]

## 2021-08-04 NOTE — LETTER
August 4, 2021      Honey Ramirez  [address] [facility] [address]        To Whom It May Concern:    Honey Ramirez  was seen on 8/4/2021.  She suffers from migraine headaches and may need to take medication for this on an as needed basis.       Sincerely,        CASH Day CNP

## 2021-09-05 ENCOUNTER — HEALTH MAINTENANCE LETTER (OUTPATIENT)
Age: 21
End: 2021-09-05

## 2021-10-28 ENCOUNTER — OFFICE VISIT (OUTPATIENT)
Dept: URGENT CARE | Facility: URGENT CARE | Age: 21
End: 2021-10-28
Payer: COMMERCIAL

## 2021-10-28 ENCOUNTER — OFFICE VISIT (OUTPATIENT)
Dept: PEDIATRICS | Facility: CLINIC | Age: 21
End: 2021-10-28
Payer: COMMERCIAL

## 2021-10-28 VITALS
SYSTOLIC BLOOD PRESSURE: 154 MMHG | DIASTOLIC BLOOD PRESSURE: 91 MMHG | OXYGEN SATURATION: 98 % | RESPIRATION RATE: 18 BRPM | TEMPERATURE: 99 F | HEART RATE: 79 BPM

## 2021-10-28 VITALS
SYSTOLIC BLOOD PRESSURE: 145 MMHG | OXYGEN SATURATION: 100 % | HEART RATE: 79 BPM | WEIGHT: 246.8 LBS | BODY MASS INDEX: 43.03 KG/M2 | DIASTOLIC BLOOD PRESSURE: 101 MMHG | TEMPERATURE: 97.8 F

## 2021-10-28 DIAGNOSIS — R10.31 ABDOMINAL PAIN, RIGHT LOWER QUADRANT: Primary | ICD-10-CM

## 2021-10-28 DIAGNOSIS — R10.31 RLQ ABDOMINAL PAIN: Primary | ICD-10-CM

## 2021-10-28 LAB
ALBUMIN SERPL-MCNC: 3.8 G/DL (ref 3.4–5)
ALBUMIN UR-MCNC: NEGATIVE MG/DL
ALP SERPL-CCNC: 103 U/L (ref 40–150)
ALT SERPL W P-5'-P-CCNC: 27 U/L (ref 0–50)
ANION GAP SERPL CALCULATED.3IONS-SCNC: 3 MMOL/L (ref 3–14)
APPEARANCE UR: CLEAR
AST SERPL W P-5'-P-CCNC: 11 U/L (ref 0–45)
BASOPHILS # BLD AUTO: 0.1 10E3/UL (ref 0–0.2)
BASOPHILS NFR BLD AUTO: 1 %
BILIRUB SERPL-MCNC: 0.2 MG/DL (ref 0.2–1.3)
BILIRUB UR QL STRIP: NEGATIVE
BUN SERPL-MCNC: 11 MG/DL (ref 7–30)
CALCIUM SERPL-MCNC: 9.6 MG/DL (ref 8.5–10.1)
CHLORIDE BLD-SCNC: 108 MMOL/L (ref 94–109)
CO2 SERPL-SCNC: 27 MMOL/L (ref 20–32)
COLOR UR AUTO: YELLOW
CREAT SERPL-MCNC: 0.76 MG/DL (ref 0.52–1.04)
EOSINOPHIL # BLD AUTO: 0.1 10E3/UL (ref 0–0.7)
EOSINOPHIL NFR BLD AUTO: 1 %
ERYTHROCYTE [DISTWIDTH] IN BLOOD BY AUTOMATED COUNT: 12.5 % (ref 10–15)
GFR SERPL CREATININE-BSD FRML MDRD: >90 ML/MIN/1.73M2
GLUCOSE BLD-MCNC: 99 MG/DL (ref 70–99)
GLUCOSE UR STRIP-MCNC: NEGATIVE MG/DL
HCG UR QL: NEGATIVE
HCT VFR BLD AUTO: 40.2 % (ref 35–47)
HGB BLD-MCNC: 13 G/DL (ref 11.7–15.7)
HGB UR QL STRIP: NEGATIVE
IMM GRANULOCYTES # BLD: 0 10E3/UL
IMM GRANULOCYTES NFR BLD: 1 %
KETONES UR STRIP-MCNC: NEGATIVE MG/DL
LEUKOCYTE ESTERASE UR QL STRIP: NEGATIVE
LIPASE SERPL-CCNC: 104 U/L (ref 73–393)
LYMPHOCYTES # BLD AUTO: 2.4 10E3/UL (ref 0.8–5.3)
LYMPHOCYTES NFR BLD AUTO: 27 %
MCH RBC QN AUTO: 28.6 PG (ref 26.5–33)
MCHC RBC AUTO-ENTMCNC: 32.3 G/DL (ref 31.5–36.5)
MCV RBC AUTO: 89 FL (ref 78–100)
MONOCYTES # BLD AUTO: 0.7 10E3/UL (ref 0–1.3)
MONOCYTES NFR BLD AUTO: 7 %
NEUTROPHILS # BLD AUTO: 5.6 10E3/UL (ref 1.6–8.3)
NEUTROPHILS NFR BLD AUTO: 63 %
NITRATE UR QL: NEGATIVE
NRBC # BLD AUTO: 0 10E3/UL
NRBC BLD AUTO-RTO: 0 /100
PH UR STRIP: 6.5 [PH] (ref 5–7)
PLATELET # BLD AUTO: 302 10E3/UL (ref 150–450)
POTASSIUM BLD-SCNC: 3.9 MMOL/L (ref 3.4–5.3)
PROT SERPL-MCNC: 8.2 G/DL (ref 6.8–8.8)
RBC # BLD AUTO: 4.54 10E6/UL (ref 3.8–5.2)
SODIUM SERPL-SCNC: 138 MMOL/L (ref 133–144)
SP GR UR STRIP: 1.02 (ref 1–1.03)
UROBILINOGEN UR STRIP-ACNC: 0.2 E.U./DL
WBC # BLD AUTO: 8.8 10E3/UL (ref 4–11)

## 2021-10-28 PROCEDURE — 83690 ASSAY OF LIPASE: CPT | Performed by: FAMILY MEDICINE

## 2021-10-28 PROCEDURE — 81003 URINALYSIS AUTO W/O SCOPE: CPT | Performed by: FAMILY MEDICINE

## 2021-10-28 PROCEDURE — 36415 COLL VENOUS BLD VENIPUNCTURE: CPT | Performed by: FAMILY MEDICINE

## 2021-10-28 PROCEDURE — 80053 COMPREHEN METABOLIC PANEL: CPT | Performed by: FAMILY MEDICINE

## 2021-10-28 PROCEDURE — 85025 COMPLETE CBC W/AUTO DIFF WBC: CPT | Performed by: FAMILY MEDICINE

## 2021-10-28 PROCEDURE — 99214 OFFICE O/P EST MOD 30 MIN: CPT | Performed by: FAMILY MEDICINE

## 2021-10-28 PROCEDURE — 99207 REFERRAL TO ACUTE AND DIAGNOSTIC SERVICES: CPT | Performed by: FAMILY MEDICINE

## 2021-10-28 PROCEDURE — 81025 URINE PREGNANCY TEST: CPT | Performed by: FAMILY MEDICINE
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
## 2021-10-28 NOTE — PROGRESS NOTES
Chief complaint: abdominal pain    right lower quadrant   Nauseous thismorning   Had pain started today   Patient declined possible pregnancy  location: right lower quadrant   Severity: spreading up to the right upper part of the abdominal area   description: shartp  aggravating symptoms: worse with sitting and moving forward   relieving symptoms: none   nausea and vomiting: nausea but no vomiting   diarrhea: no  treatments tried: no   urinary symptoms:  none   flank pain:  none  fever or chills:  none  melena, hematochezia, or hematemesis: none  Problem list, Medication list, Allergies, and Medical/Social/Surgical histories reviewed in [facility] and updated as appropriate.      ROS:  Constitutional, HEENT, cardiovascular, pulmonary, GI, , musculoskeletal, neuro, skin, endocrine and psych systems are negative, except as otherwise noted.    OBJECTIVE:  BP (!) 145/101   Pulse 79   Temp 97.8  F (36.6  C) (Tympanic)   Wt 111.9 kg (246 lb 12.8 oz)   LMP 10/28/2020 (Approximate)   SpO2 100%   BMI 43.03 kg/m     General:   awake, alert, and cooperative.  NAD.   Head: Normocephalic, atraumatic.  Eyes: Conjunctiva clear, non icteric. SHARYN  Heart: Regular rate and rhythm. No murmur.  Lungs: Chest is clear; no wheezes or rales.  ABD: soft, positive right lower quadrant  tenderness to palpation , no rigidity, guarding or rebound , bowel sounds intact  RECTAL: declined/deferred   Neuro: Alert and oriented - normal speech.       Diagnostic Test Results:  Results for orders placed or performed in visit on 10/28/21 (from the past 24 hour(s))   HCG Qual, Urine ([id number])   Result Value Ref Range    hCG Urine Qualitative Negative Negative   UA Macro with Reflex to Micro and Culture - lab collect    Specimen: Urine, Midstream   Result Value Ref Range    Color Urine Yellow Colorless, Straw, Light Yellow, Yellow    Appearance Urine Clear Clear    Glucose Urine Negative Negative mg/dL    Bilirubin Urine Negative Negative    Ketones  Urine Negative Negative mg/dL    Specific Gravity Urine 1.020 1.003 - 1.035    Blood Urine Negative Negative    pH Urine 6.5 5.0 - 7.0    Protein Albumin Urine Negative Negative mg/dL    Urobilinogen Urine 0.2 0.2, 1.0 E.U./dL    Nitrite Urine Negative Negative    Leukocyte Esterase Urine Negative Negative    Narrative    Microscopic not indicated     ASSESSMENT:well appearing  No diagnosis found.    ICD-10-CM    1. Abdominal pain, right lower quadrant  R10.31 UA Macro with Reflex to Micro and Culture - lab collect     HCG Qual, Urine ([id number])     HCG Qual, Urine ([id number])     UA Macro with Reflex to Micro and Culture - lab collect     Referral to Acute and Diagnostic Services (Day of diagnostic / First order acute)         PLAN:      Referred to [facility] for further evaluation and management rule out acute medical/surgical abdomen     Yvonne Escamilla MD

## 2021-10-28 NOTE — PATIENT INSTRUCTIONS
If you develop new or worsening symptoms please seek medical attention right away      If the pain continues to be bothering you from time to time I recommend you see your doctor  -- they will consider imaging or doing a referral

## 2021-10-31 ENCOUNTER — HEALTH MAINTENANCE LETTER (OUTPATIENT)
Age: 21
End: 2021-10-31

## 2021-11-24 ENCOUNTER — OFFICE VISIT (OUTPATIENT)
Dept: OBGYN | Facility: CLINIC | Age: 21
End: 2021-11-24
Payer: COMMERCIAL

## 2021-11-24 ENCOUNTER — MYC MEDICAL ADVICE (OUTPATIENT)
Dept: OBGYN | Facility: CLINIC | Age: 21
End: 2021-11-24

## 2021-11-24 VITALS
HEART RATE: 63 BPM | SYSTOLIC BLOOD PRESSURE: 139 MMHG | HEIGHT: 64 IN | DIASTOLIC BLOOD PRESSURE: 91 MMHG | WEIGHT: 246.4 LBS | OXYGEN SATURATION: 96 % | BODY MASS INDEX: 42.07 KG/M2

## 2021-11-24 DIAGNOSIS — R10.2 PELVIC PAIN IN FEMALE: ICD-10-CM

## 2021-11-24 DIAGNOSIS — Z11.59 NEED FOR HEPATITIS C SCREENING TEST: ICD-10-CM

## 2021-11-24 DIAGNOSIS — F33.1 MODERATE EPISODE OF RECURRENT MAJOR DEPRESSIVE DISORDER (H): ICD-10-CM

## 2021-11-24 DIAGNOSIS — Z01.419 ENCOUNTER FOR GYNECOLOGICAL EXAMINATION WITHOUT ABNORMAL FINDING: Primary | ICD-10-CM

## 2021-11-24 DIAGNOSIS — Z11.3 SCREENING FOR STDS (SEXUALLY TRANSMITTED DISEASES): ICD-10-CM

## 2021-11-24 PROCEDURE — 86803 HEPATITIS C AB TEST: CPT | Performed by: NURSE PRACTITIONER

## 2021-11-24 PROCEDURE — 36415 COLL VENOUS BLD VENIPUNCTURE: CPT | Performed by: NURSE PRACTITIONER

## 2021-11-24 PROCEDURE — 87491 CHLMYD TRACH DNA AMP PROBE: CPT | Performed by: NURSE PRACTITIONER

## 2021-11-24 PROCEDURE — G0145 SCR C/V CYTO,THINLAYER,RESCR: HCPCS | Performed by: NURSE PRACTITIONER

## 2021-11-24 PROCEDURE — 99395 PREV VISIT EST AGE 18-39: CPT | Performed by: NURSE PRACTITIONER

## 2021-11-24 PROCEDURE — 99212 OFFICE O/P EST SF 10 MIN: CPT | Mod: 25 | Performed by: NURSE PRACTITIONER

## 2021-11-24 PROCEDURE — 87591 N.GONORRHOEAE DNA AMP PROB: CPT | Performed by: NURSE PRACTITIONER

## 2021-11-24 ASSESSMENT — MIFFLIN-ST. JEOR: SCORE: 1854.72

## 2021-11-24 ASSESSMENT — PAIN SCALES - GENERAL: PAINLEVEL: NO PAIN (0)

## 2021-11-24 ASSESSMENT — PATIENT HEALTH QUESTIONNAIRE - PHQ9
SUM OF ALL RESPONSES TO PHQ QUESTIONS 1-9: 16
10. IF YOU CHECKED OFF ANY PROBLEMS, HOW DIFFICULT HAVE THESE PROBLEMS MADE IT FOR YOU TO DO YOUR WORK, TAKE CARE OF THINGS AT HOME, OR GET ALONG WITH OTHER PEOPLE: VERY DIFFICULT
SUM OF ALL RESPONSES TO PHQ QUESTIONS 1-9: 16

## 2021-11-24 NOTE — TELEPHONE ENCOUNTER
letter was picked up from  by patient. ID was checked and patient  label was attached to patient  log.

## 2021-11-24 NOTE — LETTER
RE: Honey Ramirez  : 2000    DATE: 2021      To Whom It May Concern,        Honey Ramirez was seen in the clinic today. Please excuse her absence from work.        Thank you.      Sincerely,            Kinsey CASTREJON CNP
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
## 2021-11-25 LAB
C TRACH DNA SPEC QL NAA+PROBE: NEGATIVE
N GONORRHOEA DNA SPEC QL NAA+PROBE: NEGATIVE

## 2021-11-25 ASSESSMENT — PATIENT HEALTH QUESTIONNAIRE - PHQ9: SUM OF ALL RESPONSES TO PHQ QUESTIONS 1-9: 16

## 2021-11-26 LAB — HCV AB SERPL QL IA: NONREACTIVE

## 2021-11-29 LAB
BKR LAB AP GYN ADEQUACY: NORMAL
BKR LAB AP GYN INTERPRETATION: NORMAL
BKR LAB AP HPV REFLEX: NO
BKR LAB AP PREVIOUS ABNORMAL: NORMAL
PATH REPORT.COMMENTS IMP SPEC: NORMAL
PATH REPORT.COMMENTS IMP SPEC: NORMAL
PATH REPORT.RELEVANT HX SPEC: NORMAL

## 2021-11-30 ENCOUNTER — ANCILLARY PROCEDURE (OUTPATIENT)
Dept: ULTRASOUND IMAGING | Facility: CLINIC | Age: 21
End: 2021-11-30
Attending: NURSE PRACTITIONER
Payer: COMMERCIAL

## 2021-11-30 DIAGNOSIS — R10.2 PELVIC PAIN IN FEMALE: ICD-10-CM

## 2021-11-30 PROCEDURE — 76856 US EXAM PELVIC COMPLETE: CPT | Performed by: RADIOLOGY

## 2021-11-30 PROCEDURE — 76830 TRANSVAGINAL US NON-OB: CPT | Performed by: RADIOLOGY

## 2021-12-07 ENCOUNTER — OFFICE VISIT (OUTPATIENT)
Dept: OBGYN | Facility: CLINIC | Age: 21
End: 2021-12-07
Payer: COMMERCIAL

## 2021-12-07 VITALS
WEIGHT: 247.6 LBS | BODY MASS INDEX: 42.27 KG/M2 | HEART RATE: 87 BPM | DIASTOLIC BLOOD PRESSURE: 85 MMHG | HEIGHT: 64 IN | SYSTOLIC BLOOD PRESSURE: 141 MMHG | OXYGEN SATURATION: 98 %

## 2021-12-07 DIAGNOSIS — Z30.46 ENCOUNTER FOR NEXPLANON REMOVAL: Primary | ICD-10-CM

## 2021-12-07 PROCEDURE — 11982 REMOVE DRUG IMPLANT DEVICE: CPT | Performed by: NURSE PRACTITIONER

## 2021-12-07 ASSESSMENT — MIFFLIN-ST. JEOR: SCORE: 1860.17

## 2021-12-07 ASSESSMENT — PAIN SCALES - GENERAL: PAINLEVEL: NO PAIN (0)

## 2021-12-07 NOTE — PROGRESS NOTES
"Honey Ramirez is a 21 year old female  No LMP recorded. Patient has had an implant. After 1 year of Nexplanon as a contraceptive, she was here today for its removal. Has been having undesired side effects. Implant was not inserted at a [facility]. Declines alternate contraception at this time. Patient medical, surgical, social, and family history reviewed and updated. ROS: 10 point ROS neg other than the symptoms noted above in the HPI.    Honey was counseled about removal. She voiced her understanding and informed consent was obtained.    BP (!) 141/85 (BP Location: Right arm, Patient Position: Sitting, Cuff Size: Adult Large)   Pulse 87   Ht 1.613 m (5' 3.5\")   Wt 112.3 kg (247 lb 9.6 oz)   SpO2 98%   BMI 43.17 kg/m     This is a well appearing female in no acute distress. Answers questions and maintains eye contact appropriately.     PROCEDURE:  Patient was placed in a supine position. Left arm was flexed at the elbow and externally rotated. The implant was located by palpation and marked at the end closest to the elbow with a sterile marker.     The area was cleaned and antiseptic applied. 1cc of 1% Lidocaine was injected just underneath the end of the implant closest to the elbow.  Light pressure was applied on the end of the implant closest to the elbow and a 2 mm incision was made in the arm at the tip of the implant closest to the elbow. The implant was gently pushed toward the incision until the tip was visible and was grasped with sterile mosquito foreceps and gently removed.  The incision was closed with a butterfly closure and an adhesive bandage applied. A sterile gauze with pressure bandage was also applied.    Aseptic conditions were maintained throughout the procedure. The patient tolerated the procedure well.  No apparent complications. Follow up as needed.     Kinsey CASTREJON CNP      "

## 2022-02-02 ENCOUNTER — TELEPHONE (OUTPATIENT)
Dept: FAMILY MEDICINE | Facility: CLINIC | Age: 22
End: 2022-02-02
Payer: COMMERCIAL

## 2022-02-02 NOTE — TELEPHONE ENCOUNTER
Patient Quality Outreach    Patient is due for the following:   Depression  -  PHQ-9 Needed    NEXT STEPS:   PHQ9/DELICIA sent to jose tto complete and return    Type of outreach:    [facility]      Questions for provider review:    None     Juliet Reynoso, CMA

## 2022-04-29 ENCOUNTER — OFFICE VISIT (OUTPATIENT)
Dept: FAMILY MEDICINE | Facility: CLINIC | Age: 22
End: 2022-04-29
Payer: COMMERCIAL

## 2022-04-29 VITALS
TEMPERATURE: 97.6 F | BODY MASS INDEX: 42.99 KG/M2 | RESPIRATION RATE: 20 BRPM | HEART RATE: 85 BPM | WEIGHT: 251.8 LBS | DIASTOLIC BLOOD PRESSURE: 88 MMHG | HEIGHT: 64 IN | SYSTOLIC BLOOD PRESSURE: 138 MMHG | OXYGEN SATURATION: 96 %

## 2022-04-29 DIAGNOSIS — E66.01 MORBID OBESITY (H): ICD-10-CM

## 2022-04-29 DIAGNOSIS — F33.40 RECURRENT MAJOR DEPRESSIVE DISORDER, IN REMISSION (H): ICD-10-CM

## 2022-04-29 DIAGNOSIS — M25.571 BILATERAL ANKLE PAIN, UNSPECIFIED CHRONICITY: Primary | ICD-10-CM

## 2022-04-29 DIAGNOSIS — M25.572 BILATERAL ANKLE PAIN, UNSPECIFIED CHRONICITY: Primary | ICD-10-CM

## 2022-04-29 PROBLEM — F33.9 MAJOR DEPRESSION, RECURRENT (H): Status: ACTIVE | Noted: 2022-04-29

## 2022-04-29 LAB — URATE SERPL-MCNC: 8.8 MG/DL (ref 2.6–6)

## 2022-04-29 PROCEDURE — 84550 ASSAY OF BLOOD/URIC ACID: CPT | Performed by: FAMILY MEDICINE

## 2022-04-29 PROCEDURE — 99213 OFFICE O/P EST LOW 20 MIN: CPT | Performed by: FAMILY MEDICINE

## 2022-04-29 PROCEDURE — 36415 COLL VENOUS BLD VENIPUNCTURE: CPT | Performed by: FAMILY MEDICINE

## 2022-04-29 RX ORDER — NAPROXEN 500 MG/1
500 TABLET ORAL 2 TIMES DAILY PRN
Qty: 60 TABLET | Refills: 0 | Status: SHIPPED | OUTPATIENT
Start: 2022-04-29 | End: 2023-11-09

## 2022-04-29 ASSESSMENT — ASTHMA QUESTIONNAIRES
QUESTION_2 LAST FOUR WEEKS HOW OFTEN HAVE YOU HAD SHORTNESS OF BREATH: MORE THAN ONCE A DAY
ACT_TOTALSCORE: 18
QUESTION_1 LAST FOUR WEEKS HOW MUCH OF THE TIME DID YOUR ASTHMA KEEP YOU FROM GETTING AS MUCH DONE AT WORK, SCHOOL OR AT HOME: A LITTLE OF THE TIME
QUESTION_3 LAST FOUR WEEKS HOW OFTEN DID YOUR ASTHMA SYMPTOMS (WHEEZING, COUGHING, SHORTNESS OF BREATH, CHEST TIGHTNESS OR PAIN) WAKE YOU UP AT NIGHT OR EARLIER THAN USUAL IN THE MORNING: NOT AT ALL
QUESTION_4 LAST FOUR WEEKS HOW OFTEN HAVE YOU USED YOUR RESCUE INHALER OR NEBULIZER MEDICATION (SUCH AS ALBUTEROL): ONCE A WEEK OR LESS
ACT_TOTALSCORE: 18
QUESTION_5 LAST FOUR WEEKS HOW WOULD YOU RATE YOUR ASTHMA CONTROL: WELL CONTROLLED

## 2022-04-29 ASSESSMENT — ANXIETY QUESTIONNAIRES
5. BEING SO RESTLESS THAT IT IS HARD TO SIT STILL: MORE THAN HALF THE DAYS
GAD7 TOTAL SCORE: 16
IF YOU CHECKED OFF ANY PROBLEMS ON THIS QUESTIONNAIRE, HOW DIFFICULT HAVE THESE PROBLEMS MADE IT FOR YOU TO DO YOUR WORK, TAKE CARE OF THINGS AT HOME, OR GET ALONG WITH OTHER PEOPLE: VERY DIFFICULT
7. FEELING AFRAID AS IF SOMETHING AWFUL MIGHT HAPPEN: SEVERAL DAYS
6. BECOMING EASILY ANNOYED OR IRRITABLE: MORE THAN HALF THE DAYS
1. FEELING NERVOUS, ANXIOUS, OR ON EDGE: NEARLY EVERY DAY
2. NOT BEING ABLE TO STOP OR CONTROL WORRYING: MORE THAN HALF THE DAYS
3. WORRYING TOO MUCH ABOUT DIFFERENT THINGS: NEARLY EVERY DAY

## 2022-04-29 ASSESSMENT — PATIENT HEALTH QUESTIONNAIRE - PHQ9
5. POOR APPETITE OR OVEREATING: NEARLY EVERY DAY
SUM OF ALL RESPONSES TO PHQ QUESTIONS 1-9: 13

## 2022-04-29 NOTE — PATIENT INSTRUCTIONS
Dio Méndez,    Thank you for allowing [facility] to manage your care.    I ordered some blood work, please go to the laboratory to get your laboratory studies.    I sent your prescriptions to your pharmacy.    Please decrease your alcohol and meat consumption.    For your convenience, test results are released as soon as they are available  Please allow 1-2 business days for me to send you a comment about your results.  If not done so, I encourage you to login into [employer] ([email or web address] to review your results in real time.     If you have any questions or concerns, please feel free to call us at [phone].    Sincerely,    Dr. Bran    Did you know?      You can schedule a video visit for follow-up appointments as well as future appointments for certain conditions.  Please see the below link.     [email or web address]    If you have not already done so,  I encourage you to sign up for [employer] ([email or web address]).  This will allow you to review your results, securely communicate with a provider, and schedule virtual visits as well.    Gout    Gout is an inflammation of a joint caused by an inflammatory response to gout crystals in the joint fluid. This occurs when there is excess uric acid. Uric acid is a normal waste product in the body. It builds up in the body when the kidneys can't filter enough of it from the blood. This may occur with age. It is also associated with kidney disease. Gout occurs more often in people with obesity, diabetes, high blood pressure, or high levels of fats in the blood. It may run in families. Gout tends to come and go. A flare up of gout is called an attack. Drinking alcohol or eating certain foods (such as shellfish or foods with additives such as high-fructose corn syrup) may increase uric acid levels in the blood and cause a gout attack.   During a gout attack, the affected joint may become hot,  red, swollen, and painful. If you have had one attack of gout, you are likely to have another. An attack of gout can be treated with medicine. If these attacks become frequent, a daily medicine may be prescribed to help the kidneys remove uric acid from the body.   Home care  During a gout attack:  Contact your healthcare provider for advice and therapy options at the early stages of a gout flare. Treating the flare right away can prevent it from getting worse.  Rest painful joints. If gout affects the joints of your foot or leg, you may want to use crutches for the first few days to keep from bearing weight on the affected joint.  When sitting or lying down, raise the painful joint to a level higher than your heart.  Apply an ice pack (ice cubes in a plastic bag wrapped in a thin towel) over the injured area for 20 minutes every 1 to 2 hours the first day for pain relief. Continue this 3 to 4 times a day for swelling and pain.  Avoid alcohol and foods listed below (see Preventing attacks) during a gout attack. Drink extra fluid to help flush the uric acid through your kidneys.  If you were prescribed a medicine to treat gout, take it as your healthcare provider has instructed. Don't skip doses.  Take anti-inflammatory medicine as directed by your healthcare provider.  If pain medicines have been prescribed, take them exactly as directed.    Preventing attacks  Limit or stop  alcohol use. Excess alcohol intake can cause a gout attack.  Limit these foods and beverages:  Organ meats, such as kidneys and liver  Certain seafoods (anchovies, sardines, shrimp, scallops, herring, mackerel)  Wild game, meat extracts and meat gravies  Foods and beverages sweetened with high-fructose corn syrup, such as sodas  Eat a healthy diet including low-fat and nonfat dairy, whole grains, and vegetables.  If you are overweight, talk to your healthcare provider about a weight reduction plan. Avoid fasting or extreme low calorie diets  (less than 900 calories per day). This will increase uric acid levels in the body.  If you have diabetes or high blood pressure, work with your doctor to manage these conditions.  Protect the joint from injury. Wear good fitting socks and shoes. Injury can trigger a gout attack.    Follow-up care  Follow up with your healthcare provider, or as advised.   When to seek medical advice  Call your healthcare provider if you have any of the following:  Fever over 100.4 F (38. C) with worsening joint pain  Increasing redness around the joint  Pain developing in another joint  Repeated vomiting, abdominal pain, or blood in the vomit or stool (black or red color)  [employer] last reviewed this educational content on 6/1/2019 2000-2021 [employer]. All rights reserved. This information is not intended as a substitute for professional medical care. Always follow your healthcare professional's instructions.

## 2022-04-29 NOTE — PROGRESS NOTES
"1. Bilateral ankle pain, unspecified chronicity  Concerning for gout.    - Uric acid; Future  - naproxen (NAPROSYN) 500 MG tablet; Take 1 tablet (500 mg) by mouth 2 times daily as needed for moderate pain  Dispense: 60 tablet; Refill: 0  - Uric acid    2. Recurrent major depressive disorder, in remission (H)  Current receiving therapy.    3. Morbid obesity (H)  Encourage diet and exercise.       Mariel Ménedz is a 22 year old who presents for the following health issues  accompanied by her son.    Pt c/o bilateral ankle pain/swelling x4 months, worse after alcohol use - family h/o gout. Tylenol and ice have not been helpful.     History of Present Illness       Reason for visit:  My ankle has been in pain on and off and I m worried I may have Gout  Symptom onset:  3-4 weeks ago  Symptoms include:  Pain in ankle and swelling  Symptom intensity:  Moderate  Symptom progression:  Worsening  Had these symptoms before:  Yes  Has tried/received treatment for these symptoms:  No  What makes it worse:  When I do anything strenuous involvingy ankle.  What makes it better:  Wearing a brace    She eats 4 or more servings of fruits and vegetables daily.She consumes 3 sweetened beverage(s) daily.She exercises with enough effort to increase her heart rate 30 to 60 minutes per day.  She exercises with enough effort to increase her heart rate 4 days per week.   She is taking medications regularly.     1. Bilateral ankle pain: R>L.  Started at the end of the last year.  Gets hot.  Patient is concerned about gout.  No trauma.  Family history of gout.  Wraps back of heal and bottom heal.  Can cause her not walk sometimes.  Patient took tylenol with minimal improvement.  Gets worse with alcohol and red meat.     Review of Systems         Objective    /88 (Patient Position: Sitting, Cuff Size: Adult Large)   Pulse 85   Temp 97.6  F (36.4  C) (Tympanic)   Resp 20   Ht 1.613 m (5' 3.5\")   Wt 114.2 kg (251 lb 12.8 oz)   " SpO2 96%   BMI 43.90 kg/m    Body mass index is 43.9 kg/m .  Physical Exam  Constitutional:       General: She is not in acute distress.  HENT:      Head: Normocephalic and atraumatic.   Eyes:      Conjunctiva/sclera: Conjunctivae normal.   Neck:      Trachea: No tracheal deviation.   Cardiovascular:      Rate and Rhythm: Normal rate and regular rhythm.      Heart sounds: Normal heart sounds.   Pulmonary:      Effort: Pulmonary effort is normal. No respiratory distress.      Breath sounds: Normal breath sounds. No wheezing or rales.   Musculoskeletal:         General: Normal range of motion.      Cervical back: Normal range of motion.      Comments: Bilateral ankle: normal symmetry, good ROM in regards to dorsi/plantar flexion, good pedal pulse   Skin:     Findings: No rash.   Neurological:      Mental Status: She is alert.

## 2022-04-30 ASSESSMENT — ANXIETY QUESTIONNAIRES: GAD7 TOTAL SCORE: 16

## 2022-08-05 NOTE — PROGRESS NOTES
Honey presents today for her post-operative check.    She complains of post-operative pain.  She wasn't using narcotics in the hospital, but is more active at home and now needs more than ibuprofen..    Incisions: Healing well, well approximated and without signs of infection    Assessment:   1 week(s) status post ..  Plan: Oxycodone for breakthrough pain   Return 3-4 weeks.  Juan Leonardo MD FACOG           History from [facility]/

## 2022-10-23 ENCOUNTER — HEALTH MAINTENANCE LETTER (OUTPATIENT)
Age: 22
End: 2022-10-23

## 2023-04-02 ENCOUNTER — HEALTH MAINTENANCE LETTER (OUTPATIENT)
Age: 23
End: 2023-04-02

## 2023-11-09 ENCOUNTER — OFFICE VISIT (OUTPATIENT)
Dept: FAMILY MEDICINE | Facility: CLINIC | Age: 23
End: 2023-11-09
Payer: COMMERCIAL

## 2023-11-09 ENCOUNTER — TELEPHONE (OUTPATIENT)
Dept: FAMILY MEDICINE | Facility: CLINIC | Age: 23
End: 2023-11-09

## 2023-11-09 VITALS
SYSTOLIC BLOOD PRESSURE: 142 MMHG | BODY MASS INDEX: 41.32 KG/M2 | TEMPERATURE: 98.3 F | OXYGEN SATURATION: 97 % | RESPIRATION RATE: 18 BRPM | HEIGHT: 64 IN | WEIGHT: 242 LBS | HEART RATE: 85 BPM | DIASTOLIC BLOOD PRESSURE: 100 MMHG

## 2023-11-09 DIAGNOSIS — Z11.3 SCREENING FOR STDS (SEXUALLY TRANSMITTED DISEASES): ICD-10-CM

## 2023-11-09 DIAGNOSIS — Z13.29 SCREENING FOR THYROID DISORDER: ICD-10-CM

## 2023-11-09 DIAGNOSIS — R06.02 SHORTNESS OF BREATH: ICD-10-CM

## 2023-11-09 DIAGNOSIS — I10 ESSENTIAL HYPERTENSION: ICD-10-CM

## 2023-11-09 DIAGNOSIS — Z59.9 FINANCIAL DIFFICULTIES: ICD-10-CM

## 2023-11-09 DIAGNOSIS — E66.01 MORBID OBESITY (H): ICD-10-CM

## 2023-11-09 DIAGNOSIS — Z23 NEED FOR COVID-19 VACCINE: ICD-10-CM

## 2023-11-09 DIAGNOSIS — F33.1 MODERATE EPISODE OF RECURRENT MAJOR DEPRESSIVE DISORDER (H): Primary | ICD-10-CM

## 2023-11-09 DIAGNOSIS — M10.00 IDIOPATHIC GOUT, UNSPECIFIED CHRONICITY, UNSPECIFIED SITE: ICD-10-CM

## 2023-11-09 PROBLEM — F33.9 MAJOR DEPRESSION, RECURRENT (H): Status: RESOLVED | Noted: 2022-04-29 | Resolved: 2023-11-09

## 2023-11-09 PROCEDURE — 80048 BASIC METABOLIC PNL TOTAL CA: CPT | Performed by: FAMILY MEDICINE

## 2023-11-09 PROCEDURE — 91320 SARSCV2 VAC 30MCG TRS-SUC IM: CPT | Performed by: FAMILY MEDICINE

## 2023-11-09 PROCEDURE — 99214 OFFICE O/P EST MOD 30 MIN: CPT | Performed by: FAMILY MEDICINE

## 2023-11-09 PROCEDURE — 87491 CHLMYD TRACH DNA AMP PROBE: CPT | Performed by: FAMILY MEDICINE

## 2023-11-09 PROCEDURE — 86696 HERPES SIMPLEX TYPE 2 TEST: CPT | Performed by: FAMILY MEDICINE

## 2023-11-09 PROCEDURE — 90480 ADMN SARSCOV2 VAC 1/ONLY CMP: CPT | Performed by: FAMILY MEDICINE

## 2023-11-09 PROCEDURE — 87591 N.GONORRHOEAE DNA AMP PROB: CPT | Performed by: FAMILY MEDICINE

## 2023-11-09 PROCEDURE — 87389 HIV-1 AG W/HIV-1&-2 AB AG IA: CPT | Performed by: FAMILY MEDICINE

## 2023-11-09 PROCEDURE — 36415 COLL VENOUS BLD VENIPUNCTURE: CPT | Performed by: FAMILY MEDICINE

## 2023-11-09 PROCEDURE — 86695 HERPES SIMPLEX TYPE 1 TEST: CPT | Performed by: FAMILY MEDICINE

## 2023-11-09 PROCEDURE — 84443 ASSAY THYROID STIM HORMONE: CPT | Performed by: FAMILY MEDICINE

## 2023-11-09 PROCEDURE — 86780 TREPONEMA PALLIDUM: CPT | Performed by: FAMILY MEDICINE

## 2023-11-09 RX ORDER — LISINOPRIL 10 MG/1
10 TABLET ORAL DAILY
Qty: 30 TABLET | Refills: 0 | Status: SHIPPED | OUTPATIENT
Start: 2023-11-09 | End: 2023-12-05 | Stop reason: SINTOL

## 2023-11-09 RX ORDER — ALLOPURINOL 100 MG/1
100 TABLET ORAL DAILY
Qty: 90 TABLET | Refills: 3 | Status: SHIPPED | OUTPATIENT
Start: 2023-11-09 | End: 2024-02-20

## 2023-11-09 RX ORDER — ALBUTEROL SULFATE 90 UG/1
2 AEROSOL, METERED RESPIRATORY (INHALATION) EVERY 6 HOURS PRN
Qty: 18 G | Refills: 3 | Status: SHIPPED | OUTPATIENT
Start: 2023-11-09

## 2023-11-09 SDOH — ECONOMIC STABILITY - INCOME SECURITY: PROBLEM RELATED TO HOUSING AND ECONOMIC CIRCUMSTANCES, UNSPECIFIED: Z59.9

## 2023-11-09 ASSESSMENT — ENCOUNTER SYMPTOMS
BRUISES/BLEEDS EASILY: 0
MYALGIAS: 0
DIZZINESS: 0
FREQUENCY: 0
PALPITATIONS: 0
ALLERGIC/IMMUNOLOGIC NEGATIVE: 1
DYSURIA: 0
SORE THROAT: 0
ARTHRALGIAS: 0
ABDOMINAL PAIN: 0
DIARRHEA: 0
CONSTIPATION: 0
CHILLS: 0
PSYCHIATRIC NEGATIVE: 1
PARESTHESIAS: 0
WEAKNESS: 0
HEADACHES: 0
EYE PAIN: 0
COUGH: 0
FEVER: 0
SHORTNESS OF BREATH: 0

## 2023-11-09 ASSESSMENT — PATIENT HEALTH QUESTIONNAIRE - PHQ9
10. IF YOU CHECKED OFF ANY PROBLEMS, HOW DIFFICULT HAVE THESE PROBLEMS MADE IT FOR YOU TO DO YOUR WORK, TAKE CARE OF THINGS AT HOME, OR GET ALONG WITH OTHER PEOPLE: SOMEWHAT DIFFICULT
SUM OF ALL RESPONSES TO PHQ QUESTIONS 1-9: 8
SUM OF ALL RESPONSES TO PHQ QUESTIONS 1-9: 8

## 2023-11-09 ASSESSMENT — PAIN SCALES - GENERAL: PAINLEVEL: NO PAIN (0)

## 2023-11-09 ASSESSMENT — ASTHMA QUESTIONNAIRES: ACT_TOTALSCORE: 18

## 2023-11-09 NOTE — COMMUNITY RESOURCES LIST (ENGLISH)
11/09/2023   [facility]  N/A  For questions about this resource list or additional care needs, please contact your primary care clinic or care manager.  Phone: [phone]   Email: N/A   Address: [address] [place] [address]   Hours: N/A        Transportation       Free or low-cost transportation  1  [employer] [facility] - Free or low-cost transportation Distance: 14.66 miles      In-Person   [address] [place] [address]  Language: English  Hours: Mon - Fri 9:00 AM - 5:00 PM  Fees: Free   Phone: (213) 491-4687 Email: info@ArriveBefore Website: https://www.ArriveBefore/     2  [facility] Distance: 14.73 miles      In-Person   [address] [place] [address]  Language: English  Hours: Mon - Wed 9:30 AM - 12:00 PM , Mon - Wed 1:00 PM - 2:00 PM Appt. Only  Fees: Free   Phone: (979) 800-9455 Email: info@saintolaf.org Website: http://www.saintolaf.org/     Transportation to medical appointments  3  [facility]  [facility] ([facility]) Distance: 7.91 miles      In-Person   [address] [place] [address]  Language: Hungarian, Tajik, English, Gujarati, Elle, Tamazight, Latvian, Yakut, Amharic, Japanese  Hours: Mon - Wed 9:00 AM - 5:00 PM , Thu 12:00 PM - 6:00 PM , Fri 9:00 AM - 5:00 PM , Sun 10:30 AM - 2:00 PM Appt. Only  Fees: Free   Phone: (915) 701-3105 Email: info@sewa-aifw.org Website: https://www.sewa-aifw.org/     4  [employer] Distance: 10.95 miles      In-Person   [address] [place] [address]  Language: English  Hours: Mon - Sat 4:00 AM - 6:00 PM  Fees: Insurance, Self Pay   Phone: (532) 149-1883 Email: delighttransportation1@The miqi.cn.com Website: https://Smartmarketonnect.[employer] [place] [place].[facility]./HelpMeConnect/Providers/Delight_Transportation/Transportation/2?returnUrl=%2FHelpMeConnect%2FSearch%2FBasicNeeds%2FTransportation%2FTransportationServices%3Fstart%3D40          Important Numbers & Websites        Emergency Services   911  [facility] Services   [facility]  Poison Control   (772) 591-8933  Suicide Prevention Lifeline   (358) 210-5266 (TALK)  Child Abuse Hotline   (603) 255-5011 (4-A-Child)  Sexual Assault Hotline   (752) 622-7439 (HOPE)  National Runaway Safeline   (532) 109-2963 (RUNAWAY)  All-Options Talkline   (800) 882-6904  Substance Abuse Referral   (690) 403-1166 (HELP)

## 2023-11-09 NOTE — TELEPHONE ENCOUNTER
Referral for RN HTN mgmt noted from Dr. Bran.   Patient was seen today and started lisinopril.    Needs 60 minute nurse/RN visit scheduled for RN HTN mgmt.    Attempted to call patient at home/mobile number, no answer, left message on voicemail; patient was instructed to return call to [facility] at [phone].    Fior ROMO RN  [facility] Triage

## 2023-11-09 NOTE — PATIENT INSTRUCTIONS
Dio Méndez,    Thank you for allowing [facility] to manage your care.    I ordered some blood work, please go to the laboratory to get your laboratory studies.    I sent your prescriptions to your pharmacy.    For your high blood pressure, I am starting you lisinopril 10 mg daily.  Encourage 2.5 hours (150 minutes) per week of cardiovascular activities (cycling, swimming, jogging, elliptical, or treadmill)      For your gout, I am starting you allopurinol 100 mg daily.      I ordered a , please call diagnostic imaging [phone] to schedule your test.    I made a RN hypertension  and care coordination referral, they will be calling in approximately 1 week to set up your appointment.  If you do not hear from them, please call the specialty number on your after visit.     For your convenience, test results are released as soon as they are available  Please allow 1-2 business days for me to send you a comment about your results.  If not done so, I encourage you to login into [employer] ([email or web address] to review your results in real time.     If you have any questions or concerns, please feel free to call us at [phone].    Sincerely,    Dr. Bran    Did you know?      You can schedule a video visit for follow-up appointments as well as future appointments for certain conditions.  Please see the below link.     [email or web address]    If you have not already done so,  I encourage you to sign up for [employer] ([email or web address]).  This will allow you to review your results, securely communicate with a provider, and schedule virtual visits as well.

## 2023-11-09 NOTE — COMMUNITY RESOURCES LIST (ENGLISH)
11/09/2023   [facility]  N/A  For questions about this resource list or additional care needs, please contact your primary care clinic or care manager.  Phone: [phone]   Email: N/A   Address: [address] [place] [address]   Hours: N/A        Transportation       Free or low-cost transportation  1  [employer] [facility] - Free or low-cost transportation Distance: 14.66 miles      In-Person   [address] [place] [address]  Language: English  Hours: Mon - Fri 9:00 AM - 5:00 PM  Fees: Free   Phone: (768) 874-6553 Email: info@Zapper Website: https://www.Zapper/     2  [facility] Distance: 14.73 miles      In-Person   [address] [place] [address]  Language: English  Hours: Mon - Wed 9:30 AM - 12:00 PM , Mon - Wed 1:00 PM - 2:00 PM Appt. Only  Fees: Free   Phone: (214) 165-9108 Email: info@saintolaf.org Website: http://www.saintolaf.org/     Transportation to medical appointments  3  [facility]  [facility] ([facility]IF[facility]) Distance: 7.91 miles      In-Person   [address] [place] [address]  Language: Polish, Serbian, English, Gujarati, Elle, Mohawk, Irish, Kinyarwanda, Kiswahili, Tajik  Hours: Mon - Wed 9:00 AM - 5:00 PM , Thu 12:00 PM - 6:00 PM , Fri 9:00 AM - 5:00 PM , Sun 10:30 AM - 2:00 PM Appt. Only  Fees: Free   Phone: (385) 639-7375 Email: info@sewa-aifw.org Website: https://www.sewa-aifw.org/     4  [employer] Distance: 10.95 miles      In-Person   [address] [place] [address]  Language: English  Hours: Mon - Sat 4:00 AM - 6:00 PM  Fees: Insurance, Self Pay   Phone: (101) 320-4068 Email: delighttransportation1@[employer]qwest Platforms.com Website: https://Heirloom Computingonnect.Kelkoo.[facility]./HelpMeConnect/Providers/Delight_Transportation/Transportation/2?returnUrl=%2FHelpMeConnect%2FSearch%2FBasicNeeds%2FTransportation%2FTransportationServices%3Fstart%3D40          Important Numbers & Websites        Emergency Services   911  [facility] Services   [facility]  Poison Control   (351) 798-1906  Suicide Prevention Lifeline   (818) 600-3380 (TALK)  Child Abuse Hotline   (995) 761-4028 (4-A-Child)  Sexual Assault Hotline   (912) 373-7364 (HOPE)  National Runaway Safeline   (625) 308-2548 (RUNAWAY)  All-Options Talkline   (408) 231-8239  Substance Abuse Referral   (484) 776-1894 (HELP)

## 2023-11-09 NOTE — PROGRESS NOTES
1. Moderate episode of recurrent major depressive disorder (H)  Chronic.  Declines medications at this time.  Continue with therapy at this time.     2. Essential hypertension  Poorly controlled.  Stressed the importance of diet and exercise.   - Basic metabolic panel; Future  - lisinopril (ZESTRIL) 10 MG tablet; Take 1 tablet (10 mg) by mouth daily  Dispense: 30 tablet; Refill: 0    3. Screening for STDs (sexually transmitted diseases)  Stressed the importance of safe sexual practices.   - NEISSERIA GONORRHOEA PCR; Future  - CHLAMYDIA TRACHOMATIS PCR  - HIV Antigen Antibody Combo; Future  - Herpes Simplex Virus 1 and 2 IgG; Future  - Treponema Abs w Reflex to RPR and Titer; Future    4. Morbid obesity (H)  Chronic.  Encourage diet and exercise.  Advised that this is most likely attributing to patient's hypertension.     5. Shortness of breath  States of intermittent asthma which get worse in the guido.   - albuterol (PROAIR HFA/PROVENTIL HFA/VENTOLIN HFA) 108 (90 Base) MCG/ACT inhaler; Inhale 2 puffs into the lungs every 6 hours as needed for shortness of breath, wheezing or cough  Dispense: 18 g; Refill: 3    6. Idiopathic gout, unspecified chronicity, unspecified site  Gout handout given to patient.  Chronic and stable.   - allopurinol (ZYLOPRIM) 100 MG tablet; Take 1 tablet (100 mg) by mouth daily  Dispense: 90 tablet; Refill: 3    7. Need for COVID-19 vaccine  - COVID-19 12+ (2023-24) (PFIZER)    8. Financial difficulties  - Primary Care - Care Coordination Referral; Future    9. Screening for thyroid disorder  - TSH with free T4 reflex; Future        Mariel Méndez is a 23 year old, presenting for the following health issues:  Hypertension and Arthritis      11/9/2023     3:12 PM   Additional Questions   Roomed by Nely/MA   Accompanied by Self         11/9/2023     3:12 PM   Patient Reported Additional Medications   Patient reports taking the following new medications N/A       History of Present  Illness     Asthma:  She presents for follow up of asthma.  She has no cough, no wheezing, and some shortness of breath. She is not using a relief medication.  She does not have a controller medication. Patient is aware of the following triggers: cold air and exercise or sports. The patient has not had a visit to the Emergency Room, Urgent Care or Hospital due to asthma since the last clinic visit.     Hypertension: She presents for follow up of hypertension.  She does check blood pressure  regularly outside of the clinic. Outside blood pressures have been over 140/90. She follows a low salt diet.     Reason for visit:  Just want to follow up from my last appointment and address some other health conditions I m worried about    She eats 4 or more servings of fruits and vegetables daily.She consumes 1 sweetened beverage(s) daily.She exercises with enough effort to increase her heart rate 10 to 19 minutes per day.  She exercises with enough effort to increase her heart rate 7 days per week.   She is taking medications regularly.           Gout/ Single Inflamed Joint  Onset/Duration: Constant pain  Description:   Location: ankle - right  Joint Swelling: No  Redness: No  Pain: YES 1-2  Intensity: mild  Progression of Symptoms: same  Accompanying Signs & Symptoms:  Fevers: No  History:   Trauma to the area: No  Previous history of gout: YES  Recent illness: No  Alcohol use: YES  Diuretic use: No  Precipitating or alleviating factors: None  Therapies tried and outcome: none tylenol arthritis.     Depression follow-up: Currently seeing a therapist for depression.  Dealing with stressors with , family illness, and unemployment.  Feels limited with resources. Has a three year old son.  Patient is currently not on any medications. History of suicide thoughts (overdose) in the past and would like to avoid medications.    2. Hypertension: Currently not on any medications.  Does not exercise.  Patient does admit to  "eating unhealthy.  Chronic condition.    3. Gout: States of soreness in the ankles.  Gets worse with salt intake.  Does not have pain right now.  States of 1-2 months.     4. STD screen: States of unprotected sex with multiple partners.       Review of Systems   Constitutional:  Negative for chills and fever.   HENT:  Negative for congestion, ear pain and sore throat.    Eyes:  Negative for pain and visual disturbance.   Respiratory:  Negative for cough and shortness of breath.    Cardiovascular:  Negative for chest pain, palpitations and peripheral edema.   Gastrointestinal:  Negative for abdominal pain, constipation and diarrhea.   Endocrine: Negative for polyuria.   Genitourinary:  Negative for dysuria, frequency and vaginal discharge.   Musculoskeletal:  Positive for arthritis. Negative for arthralgias and myalgias.   Skin:  Negative for rash.   Allergic/Immunologic: Negative.    Neurological:  Negative for dizziness, weakness, headaches and paresthesias.   Hematological:  Does not bruise/bleed easily.   Psychiatric/Behavioral: Negative.              Objective    BP (!) 142/100   Pulse 85   Temp 98.3  F (36.8  C) (Tympanic)   Resp 18   Ht 1.613 m (5' 3.5\")   Wt 109.8 kg (242 lb)   LMP 11/04/2023 (Exact Date)   SpO2 97%   BMI 42.19 kg/m    Body mass index is 42.19 kg/m .  Physical Exam  Constitutional:       General: She is not in acute distress.     Appearance: She is well-developed.   HENT:      Head: Normocephalic and atraumatic.      Nose: Nose normal.   Eyes:      Conjunctiva/sclera: Conjunctivae normal.   Neck:      Trachea: No tracheal deviation.   Cardiovascular:      Rate and Rhythm: Normal rate and regular rhythm.      Heart sounds: Normal heart sounds.   Pulmonary:      Effort: Pulmonary effort is normal. No respiratory distress.      Breath sounds: Normal breath sounds.   Musculoskeletal:         General: Normal range of motion.      Cervical back: Normal range of motion.   Skin:     General: " Skin is warm.   Neurological:      Mental Status: She is alert and oriented to person, place, and time.   Psychiatric:         Behavior: Behavior normal.

## 2023-11-10 ENCOUNTER — PATIENT OUTREACH (OUTPATIENT)
Dept: CARE COORDINATION | Facility: CLINIC | Age: 23
End: 2023-11-10
Payer: COMMERCIAL

## 2023-11-10 DIAGNOSIS — B00.9 HERPES SIMPLEX TYPE 1 ANTIBODY POSITIVE: Primary | ICD-10-CM

## 2023-11-10 LAB
ANION GAP SERPL CALCULATED.3IONS-SCNC: 10 MMOL/L (ref 7–15)
BUN SERPL-MCNC: 12 MG/DL (ref 6–20)
C TRACH DNA SPEC QL NAA+PROBE: NEGATIVE
CALCIUM SERPL-MCNC: 9.4 MG/DL (ref 8.6–10)
CHLORIDE SERPL-SCNC: 105 MMOL/L (ref 98–107)
CREAT SERPL-MCNC: 0.78 MG/DL (ref 0.51–0.95)
DEPRECATED HCO3 PLAS-SCNC: 25 MMOL/L (ref 22–29)
EGFRCR SERPLBLD CKD-EPI 2021: >90 ML/MIN/1.73M2
GLUCOSE SERPL-MCNC: 101 MG/DL (ref 70–99)
HIV 1+2 AB+HIV1 P24 AG SERPL QL IA: NONREACTIVE
HSV1 IGG SERPL QL IA: 28.1 INDEX
HSV1 IGG SERPL QL IA: ABNORMAL
HSV2 IGG SERPL QL IA: 0.17 INDEX
HSV2 IGG SERPL QL IA: ABNORMAL
N GONORRHOEA DNA SPEC QL NAA+PROBE: NEGATIVE
POTASSIUM SERPL-SCNC: 4.2 MMOL/L (ref 3.4–5.3)
SODIUM SERPL-SCNC: 140 MMOL/L (ref 135–145)
T PALLIDUM AB SER QL: NONREACTIVE
TSH SERPL DL<=0.005 MIU/L-ACNC: 0.98 UIU/ML (ref 0.3–4.2)

## 2023-11-10 RX ORDER — ACYCLOVIR 800 MG/1
800 TABLET ORAL 2 TIMES DAILY
Qty: 10 TABLET | Refills: 2 | Status: SHIPPED | OUTPATIENT
Start: 2023-11-10 | End: 2024-08-05

## 2023-11-10 NOTE — PROGRESS NOTES
[facility]/Voicemail    Clinical Data: Care Coordinator Outreach    Outreach Documentation Number of Outreach Attempt   11/10/2023   3:44 PM 1       Left message on patient's voicemail with call back information and requested return call.    Plan:  Care Coordinator will try to reach patient again in 1-2 business days.    NOLAN Mendez, [place], [place] and [facility]  [phone]

## 2023-11-10 NOTE — TELEPHONE ENCOUNTER
I see patient is scheduled for MA BP check and AN clinic on 11/30/23.       Has orders for RN HTN mgmt.   I called and spoke to patient, she says she told the  she had doctor's orders but they seemed confused about what was needed.    She is interested in the RN HTN mgmt program.   Plans to start lisinopril this weekend so scheduled for 11/30/23, advised her BMP lab would be done on her way out the door after visit that day.       She states she will monitor home BP before visit as well.    Patient verbalized understanding of and agreement with plan.    Fior ROMO RN  [facility] Triage

## 2023-11-13 NOTE — PROGRESS NOTES
Community Health Worker Initial Outreach    CHW Initial Information Gathering:  Referral Source: PCP  [facility]  [phone]  Preferred Urgent Care: Other  Current living arrangement:: I live in a private home with family  Type of residence:: Private home - stairs  Community Resources: Financial/Insurance  Supplies Currently Used at Home: None  Equipment Currently Used at Home: none  Informal Support system:: Family, Friends, Significant other  No PCP office visit in Past Year: No  Transportation means:: Regular car  CHW Additional Questions  If ED/Hospital discharge, follow-up appointment scheduled as recommended?: N/A  Medication changes made following ED/Hospital discharge?: N/A  MyChart active?: Yes  Patient sent Social Determinants of Health questionnaire?: No    Patient accepts CC: Yes. Patient scheduled for assessment with CC  on 11/16 at 2 pm. Patient noted desire to discuss  resources and assistance.     NOLAN Mendez  [employer], [place], [place] and [facility]  [phone]

## 2023-11-16 ENCOUNTER — PATIENT OUTREACH (OUTPATIENT)
Dept: NURSING | Facility: CLINIC | Age: 23
End: 2023-11-16
Payer: COMMERCIAL

## 2023-11-16 SDOH — HEALTH STABILITY: PHYSICAL HEALTH: ON AVERAGE, HOW MANY DAYS PER WEEK DO YOU ENGAGE IN MODERATE TO STRENUOUS EXERCISE (LIKE A BRISK WALK)?: 7 DAYS

## 2023-11-16 SDOH — HEALTH STABILITY: PHYSICAL HEALTH: ON AVERAGE, HOW MANY MINUTES DO YOU ENGAGE IN EXERCISE AT THIS LEVEL?: 20 MIN

## 2023-11-16 ASSESSMENT — LIFESTYLE VARIABLES
HOW OFTEN DO YOU HAVE SIX OR MORE DRINKS ON ONE OCCASION: NEVER
AUDIT-C TOTAL SCORE: 1
HOW MANY STANDARD DRINKS CONTAINING ALCOHOL DO YOU HAVE ON A TYPICAL DAY: 1 OR 2
HOW OFTEN DO YOU HAVE A DRINK CONTAINING ALCOHOL: MONTHLY OR LESS
SKIP TO QUESTIONS 9-10: 1

## 2023-11-16 ASSESSMENT — SOCIAL DETERMINANTS OF HEALTH (SDOH)
HOW OFTEN DO YOU GET TOGETHER WITH FRIENDS OR RELATIVES?: MORE THAN THREE TIMES A WEEK
HOW OFTEN DO YOU ATTEND CHURCH OR RELIGIOUS SERVICES?: PATIENT DECLINED
ARE YOU MARRIED, WIDOWED, DIVORCED, SEPARATED, NEVER MARRIED, OR LIVING WITH A PARTNER?: NEVER MARRIED
DO YOU BELONG TO ANY CLUBS OR ORGANIZATIONS SUCH AS CHURCH GROUPS UNIONS, FRATERNAL OR ATHLETIC GROUPS, OR SCHOOL GROUPS?: PATIENT DECLINED
HOW OFTEN DO YOU ATTENT MEETINGS OF THE CLUB OR ORGANIZATION YOU BELONG TO?: PATIENT DECLINED
IN A TYPICAL WEEK, HOW MANY TIMES DO YOU TALK ON THE PHONE WITH FAMILY, FRIENDS, OR NEIGHBORS?: MORE THAN THREE TIMES A WEEK

## 2023-11-16 ASSESSMENT — ACTIVITIES OF DAILY LIVING (ADL): DEPENDENT_IADLS:: INDEPENDENT

## 2023-11-16 NOTE — LETTER
[facility] CARE COORDINATION  [address]  [place] [address]   November 16, 2023    Honey Ramirez  [address] [address]      Dear Honey,    I am a clinic care coordinator who works with Nikolay Bran DO with the [facility]. I wanted to introduce myself and provide you with my contact information for you to be able to call me with any questions or concerns. Below is a description of clinic care coordination and how I can further assist you.       The clinic care coordination team is made up of a registered nurse, , financial resource worker and community health worker who understand the health care system. The goal of clinic care coordination is to help you manage your health and improve access to the health care system. Our team works alongside your provider to assist you in determining your health and social needs. We can help you obtain health care and community resources, providing you with necessary information and education. We can work with you through any barriers and develop a care plan that helps coordinate and strengthen the communication between you and your care team.  Our services are voluntary and are offered without charge to you personally.    Please feel free to contact me with any questions or concerns regarding care coordination and what we can offer.      Resources discussed today:    Please apply for unemployment benefits as desired at   [email or web address]      We are focused on providing you with the highest-quality healthcare experience possible.    Sincerely,     Gail Monroy, BUSTER, MSW Clinic   [facility]  Care Coordination  [facility]   [email or web address]  Office: [phone]  Employed by [employer]      Enclosed: I have enclosed a copy of a 24 Hour Access Plan. This has helpful phone numbers for you to  call when needed. Please keep this in an easy to access place to use as needed.

## 2023-11-16 NOTE — PROGRESS NOTES
Clinic Care Coordination Contact  Clinic Care Coordination Contact  OUTREACH    Referral Information: initial SW phone assessment   Referral Source: PCP    Primary Diagnosis: Psychosocial    Chief Complaint   Patient presents with    Clinic Care Coordination - Initial     SW        Universal Utilization: no concerns noted   Clinic Utilization  Difficulty keeping appointments:: No  Compliance Concerns: Yes (weight, exercise, per recent office visit note)  No-Show Concerns: No  No PCP office visit in Past Year: No  Utilization      No Show Count (past year)  1             ED Visits  0             Hospital Admissions  0                    Current as of: 11/16/2023  1:47 AM                Clinical Concerns: Patient diagnoses include anxiety, depression and PTSD.  Patient reported stressors related to , finances and unemployment. SW discussed process for unemployment.  Patient will research and apply.  Patient reports having been approved for child support from [employer], however; now father of the child is planning to pursue custody.      Patient reports worrying about her aging parents, tends to focus on them and not herself. She reports she is the only child who is currently not working, her working siblings see it as her responsibility to assist her parents, however, this is a stressor for her.  It was noted that provider recommended exercise and diet changes in recent visit.  It was noted HTN is likely related to weight.  This was discussed.  Patient states her awareness and that she needs to exercise.  Patient reports having a gym membership but does not feel comfortable leaving her child in their  while exercising.  She reports she cannot focus as worries about her child.  SW discussed bringing the child with her for walks outside (in the mall in inclement weather).  We discussed she walk 2-3 times per week for 20-30 minutes as able.  Patient is accepting and will attempt to do so.  She reports  being unable to exercise this week due to gout flare ups.  She reports taking the medications as prescribed, but still is painful.  She states this has improved and feels better today    Patient reports social alcohol use, some marijuana use for sleep and anxiety     Patient reports having intermittent asthma, inhaler recently prescribed.  Patient reports SOB becomes worse in winter, colder weather    Current Medical Concerns:   Patient Active Problem List   Diagnosis    Asthma, intermittent    Class 3 obesity due to excess calories without serious comorbidity with body mass index (BMI) of 40.0 to 44.9 in adult    Motion sickness    Amenorrhea, secondary    Gestational diabetes mellitus (GDM) in third trimester, gestational diabetes method of control unspecified    Morbid obesity (H)    Moderate episode of recurrent major depressive disorder (H)    Herpes simplex type 1 antibody positive      Current Behavioral Concerns: Patient diagnoses include anxiety, depression and PTSD.  Patient reports she talks with therapy every other week and finds this very helpful, she reports family and friends are very supportive.  Patient declines to take medications for mood.  SI in the past, none currently    Education Provided to patient: [place] to [place] for unemployment; discuss  resources with [facility]; crisis resources--she has these   Pain  Pain (GOAL):: No  Health Maintenance Reviewed: Due/Overdue   Health Maintenance Due   Topic Date Due    NICOTINE/TOBACCO CESSATION COUNSELING Q 1 YR  Never done    ADVANCE CARE PLANNING  Never done    DEPRESSION ACTION PLAN  Never done    Pneumococcal Vaccine: Pediatrics (0 to 5 Years) and At-Risk Patients (6 to 64 Years) (2 - PCV) 09/13/2019    ASTHMA ACTION PLAN  06/02/2022    YEARLY PREVENTIVE VISIT  11/24/2022    INFLUENZA VACCINE (1) 09/01/2023        Did not address today as patient has current financial and psychosocial stressors     Clinical  Pathway: None    Medication Management:  Medication review status: Medications reviewed and no changes reported per patient.           Functional Status:  Dependent ADLs:: Independent  Dependent IADLs:: Independent  Bed or wheelchair confined:: No  Mobility Status: Independent  Fallen 2 or more times in the past year?: No  Any fall with injury in the past year?: No    Living Situation:  Current living arrangement:: I live in a private home with family (mom)  Type of residence:: Private home - stairs    Lifestyle & Psychosocial Needs:    Social Determinants of Health     Food Insecurity: Low Risk  (11/16/2023)    Food Insecurity     Within the past 12 months, did you worry that your food would run out before you got money to buy more?: No     Within the past 12 months, did the food you bought just not last and you didn t have money to get more?: No   Depression: Not at risk (11/9/2023)    PHQ-2     PHQ-2 Score: 2   Housing Stability: Low Risk  (11/9/2023)    Housing Stability     Do you have housing? : Yes     Are you worried about losing your housing?: No   Tobacco Use: High Risk (11/16/2023)    Patient History     Smoking Tobacco Use: Every Day     Smokeless Tobacco Use: Former     Passive Exposure: Not on file   Financial Resource Strain: Low Risk  (11/16/2023)    Financial Resource Strain     Within the past 12 months, have you or your family members you live with been unable to get utilities (heat, electricity) when it was really needed?: No   Alcohol Use: Not At Risk (11/16/2023)    AUDIT-C     Frequency of Alcohol Consumption: Monthly or less     Average Number of Drinks: 1 or 2     Frequency of Binge Drinking: Never   Transportation Needs: Low Risk  (11/16/2023)    Transportation Needs     Within the past 12 months, has lack of transportation kept you from medical appointments, getting your medicines, non-medical meetings or appointments, work, or from getting things that you need?: No   Recent Concern:  Transportation Needs - High Risk (11/9/2023)    Transportation Needs     Within the past 12 months, has lack of transportation kept you from medical appointments, getting your medicines, non-medical meetings or appointments, work, or from getting things that you need?: Yes   Physical Activity: Insufficiently Active (11/16/2023)    Exercise Vital Sign     Days of Exercise per Week: 7 days     Minutes of Exercise per Session: 20 min   Interpersonal Safety: Low Risk  (11/9/2023)    Interpersonal Safety     Do you feel physically and emotionally safe where you currently live?: Yes     Within the past 12 months, have you been hit, slapped, kicked or otherwise physically hurt by someone?: No     Within the past 12 months, have you been humiliated or emotionally abused in other ways by your partner or ex-partner?: No   Stress: Stress Concern Present (11/16/2023)    [place] [employer] - Occupational Stress Questionnaire     Feeling of Stress : Rather much   Social Connections: Unknown (11/16/2023)    Social Connection and Isolation Panel [NHANES]     Frequency of Communication with Friends and Family: More than three times a week     Frequency of Social Gatherings with Friends and Family: More than three times a week     Attends Hinduism Services: Patient refused     Active Member of Clubs or Organizations: Patient refused     Attends Club or Organization Meetings: Patient refused     Marital Status: Never      Diet::  (low salt diet)  Inadequate nutrition (GOAL):: No  Tube Feeding: No  Inadequate activity/exercise (GOAL):: No  Significant changes in sleep pattern (GOAL): Yes (some nights worse than others, about 6 hours per night)  Transportation means:: Regular car     Hinduism or spiritual beliefs that impact treatment:: No  Mental health DX:: Yes  Mental health DX how managed:: Outpatient Counseling (Zayra every other week, virtual therapy)  Mental health management concern (GOAL)::  Yes  Chemical Dependency Status: Current Concern (marijuana sometimes for anxiety, stress relief)  Informal Support system:: Family, Friends, Significant other      Resources and Interventions: unemployment website m[place].[employer] to apply for unemployment; discuss  resources with [facility]; crisis resources    Current Resources: SNAP and MA; has received some child support     Community Resources: Financial/Insurance (SNAP and MA; has received some child support)  Supplies Currently Used at Home: Other (inhaler, asthma, recently prescribed)  Equipment Currently Used at Home: none  Employment Status: employment seeking (difficulty obtaining employment as does not have )       Advance Care Plan/Directive  Advanced Care Plans/Directives on file:: No  Discussed with patient/caregiver:: Declined Further Information    Referrals Placed: Other  (unemployment website [email or web address].[employer] to apply for unemployment; discuss  resources with [facility]; crisis resources--she has these)     Care Plan:  Care Plan: Mental Health       Problem: Mental Health Symptoms Need Improvement       Goal: Improve management of mental health symptoms and establish with mental health/psychosocial supports       Start Date: 11/16/2023 Expected End Date: 6/21/2024    This Visit's Progress: 30%    Priority: High    Note:     Barriers: anxiety, depression and PTSD; stressors related to , finances and unemployment  Strengths: SW discussed process for unemployment, approved for child support from [facility] but father now attempting custody--this is in process; finds current therapist very helpful, they talk every other week; family and friends very supportive   Patient expressed understanding of goal: yes  Action steps to achieve this goal:  1. I will find time to apply for unemployment benefits   2. I will continue to see therapist as scheduled   3. I will walk 2-3 times per week for stress reduction, exercise and mental health    4. I will reach out to family and friends for emotional support in times of increased stress and/or utilize crisis resources                            Care Plan: Physical Activity       Problem: Patient is inactive       Goal: Increase Physical Activity, walks 2-3 days per week for 20-30 minutes       Start Date: 11/16/2023 Expected End Date: 6/21/2024    This Visit's Progress: 0%    Priority: High    Note:     Barriers: provider recommendation for exercise related to weight and HTN; unable to exercise at gym as does not have    Strengths: patient states plan to walk 2-3 times per week 20-30 minutes with child   Patient expressed understanding of goal: yes  Action steps to achieve this goal: yes  1. I will find time to walk 20-30 minutes three nights per week for 20-30 minutes   2. I will walk less if time does not permit, but still goal for three times per week   3. I will work on a healthy diet as recommended by provider                               Patient/Caregiver understanding: Patient will walk 2-3 times per week for stress reduction, exercise and mental health.  Patient will apply for unemployment benefits     Outreach Frequency: monthly, more frequently as needed  Future Appointments                In 2 weeks BE RN [facility]    In 1 month Nikolay Bran DO [facility]            Plan:   1) Patient will walk 2-3 times per week for stress reduction, exercise and mental health  2) Patient will apply for unemployment benefits  3) SW will send introduction letter and Complex care plan  4) SW will update PCP  5) CHW will call in 1 month for update, Care Coordinator will review progress to goals and plan of care in 6 weeks.     Gail Monroy, BUSTER, MSW   [facility]  Care Coordination  [facility]  [phone]  11/16/2023 3:18 PM

## 2023-11-16 NOTE — LETTER
[facility]  Patient Centered Plan of Care  About Me:        Patient Name:  Honey Shell    YOB: 2000  Age:         23 year old   Skylar MRN:    [id number] Telephone Information:  Home Phone [phone]   Mobile [phone]       Address:  [address] [address] Email address:  [email or web address]      Emergency Contact(s)    Name Relationship Lgl Grd Work Phone Home Phone Mobile Phone   1. NANI DAVILA Mother   [phone]    2. CHANTELLE SHELL Father   [phone]   3. MARIE SHELL Sister    [phone]           Primary language:  English     needed? No   [facility] [employer] [facility]:  [phone] op. 1  Other communication barriers:None    Preferred Method of Communication:  Beatriz, Jessica   Current living arrangement: I live in a private home with family (mom)    Mobility Status/ Medical Equipment: Independent        Health Maintenance  Health Maintenance Reviewed: Due/Overdue   Health Maintenance Due   Topic Date Due    NICOTINE/TOBACCO CESSATION COUNSELING Q 1 YR  Never done    ADVANCE CARE PLANNING  Never done    DEPRESSION ACTION PLAN  Never done    Pneumococcal Vaccine: Pediatrics (0 to 5 Years) and At-Risk Patients (6 to 64 Years) (2 - PCV) 09/13/2019    ASTHMA ACTION PLAN  06/02/2022    YEARLY PREVENTIVE VISIT  11/24/2022    INFLUENZA VACCINE (1) 09/01/2023          My Access Plan  Medical Emergency 911   Primary Clinic Line [facility] [phone]   24 Hour Appointment Line [phone] or  [phone]) (toll-free)   24 Hour Nurse Line [phone] (toll-free)   Preferred Urgent Care [facility], [phone]     Preferred Hospital [facility]  [phone]     Preferred Pharmacy [id number] [employer] [place] - [address]     Behavioral Health Crisis Line The National Suicide Prevention Lifeline at 1-463.429.4461 or Text/Call 655            My Care Team Members  Patient Care Team         Relationship Specialty Notifications Start End    Nikolay Bran DO PCP - General   2/9/23     Phone: [phone] Fax: [phone]         [address] [facility] [address]    Alma Lind RD Diabetes Educator Dietitian, Registered  6/3/20     Phone: [phone]          [facility] [address] [place] [address]    Nikolay Bran DO Assigned PCP   5/1/22     Phone: [phone] Fax: [phone]         [address] [facility] [address]    Gail Monroy BSW Lead Care Coordinator Primary Care -  Admissions 11/13/23     Phone: [phone] Fax: [phone]        Gardenia Cervantes CHW Community Health Worker Primary Care - CC Admissions 11/16/23     Phone: [phone] Fax: [phone]                    My Care Plans  Self Management and Treatment Plan    Care Plan  Care Plan: Mental Health       Problem: Mental Health Symptoms Need Improvement       Goal: Improve management of mental health symptoms and establish with mental health/psychosocial supports       Start Date: 11/16/2023 Expected End Date: 6/21/2024    This Visit's Progress: 30%    Priority: High    Note:     Barriers: anxiety, depression and PTSD; stressors related to , finances and unemployment  Strengths: SW discussed process for unemployment, approved for child support from [facility] but father now attempting custody--this is in process; finds current therapist very helpful, they talk every other week; family and friends very supportive   Patient expressed understanding of goal: yes  Action steps to achieve this goal:  1. I will find time to apply for unemployment benefits   2. I will continue to see therapist as scheduled   3. I will walk 2-3 times per week for stress reduction, exercise and mental health   4. I will reach out to family and friends for emotional support in times of increased stress  and/or utilize crisis resources                            Care Plan: Physical Activity       Problem: Patient is inactive       Goal: Increase Physical Activity, walks 2-3 days per week for 20-30 minutes       Start Date: 11/16/2023 Expected End Date: 6/21/2024    This Visit's Progress: 0%    Priority: High    Note:     Barriers: provider recommendation for exercise related to weight and HTN; unable to exercise at gym as does not have    Strengths: patient states plan to walk 2-3 times per week 20-30 minutes with child   Patient expressed understanding of goal: yes  Action steps to achieve this goal: yes  1. I will find time to walk 20-30 minutes three nights per week for 20-30 minutes   2. I will walk less if time does not permit, but still goal for three times per week   3. I will work on a healthy diet as recommended by provider                               Action Plans on File:   Asthma                   Advance Care Plans/Directives:   Advanced Care Plan/Directives on file:   No    Discussed with patient/caregiver(s):   Declined Further Information             My Medical and Care Information  Problem List   Patient Active Problem List   Diagnosis    Asthma, intermittent    Class 3 obesity due to excess calories without serious comorbidity with body mass index (BMI) of 40.0 to 44.9 in adult    Motion sickness    Amenorrhea, secondary    Gestational diabetes mellitus (GDM) in third trimester, gestational diabetes method of control unspecified    Morbid obesity (H)    Moderate episode of recurrent major depressive disorder (H)    Herpes simplex type 1 antibody positive          Care Coordination Start Date: 11/9/2023   Frequency of Care Coordination: monthly, more frequently as needed     Form Last Updated: 11/16/2023

## 2023-11-21 ENCOUNTER — TELEPHONE (OUTPATIENT)
Dept: FAMILY MEDICINE | Facility: CLINIC | Age: 23
End: 2023-11-21
Payer: COMMERCIAL

## 2023-11-21 NOTE — TELEPHONE ENCOUNTER
Review of RN schedule, this patient is scheduled for a 30 minute visit on 11/30 but it is first RN HTN visit which is 60 minutes long.  There is already a patient scheduled in the next spot, 30 minutes later.    Routed to TC team to contact patient to reschedule to a time slot that allows a 60 minute visit on the BE RN schedule.    Fior ROMO RN  [facility] Triage

## 2023-12-04 ENCOUNTER — TELEPHONE (OUTPATIENT)
Dept: FAMILY MEDICINE | Facility: CLINIC | Age: 23
End: 2023-12-04

## 2023-12-04 ENCOUNTER — ALLIED HEALTH/NURSE VISIT (OUTPATIENT)
Dept: FAMILY MEDICINE | Facility: CLINIC | Age: 23
End: 2023-12-04
Payer: COMMERCIAL

## 2023-12-04 VITALS — HEART RATE: 82 BPM | DIASTOLIC BLOOD PRESSURE: 86 MMHG | SYSTOLIC BLOOD PRESSURE: 125 MMHG | OXYGEN SATURATION: 98 %

## 2023-12-04 DIAGNOSIS — I10 HYPERTENSION GOAL BP (BLOOD PRESSURE) < 140/90: Primary | ICD-10-CM

## 2023-12-04 PROCEDURE — 99211 OFF/OP EST MAY X REQ PHY/QHP: CPT

## 2023-12-04 NOTE — PATIENT INSTRUCTIONS
Stop the lisinopril to see if the dry cough goes away.   Dr. Bran plans to send a new prescription for losartan, which is in a different class of medication.    Keep your scheduled visit with Dr. Bran on 1/11/24, consider bringing your home cuff along to double check against the clinic monitor.   Call if you are running out of losartan or have other concerns before your visit.

## 2023-12-04 NOTE — TELEPHONE ENCOUNTER
See today's RN HTN mgmt visit, under goal in clinic but patient reports bothersome dry cough, wakes her at night.    Huddled with Dr. Bran who advised he'd change her to losartan.    She will stop her lisinopril 10 mg.    Routed to Dr. Bran to put in new RN HTN mgmt order for ARB medication and send Rx to selected pharmacy.    Fior ROMO RN  [facility] Triage

## 2023-12-04 NOTE — PROGRESS NOTES
RN Hypertension Management and Prevention Visit  [facility] EMMANUEL  Dec 4, 2023    ASSESSMENT:  Is meeting goal, BUT reports bothersome dry cough on the lisinopril    huddled with Dr. Bran, he advises she stop the lisinopril and he will order losartan, will need new HTN mgmt order/plan for ARB    INTERVENTION:   Education provided today on:  Hypertension risk factor modification  Patient's current BP medication: lisinopril, is not tolerating (cough)    Patient verbalized understanding of concepts discussed and recommendations provided today.    PLAN:  AVS printed and provided to patient today.    FOLLOW-UP:  Follow-up appointment scheduled on seeing Dr. Bran on 1/11/24 so will use that as follow up on the new med losartan.  Chart routed to referring provider.  Ongoing plan for education and support: call if wants to get seen by RN sooner or has concerns      Any blood pressure medication dose changes were made via the Standing Orders.  A copy of this encounter was shared with the provider.  Fior Shah RN    Panhia 23 year old presents today for B/P check and possible medication titration related to Hypertension - Stage 1.  The patient's Blood Pressure Goal is < 140/90 .    She is accompanied by self.    Patient's blood pressure management related comments/concerns: reports elevated readings at home:   152/100 before coming in today; generally has had readings 120-150 over  reported at home.   It is possible her cuff may be too small.   Discussed how to size the cuff.   We used the large adult cuff in clinic as the regular adult was definitely too small.  Patient's emotional response to Hypertension: expresses readiness to learn    Patient would like this visit to be focused around the following hypertension-related behaviors and goals:  education - diet, exercise, stress management, home blood pressure monitoring, and reduce/stop vaping nicotine    Patient Problem List and Family  Medical History reviewed for relevant medical history, current medical status, and HTN risk factors.     Current Hypertension Management per Patient:  Current medication list reviewed with patient:  Yes    Taking blood pressure medication:  Yes   Taking as prescribed: Yes  Missed Doses: No  Do you have any difficulty affording your medications? No     Patient blood pressure self-monitoring as follows:  blood pressure log  Home BP monitor and cuff: Yes  Home BP results:  152/100 taken today after med taken, in general runs 120-150 over  per home monitor      Meeting goal:  Yes  but not tolerating med, provider will order alternative    Vitals:  /86   Pulse 82   LMP 11/04/2023 (Exact Date)   SpO2 98%   Last 3 blood pressure readings:   BP Readings from Last 3 Encounters:   12/04/23 125/86   11/09/23 (!) 142/100   04/29/22 138/88         12/4/2023   AMB PT REPORTED BLOOD PRESSURE   Systolic (Patient Reported) 152   Diastolic (Patient Reported) 100        History   Smoking Status    Every Day    Types: Vaping Device   Smokeless Tobacco    Not on file       The following was reported by the patient:  Reviewed patient self hypertension management skills.  Patient reports they have made/continued the following changes to:  Diet:  Food Intake:  reduce sodium intake, weight reduction, and healthy diet to include lots of veggie, DASH diet info provided  Biggest challenge to healthy eating/low sodium: emotional eating  Physical activity assessment:  Barriers: time,  responsibilities  Hypertension risk factors noted: overweight/obesity, nicotine/tobacco use, family history, and alcohol use  Relevant co-morbidities and related health problems: nicotine/tobacco use and obesity    Patient reported symptoms since last visit:  Any recent health service and resource utilization related to blood pressure or syncope?  None    Patient reports new/worsening:    Dry cough    Labs:  Labs since last visit  are not  "pending  Lab Results   Component Value Date     11/09/2023     06/02/2021    POTASSIUM 4.2 11/09/2023    POTASSIUM 3.9 10/28/2021    POTASSIUM 4.3 06/02/2021    CHLORIDE 105 11/09/2023    CHLORIDE 108 10/28/2021    CHLORIDE 107 06/02/2021    GA 9.4 11/09/2023    GA 9.0 06/02/2021    CO2 25 11/09/2023    CO2 27 10/28/2021    CO2 25 06/02/2021    BUN 12.0 11/09/2023    BUN 11 10/28/2021    BUN 8 06/02/2021    CR 0.78 11/09/2023    CR 0.87 06/02/2021     11/09/2023    GLC 99 10/28/2021     06/02/2021         Socio/Economic/Cultural Considerations   Support system: family  Cultural Influences/Ethnic Background:  Not  or   Patient reports extra stress lately, custody issues, has 3 year old son    Health Literacy/Numeracy:  \"With high blood pressure, it's helpful to use forms and log books to write down your blood pressure and the time of day that you checked it.  With this in mind how confident are you at filling out medical forms, such as these, by yourself? :   Not Assessed       HTN knowledge and skills assessment:   Patient is knowledgeable in blood pressure management concepts related to: diet, physical activity assessment, and relevant cormorbidities  Patient needs further education on the following HTN management concepts:  tobacco/nicotine cessation and hypertension risk factors   Barriers to Learning Assessment: No Barrier    "

## 2023-12-05 ENCOUNTER — MYC MEDICAL ADVICE (OUTPATIENT)
Dept: FAMILY MEDICINE | Facility: CLINIC | Age: 23
End: 2023-12-05
Payer: COMMERCIAL

## 2023-12-05 RX ORDER — LOSARTAN POTASSIUM 25 MG/1
25 TABLET ORAL DAILY
Qty: 30 TABLET | Refills: 1 | Status: SHIPPED | OUTPATIENT
Start: 2023-12-05 | End: 2024-02-20

## 2023-12-05 RX ORDER — LOSARTAN POTASSIUM 25 MG/1
25 TABLET ORAL DAILY
Qty: 30 TABLET | Refills: 0 | Status: SHIPPED | OUTPATIENT
Start: 2023-12-05 | End: 2023-12-05

## 2023-12-05 NOTE — TELEPHONE ENCOUNTER
I called and spoke to patient.   She is planning to follow up with Dr. Bran on 1/11/24 as scheduled and declined a nurse visit in between.    Therefore, needs a one month supply with one refill of the losartan sent.    Done.    Advised she call if her dry cough does not resolve or has other symptoms or concerns before the January visit.    Patient verbalized understanding of and agreement with plan.    Fior ROMO RN  [facility] Triage

## 2023-12-05 NOTE — TELEPHONE ENCOUNTER
Patient Quality Outreach    Patient is due for the following:   Asthma  -  ACT needed and AAP  Depression  -  DAP  Physical Preventive Adult Physical      Topic Date Due    Pneumococcal Vaccine (2 - PCV) 09/13/2019    Flu Vaccine (1) 09/01/2023       Next Steps:   Schedule a Adult Preventative    Type of outreach:    Sent [employer] message.      Questions for provider review:    None           Mercy Macias MA

## 2023-12-18 ENCOUNTER — PATIENT OUTREACH (OUTPATIENT)
Dept: CARE COORDINATION | Facility: CLINIC | Age: 23
End: 2023-12-18
Payer: COMMERCIAL

## 2023-12-18 NOTE — PROGRESS NOTES
[facility]/Voicemail    Clinical Data: Care Coordinator Outreach    Outreach Documentation Number of Outreach Attempt   11/10/2023   3:44 PM 1   12/18/2023  10:09 AM 1       Left message on patient's voicemail with call back information and requested return call.    Plan:   Care Coordinator will try to reach patient again in 3-5 business days.    Next outreach due: 12/26/23

## 2023-12-28 ENCOUNTER — PATIENT OUTREACH (OUTPATIENT)
Dept: CARE COORDINATION | Facility: CLINIC | Age: 23
End: 2023-12-28
Payer: COMMERCIAL

## 2023-12-28 ASSESSMENT — ASTHMA QUESTIONNAIRES: ACT_TOTALSCORE: 14

## 2023-12-28 NOTE — LETTER
[facility]ORDIN[facility]  [address]  [place] [address]    December 28, 2023    Honey Ramirez  [address] [address]      Dear Honey,    I have been attempting to reach you since our last contact. I would like to continue to work with you and provide any additional support you may need on achieving your health care related goals. I would appreciate if you would give me a call at [phone] to let me know if you would like to continue working together. I know that there are many things that can affect our ability to communicate and I hope we can continue to work together.    All of us at the [facility] are invested in your health and are here to assist you in meeting your goals.     Sincerely,    NOLAN Sharma

## 2023-12-28 NOTE — TELEPHONE ENCOUNTER
Questionnaire: Asthma Questionnaire     Question: 1. In the past 4 weeks, how much of the time did your asthma keep you from getting as much done at work, school or at home?  Answer:   A little of the time     Question: 2. During the past 4 weeks, how often have you had shortness of breath?  Answer:   More than once a day     Question: 3. During the past 4 weeks, how often did your asthma symptoms (wheezing, coughing, shortness of breath, chest tightness or pain) wake you up at night or earlier than usual in the morning?  Answer:   Once or twice     Question: 4. During the past 4 weeks, how often have you used your rescue inhaler or nebulizer medication (such as albuterol)?  Answer:   One or two times per day     Question: 5. How would you rate your asthma control during the past 4 weeks?  Answer:   Somewhat controlled     Questionnaire: Asthma Questionnaire     Question: In the past 12 months, how many times did you visit the emergency room for your asthma without being admitted to the hospital?  Answer:   None     Question: In the past 12 months, how many times were you hospitalized overnight because of your asthma?  Answer:   None

## 2023-12-29 NOTE — PROGRESS NOTES
[facility]/Voicemail    Clinical Data: Care Coordinator Outreach    Outreach Documentation Number of Outreach Attempt   11/10/2023   3:44 PM 1   12/18/2023  10:09 AM 1   12/29/2023   8:22 AM 2       Left message on patient's voicemail with call back information and requested return call.    Plan: Care Coordinator will send unable to contact letter with care coordinator contact information via [employer]. Care Coordinator will try to reach patient again in 10 business days.    Next outreach due: 1/11/24

## 2024-01-10 ENCOUNTER — PATIENT OUTREACH (OUTPATIENT)
Dept: CARE COORDINATION | Facility: CLINIC | Age: 24
End: 2024-01-10
Payer: COMMERCIAL

## 2024-01-10 NOTE — PROGRESS NOTES
Clinic Care Coordination Contact    Situation: Patient chart reviewed by care coordinator.    Background: Patient is enrolled     Assessment: CHW has made two unsuccessful attempts to reach patient, Unable to Contact letter sent and CHW will call again mid January     Plan/Recommendations: SW will review in 1-2 weeks     BUSTER Landaverde, MSW   [facility]  Care Coordination  [facility]  [phone]  1/10/2024 12:35 PM

## 2024-01-15 ENCOUNTER — PATIENT OUTREACH (OUTPATIENT)
Dept: CARE COORDINATION | Facility: CLINIC | Age: 24
End: 2024-01-15
Payer: COMMERCIAL

## 2024-01-15 NOTE — PROGRESS NOTES
[facility]/Voicemail    Clinical Data: Care Coordinator Outreach    Outreach Documentation Number of Outreach Attempt   11/10/2023   3:44 PM 1   12/18/2023  10:09 AM 1   12/29/2023   8:22 AM 2   1/15/2024   3:25 PM 3       Left message on patient's voicemail with call back information and requested return call.    Plan: CHW will request CC  chart review to disenroll. Unable to contact via multiple calls and letter sent via [employer].   Care Coordinator will do no further outreaches at this time.    Gardenia Cervantes, NOLAN  [facility]  [phone]

## 2024-01-16 ENCOUNTER — PATIENT OUTREACH (OUTPATIENT)
Dept: CARE COORDINATION | Facility: CLINIC | Age: 24
End: 2024-01-16
Payer: COMMERCIAL

## 2024-01-16 ENCOUNTER — TELEPHONE (OUTPATIENT)
Dept: FAMILY MEDICINE | Facility: CLINIC | Age: 24
End: 2024-01-16
Payer: COMMERCIAL

## 2024-01-16 NOTE — TELEPHONE ENCOUNTER
Called patient.    She would like to see PCP this week, for recheck and gout, and HTN visit.  She cancelled her visit on 1/11/24 with PCP.  She stated that she would rather just see PCP this week for both, and not have 2 visits.    Only same day appointments open.    Last seen for HTN 12/4/24 where medication was changed to Losartan.    Jessica MCGHEE RN  Triage Nurse  [facility]       c/w Bumex 2 mg PO BID and Lisinopril 2.5 mg PO qD and Coreg 25 mg PO BID

## 2024-01-16 NOTE — LETTER
January 30, 2024      Honey Ramirez  [address] [address] [address]        Dear Honey,     We have tried multiple times to reach you but have been unsuccessful. Please call our clinic at phone [phone] as soon as possible to schedule an appointment with Dr. Bran for a follow up on your Blood Pressure.    Sincerely,      [facility] Care Team

## 2024-01-16 NOTE — TELEPHONE ENCOUNTER
Patient was seen for initial RN HTN visit mgmt visit in December and at that time planned to have her follow up visit with Dr. Bran on 1/11/24 as already scheduled.   I see she cancelled that visit.    Needs to have follow up RN HTN mgmt visit or visit with PCP.   She was taken off lisinopril due to cough and started on losartan at the 12/4/23 visit.    Routed to RN team to reach out to patient to schedule 30 min RN HTN mgmt follow up visit.    Fior ROMO RN  [facility] Triage

## 2024-01-16 NOTE — LETTER
[facility]ORD[facility]  [address]  [place] [address]   January 16, 2024    Honey Ramirez  [address] [address] [address]      Dear Honey,    I have been unsuccessful in reaching you since our last contact. At this time the Care Coordination team will make no further attempts to reach you, however this does not change your ability to continue receiving care from your providers at your primary care clinic. If you need additional support from a care coordinator in the future please contact Gail Monroy at [phone]    All of us at [facility] are invested in your health and are here to assist you in meeting your goals.     Sincerely,    Gail Monroy, LSW, MSW Clinic   [facility]  Care [facility]   [email or web address]  Office: [phone]  Employed by [facility]

## 2024-01-16 NOTE — PROGRESS NOTES
Clinic Care Coordination Contact    Situation: Patient chart reviewed by care coordinator.    Background: Patient is enrolled     Assessment: CHW cannot reach patient x, no return response to Unable to Contact letter sent via [employer]     Plan/Recommendations: SW will disenroll, sent disenrollment letter and close to CC.  Will update PCP    Gail Monroy, BUSTER, MSW   [facility]  Care Coordination  [facility]  [phone]  1/16/2024 5:19 PM

## 2024-01-17 NOTE — TELEPHONE ENCOUNTER
May offer next available same day appointment to discuss high blood pressure and gout.  Advise patient that it is important that she is compliant with keeping her appointments.  In the meanwhile, she needs to continue taking her losartan.

## 2024-01-18 NOTE — TELEPHONE ENCOUNTER
Called [phone] (home)   Did they answer the phone: No, left a message on voicemail to return call to the [facility] at [phone], and to ask for any available triage nurse.    Jessica MIGUEL BSN  Triage Nurse  [facility]

## 2024-01-19 NOTE — TELEPHONE ENCOUNTER
Called [phone] (home)   Did they answer the phone: No, left a message on voicemail to return call to the [facility] at [phone], and to ask for any available triage nurse.    Jessica MIGUEL BSN  Triage Nurse  [facility]

## 2024-01-23 NOTE — TELEPHONE ENCOUNTER
Called [phone] (home).  Did they answer the phone: No, left a message on voicemail to return call to the [facility] at [phone], and to ask for any available triage nurse.    Jessica MIGUEL BSN  Triage Nurse  [facility]

## 2024-01-25 NOTE — TELEPHONE ENCOUNTER
[employer] message sent to patient.  Will make sure that she reads it.    Jessica GUZMANN RN  Triage Nurse  [facility]

## 2024-01-30 NOTE — TELEPHONE ENCOUNTER
[employer] message has not been read yet.    3 calls have been made without response.  Routed to TC team to send letter, patient needs office visit with provider for BP follow up.    Fior ROMO RN  [facility] Triage

## 2024-02-05 ENCOUNTER — TELEPHONE (OUTPATIENT)
Dept: FAMILY MEDICINE | Facility: CLINIC | Age: 24
End: 2024-02-05
Payer: COMMERCIAL

## 2024-02-13 ASSESSMENT — ASTHMA QUESTIONNAIRES
QUESTION_2 LAST FOUR WEEKS HOW OFTEN HAVE YOU HAD SHORTNESS OF BREATH: MORE THAN ONCE A DAY
ACT_TOTALSCORE: 17
QUESTION_3 LAST FOUR WEEKS HOW OFTEN DID YOUR ASTHMA SYMPTOMS (WHEEZING, COUGHING, SHORTNESS OF BREATH, CHEST TIGHTNESS OR PAIN) WAKE YOU UP AT NIGHT OR EARLIER THAN USUAL IN THE MORNING: ONCE OR TWICE
QUESTION_5 LAST FOUR WEEKS HOW WOULD YOU RATE YOUR ASTHMA CONTROL: WELL CONTROLLED
QUESTION_1 LAST FOUR WEEKS HOW MUCH OF THE TIME DID YOUR ASTHMA KEEP YOU FROM GETTING AS MUCH DONE AT WORK, SCHOOL OR AT HOME: A LITTLE OF THE TIME
ACT_TOTALSCORE: 17
QUESTION_4 LAST FOUR WEEKS HOW OFTEN HAVE YOU USED YOUR RESCUE INHALER OR NEBULIZER MEDICATION (SUCH AS ALBUTEROL): ONCE A WEEK OR LESS

## 2024-02-20 ENCOUNTER — OFFICE VISIT (OUTPATIENT)
Dept: FAMILY MEDICINE | Facility: CLINIC | Age: 24
End: 2024-02-20
Attending: FAMILY MEDICINE
Payer: COMMERCIAL

## 2024-02-20 VITALS
BODY MASS INDEX: 39.95 KG/M2 | HEIGHT: 64 IN | WEIGHT: 234 LBS | OXYGEN SATURATION: 97 % | DIASTOLIC BLOOD PRESSURE: 98 MMHG | RESPIRATION RATE: 16 BRPM | HEART RATE: 76 BPM | TEMPERATURE: 97.2 F | SYSTOLIC BLOOD PRESSURE: 152 MMHG

## 2024-02-20 DIAGNOSIS — E66.813 CLASS 3 SEVERE OBESITY DUE TO EXCESS CALORIES WITH SERIOUS COMORBIDITY AND BODY MASS INDEX (BMI) OF 40.0 TO 44.9 IN ADULT (H): ICD-10-CM

## 2024-02-20 DIAGNOSIS — Z86.32 HISTORY OF GESTATIONAL DIABETES MELLITUS (GDM): ICD-10-CM

## 2024-02-20 DIAGNOSIS — I10 HYPERTENSION GOAL BP (BLOOD PRESSURE) < 140/90: Primary | ICD-10-CM

## 2024-02-20 DIAGNOSIS — E66.01 CLASS 3 SEVERE OBESITY DUE TO EXCESS CALORIES WITH SERIOUS COMORBIDITY AND BODY MASS INDEX (BMI) OF 40.0 TO 44.9 IN ADULT (H): ICD-10-CM

## 2024-02-20 DIAGNOSIS — M10.00 IDIOPATHIC GOUT, UNSPECIFIED CHRONICITY, UNSPECIFIED SITE: ICD-10-CM

## 2024-02-20 DIAGNOSIS — F33.1 MODERATE EPISODE OF RECURRENT MAJOR DEPRESSIVE DISORDER (H): ICD-10-CM

## 2024-02-20 PROBLEM — O24.419 GESTATIONAL DIABETES MELLITUS (GDM) IN THIRD TRIMESTER, GESTATIONAL DIABETES METHOD OF CONTROL UNSPECIFIED: Status: RESOLVED | Noted: 2020-05-22 | Resolved: 2024-02-20

## 2024-02-20 LAB — HBA1C MFR BLD: 5.8 % (ref 0–5.6)

## 2024-02-20 PROCEDURE — 99214 OFFICE O/P EST MOD 30 MIN: CPT | Performed by: FAMILY MEDICINE

## 2024-02-20 PROCEDURE — 83036 HEMOGLOBIN GLYCOSYLATED A1C: CPT | Performed by: FAMILY MEDICINE

## 2024-02-20 PROCEDURE — 80048 BASIC METABOLIC PNL TOTAL CA: CPT | Performed by: FAMILY MEDICINE

## 2024-02-20 PROCEDURE — 36415 COLL VENOUS BLD VENIPUNCTURE: CPT | Performed by: FAMILY MEDICINE

## 2024-02-20 PROCEDURE — 84550 ASSAY OF BLOOD/URIC ACID: CPT | Performed by: FAMILY MEDICINE

## 2024-02-20 RX ORDER — INDOMETHACIN 50 MG/1
50 CAPSULE ORAL 2 TIMES DAILY WITH MEALS
Qty: 90 CAPSULE | Refills: 1 | Status: SHIPPED | OUTPATIENT
Start: 2024-02-20 | End: 2024-05-16

## 2024-02-20 RX ORDER — ALLOPURINOL 100 MG/1
TABLET ORAL
Qty: 111 TABLET | Refills: 0 | Status: SHIPPED | OUTPATIENT
Start: 2024-02-20 | End: 2024-03-25

## 2024-02-20 RX ORDER — LOSARTAN POTASSIUM 25 MG/1
25 TABLET ORAL DAILY
Qty: 30 TABLET | Refills: 1 | Status: SHIPPED | OUTPATIENT
Start: 2024-02-20 | End: 2024-05-21

## 2024-02-20 ASSESSMENT — PATIENT HEALTH QUESTIONNAIRE - PHQ9
SUM OF ALL RESPONSES TO PHQ QUESTIONS 1-9: 14
10. IF YOU CHECKED OFF ANY PROBLEMS, HOW DIFFICULT HAVE THESE PROBLEMS MADE IT FOR YOU TO DO YOUR WORK, TAKE CARE OF THINGS AT HOME, OR GET ALONG WITH OTHER PEOPLE: VERY DIFFICULT
SUM OF ALL RESPONSES TO PHQ QUESTIONS 1-9: 14

## 2024-02-20 ASSESSMENT — PAIN SCALES - GENERAL: PAINLEVEL: NO PAIN (0)
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
## 2024-02-20 NOTE — PATIENT INSTRUCTIONS
Start losartan 25 mg daily then schedule pharmacy bp check in 2 weeks and bring home cuff for validation    After 1 week on losartan (with no side effects) start allopurinol taper - follow label instructions.    Use indomethacin for flares; we expect you to get worse before you get better.

## 2024-02-21 PROBLEM — R73.03 PREDIABETES: Status: ACTIVE | Noted: 2024-02-21

## 2024-02-21 LAB
ANION GAP SERPL CALCULATED.3IONS-SCNC: 13 MMOL/L (ref 7–15)
BUN SERPL-MCNC: 12 MG/DL (ref 6–20)
CALCIUM SERPL-MCNC: 9.5 MG/DL (ref 8.6–10)
CHLORIDE SERPL-SCNC: 105 MMOL/L (ref 98–107)
CREAT SERPL-MCNC: 0.72 MG/DL (ref 0.51–0.95)
DEPRECATED HCO3 PLAS-SCNC: 23 MMOL/L (ref 22–29)
EGFRCR SERPLBLD CKD-EPI 2021: >90 ML/MIN/1.73M2
GLUCOSE SERPL-MCNC: 116 MG/DL (ref 70–99)
POTASSIUM SERPL-SCNC: 3.8 MMOL/L (ref 3.4–5.3)
SODIUM SERPL-SCNC: 141 MMOL/L (ref 135–145)
URATE SERPL-MCNC: 11.2 MG/DL (ref 2.4–5.7)

## 2024-03-04 ENCOUNTER — MYC MEDICAL ADVICE (OUTPATIENT)
Dept: FAMILY MEDICINE | Facility: CLINIC | Age: 24
End: 2024-03-04
Payer: COMMERCIAL

## 2024-03-06 ENCOUNTER — MYC MEDICAL ADVICE (OUTPATIENT)
Dept: FAMILY MEDICINE | Facility: CLINIC | Age: 24
End: 2024-03-06
Payer: COMMERCIAL

## 2024-03-08 ENCOUNTER — MYC MEDICAL ADVICE (OUTPATIENT)
Dept: FAMILY MEDICINE | Facility: CLINIC | Age: 24
End: 2024-03-08
Payer: COMMERCIAL

## 2024-03-22 ENCOUNTER — NURSE TRIAGE (OUTPATIENT)
Dept: FAMILY MEDICINE | Facility: CLINIC | Age: 24
End: 2024-03-22
Payer: COMMERCIAL

## 2024-03-22 ENCOUNTER — MYC MEDICAL ADVICE (OUTPATIENT)
Dept: FAMILY MEDICINE | Facility: CLINIC | Age: 24
End: 2024-03-22
Payer: COMMERCIAL

## 2024-03-22 NOTE — TELEPHONE ENCOUNTER
"RN contacted pt regarding HemoShearhart message below. RN triaged pt sx and advised pt should be evaluated in hospital ED now. RN advised pt not to drive self and call 911 if any new or worsening sx, or if another  is not available. Pt states her sister will drive her to ED. RN advised if any new or worsening sx, she should call 911 and go by personal vehicle. Pt indicates understanding of issues and states her sister is not with her now, but states her sister will come get her now.    See 3/22/24 HemoShearhart encounter:    \"Dio Infante, I was curious to see if i need an appointment for my chest pain i've been having. I started having chest pains last tuesday and I don't really know why but it's consistent and it hurts to breathe at times but it's not for long. It doesn't affect my day but it's uncomfortable. Please let me know! thank you.\"    Reason for Disposition   Pain also in shoulder(s) or arm(s) or jaw    Additional Information   Negative: Followed an injury to chest   Negative: Sounds like a life-threatening emergency to the triager   Negative: Heart beating < 50 beats per minute OR > 140 beats per minute   Negative: Visible sweat on face or sweat dripping down face   Negative: Shock suspected (e.g., cold/pale/clammy skin, too weak to stand, low BP, rapid pulse)   Negative: Passed out (i.e., lost consciousness, collapsed and was not responding)   Negative: Chest pain lasting longer than 5 minutes and ANY of the following:         Pain is crushing, pressure-like, or heavy         Over 44 years old          Over 30 years old and one cardiac risk factor (e.g diabetes, high blood pressure, high cholesterol, smoker, or family history of heart disease)         History of heart disease (e.g. angina, heart attack, heart failure, bypass surgery, takes nitroglycerin)   Negative: SEVERE difficulty breathing (e.g., struggling for each breath, speaks in single words)   Negative: Difficult to awaken or acting confused (e.g., " "disoriented, slurred speech)   Negative: SEVERE chest pain    Answer Assessment - Initial Assessment Questions  1. LOCATION: \"Where does it hurt?\"        In center of chest over sternum and radiates throughout the chest and into back.    2. RADIATION: \"Does the pain go anywhere else?\" (e.g., into neck, jaw, arms, back)      Radiates to back between shoulder blades currently.   Mild intermittent abdominal pain that feels like a bruise that, \"doesn't really affect me during the day.\"  DENIES pain into jaw, neck, or arms.    3. ONSET: \"When did the chest pain begin?\" (Minutes, hours or days)       Tuesday 3/12/24. No injury associated with onset of sx. Pt states he sx started the same day her mother went to hospital for a stroke.    4. PATTERN: \"Does the pain come and go, or has it been constant since it started?\"  \"Does it get worse with exertion?\"       Pt states the pain has been, \"very constant,\" since onset.    5. DURATION: \"How long does it last\" (e.g., seconds, minutes, hours)      Constant    6. SEVERITY: \"How bad is the pain?\"  (e.g., Scale 1-10; mild, moderate, or severe)     - MILD (1-3): doesn't interfere with normal activities      - MODERATE (4-7): interferes with normal activities or awakens from sleep     - SEVERE (8-10): excruciating pain, unable to do any normal activities        Has \"a little bit,\" of pain right now. Pain #4/10.    7. CARDIAC RISK FACTORS: \"Do you have any history of heart problems or risk factors for heart disease?\" (e.g., angina, prior heart attack; diabetes, high blood pressure, high cholesterol, smoker, or strong family history of heart disease)      HTN on medications, obesity    8. PULMONARY RISK FACTORS: \"Do you have any history of lung disease?\"  (e.g., blood clots in lung, asthma, emphysema, birth control pills)      There is pain when taking a deep breath sometimes.    9. CAUSE: \"What do you think is causing the chest pain?\"      Not asked.    10. OTHER SYMPTOMS: \"Do you " "have any other symptoms?\" (e.g., dizziness, nausea, vomiting, sweating, fever, difficulty breathing, cough)        DENIES: nausea, vomiting, or fever.  States she has an inconsistent, intermittent cough, and states when she is standing in one place or standing she gets waves of dizziness like she is going to pass out, but hasn't passed out. States this sx started 4 days ago.    11. PREGNANCY: \"Is there any chance you are pregnant?\" \"When was your last menstrual period?\"        No    Protocols used: Chest Pain-A-OH    THOMAS DuranN, RN    "

## 2024-03-23 DIAGNOSIS — M10.00 IDIOPATHIC GOUT, UNSPECIFIED CHRONICITY, UNSPECIFIED SITE: ICD-10-CM

## 2024-03-25 RX ORDER — ALLOPURINOL 100 MG/1
200 TABLET ORAL DAILY
Qty: 180 TABLET | Refills: 1 | Status: SHIPPED | OUTPATIENT
Start: 2024-03-25 | End: 2024-05-21

## 2024-03-25 NOTE — TELEPHONE ENCOUNTER
Pt returning call. She says she takes 100mg daily. She increases when she is having a flair up, but takes the 100mg regularly.     Routing back to provider with above information.     Thank you - Nicolasa Richards, THOMASN, RN

## 2024-03-25 NOTE — TELEPHONE ENCOUNTER
Pt should not increase when having a flare, if having flairs pt needs to increase dose to 200 mg daily.  Increasing the dose may trigger a flare and she should continue on the higher dose to see if that prevents future flairs.    Indomethacin is what she should use for flares.    Sending order for 100 mg tablets, 2 tabs daily.

## 2024-03-25 NOTE — TELEPHONE ENCOUNTER
This writer attempted to contact Honey on 03/25/24      Reason for call per provider, clarify current dose of Allopurinol for refill request  and left message.      If patient calls back:   Registered Nurse called. Send to RN team at [facility].        Bridgette Canales RN  Clinical Triage/Primary Care  [facility]

## 2024-03-25 NOTE — TELEPHONE ENCOUNTER
Patient notified of provider's message as written below. Patient verbalized good understanding, had no further questions and needed no further support.Norma Jack R.N.

## 2024-04-24 ENCOUNTER — TELEPHONE (OUTPATIENT)
Dept: FAMILY MEDICINE | Facility: CLINIC | Age: 24
End: 2024-04-24
Payer: COMMERCIAL

## 2024-04-24 NOTE — TELEPHONE ENCOUNTER
Patient calling clinic back per request regarding migraine headache. Patient reports occasionally has a migraine, not often, and needs to be in a dark room. Patient reports vomiting. Patient is feeling improved and will wait for appointment with PCP on 05/21/24. Offered sooner appointment and patient declines at this time.    Future Appointments 4/24/2024 - 10/21/2024        Date Visit Type Length Department Provider     5/21/2024 11:25 AM MYC PREVENTATIVE ADULT VISIT 30 min [facility] Arelis Mera MD    Location Instructions:     [facility] is located at [address] [place] [address], just north of the intersection with [facility]. The patient parking lot and entrance is on the building s north side.                     Katt Ralph RN

## 2024-04-24 NOTE — TELEPHONE ENCOUNTER
This encounter is being created due to the [employer] message dated 4/24/2024 as written below:     I left a message to return a call to [phone].  Norma Jack R.N.    RN:  Whomever speaks with Honey please triage her headaches as she never returned a call yesterday as requested of her via "[employer].  Thank you. Norma Jack R.N.

## 2024-05-16 DIAGNOSIS — M10.00 IDIOPATHIC GOUT, UNSPECIFIED CHRONICITY, UNSPECIFIED SITE: ICD-10-CM

## 2024-05-16 RX ORDER — INDOMETHACIN 50 MG/1
50 CAPSULE ORAL 2 TIMES DAILY WITH MEALS
Qty: 90 CAPSULE | Refills: 0 | Status: SHIPPED | OUTPATIENT
Start: 2024-05-16

## 2024-05-18 SDOH — HEALTH STABILITY: PHYSICAL HEALTH: ON AVERAGE, HOW MANY MINUTES DO YOU ENGAGE IN EXERCISE AT THIS LEVEL?: 30 MIN

## 2024-05-18 SDOH — HEALTH STABILITY: PHYSICAL HEALTH: ON AVERAGE, HOW MANY DAYS PER WEEK DO YOU ENGAGE IN MODERATE TO STRENUOUS EXERCISE (LIKE A BRISK WALK)?: 1 DAY

## 2024-05-18 ASSESSMENT — SOCIAL DETERMINANTS OF HEALTH (SDOH): HOW OFTEN DO YOU GET TOGETHER WITH FRIENDS OR RELATIVES?: MORE THAN THREE TIMES A WEEK

## 2024-05-21 ENCOUNTER — OFFICE VISIT (OUTPATIENT)
Dept: FAMILY MEDICINE | Facility: CLINIC | Age: 24
End: 2024-05-21
Attending: FAMILY MEDICINE
Payer: COMMERCIAL

## 2024-05-21 ENCOUNTER — TELEPHONE (OUTPATIENT)
Dept: FAMILY MEDICINE | Facility: CLINIC | Age: 24
End: 2024-05-21

## 2024-05-21 VITALS
TEMPERATURE: 98.2 F | RESPIRATION RATE: 20 BRPM | DIASTOLIC BLOOD PRESSURE: 108 MMHG | HEIGHT: 64 IN | BODY MASS INDEX: 42.2 KG/M2 | SYSTOLIC BLOOD PRESSURE: 130 MMHG | OXYGEN SATURATION: 97 % | WEIGHT: 247.2 LBS | HEART RATE: 78 BPM

## 2024-05-21 DIAGNOSIS — I10 HYPERTENSION GOAL BP (BLOOD PRESSURE) < 140/90: ICD-10-CM

## 2024-05-21 DIAGNOSIS — E66.813 CLASS 3 DRUG-INDUCED OBESITY WITH SERIOUS COMORBIDITY AND BODY MASS INDEX (BMI) OF 40.0 TO 44.9 IN ADULT (H): ICD-10-CM

## 2024-05-21 DIAGNOSIS — G43.709 CHRONIC MIGRAINE WITHOUT AURA WITHOUT STATUS MIGRAINOSUS, NOT INTRACTABLE: ICD-10-CM

## 2024-05-21 DIAGNOSIS — R63.5 WEIGHT GAIN: ICD-10-CM

## 2024-05-21 DIAGNOSIS — F41.9 ANXIETY: ICD-10-CM

## 2024-05-21 DIAGNOSIS — M10.00 IDIOPATHIC GOUT, UNSPECIFIED CHRONICITY, UNSPECIFIED SITE: ICD-10-CM

## 2024-05-21 DIAGNOSIS — F33.1 MODERATE EPISODE OF RECURRENT MAJOR DEPRESSIVE DISORDER (H): ICD-10-CM

## 2024-05-21 DIAGNOSIS — J45.30 MILD PERSISTENT ASTHMA WITHOUT COMPLICATION: ICD-10-CM

## 2024-05-21 DIAGNOSIS — Z00.00 ROUTINE GENERAL MEDICAL EXAMINATION AT A HEALTH CARE FACILITY: Primary | ICD-10-CM

## 2024-05-21 DIAGNOSIS — R73.03 PREDIABETES: ICD-10-CM

## 2024-05-21 DIAGNOSIS — E66.1 CLASS 3 DRUG-INDUCED OBESITY WITH SERIOUS COMORBIDITY AND BODY MASS INDEX (BMI) OF 40.0 TO 44.9 IN ADULT (H): ICD-10-CM

## 2024-05-21 PROBLEM — E66.01 MORBID OBESITY (H): Status: RESOLVED | Noted: 2020-09-30 | Resolved: 2024-05-21

## 2024-05-21 PROBLEM — J45.20 ASTHMA, INTERMITTENT: Status: RESOLVED | Noted: 2019-06-10 | Resolved: 2024-05-21

## 2024-05-21 LAB — HBA1C MFR BLD: 6 % (ref 0–5.6)

## 2024-05-21 PROCEDURE — G0145 SCR C/V CYTO,THINLAYER,RESCR: HCPCS | Performed by: FAMILY MEDICINE

## 2024-05-21 PROCEDURE — 99214 OFFICE O/P EST MOD 30 MIN: CPT | Mod: 25 | Performed by: FAMILY MEDICINE

## 2024-05-21 PROCEDURE — 83036 HEMOGLOBIN GLYCOSYLATED A1C: CPT | Performed by: FAMILY MEDICINE

## 2024-05-21 PROCEDURE — 80061 LIPID PANEL: CPT | Performed by: FAMILY MEDICINE

## 2024-05-21 PROCEDURE — 99395 PREV VISIT EST AGE 18-39: CPT | Performed by: FAMILY MEDICINE

## 2024-05-21 PROCEDURE — 80048 BASIC METABOLIC PNL TOTAL CA: CPT | Performed by: FAMILY MEDICINE

## 2024-05-21 PROCEDURE — 84550 ASSAY OF BLOOD/URIC ACID: CPT | Performed by: FAMILY MEDICINE

## 2024-05-21 PROCEDURE — 84443 ASSAY THYROID STIM HORMONE: CPT | Performed by: FAMILY MEDICINE

## 2024-05-21 PROCEDURE — 36415 COLL VENOUS BLD VENIPUNCTURE: CPT | Performed by: FAMILY MEDICINE

## 2024-05-21 RX ORDER — ALLOPURINOL 300 MG/1
300 TABLET ORAL DAILY
Qty: 90 TABLET | Refills: 1 | Status: SHIPPED | OUTPATIENT
Start: 2024-05-21 | End: 2024-08-21

## 2024-05-21 RX ORDER — LOSARTAN POTASSIUM 50 MG/1
50 TABLET ORAL DAILY
Qty: 90 TABLET | Refills: 1 | Status: SHIPPED | OUTPATIENT
Start: 2024-05-21 | End: 2024-08-21

## 2024-05-21 RX ORDER — ESCITALOPRAM OXALATE 5 MG/1
5 TABLET ORAL DAILY
Qty: 30 TABLET | Refills: 1 | Status: SHIPPED | OUTPATIENT
Start: 2024-05-21 | End: 2024-08-05

## 2024-05-21 RX ORDER — COLCHICINE 0.6 MG/1
TABLET ORAL
Qty: 90 TABLET | Refills: 1 | Status: SHIPPED | OUTPATIENT
Start: 2024-05-21

## 2024-05-21 RX ORDER — TOPIRAMATE 25 MG/1
TABLET, FILM COATED ORAL
Qty: 150 TABLET | Refills: 0 | Status: SHIPPED | OUTPATIENT
Start: 2024-05-21 | End: 2024-07-05

## 2024-05-21 ASSESSMENT — ASTHMA QUESTIONNAIRES
QUESTION_3 LAST FOUR WEEKS HOW OFTEN DID YOUR ASTHMA SYMPTOMS (WHEEZING, COUGHING, SHORTNESS OF BREATH, CHEST TIGHTNESS OR PAIN) WAKE YOU UP AT NIGHT OR EARLIER THAN USUAL IN THE MORNING: TWO OR THREE NIGHTS A WEEK
ACT_TOTALSCORE: 12
ACT_TOTALSCORE: 12
QUESTION_1 LAST FOUR WEEKS HOW MUCH OF THE TIME DID YOUR ASTHMA KEEP YOU FROM GETTING AS MUCH DONE AT WORK, SCHOOL OR AT HOME: MOST OF THE TIME
QUESTION_2 LAST FOUR WEEKS HOW OFTEN HAVE YOU HAD SHORTNESS OF BREATH: MORE THAN ONCE A DAY
QUESTION_4 LAST FOUR WEEKS HOW OFTEN HAVE YOU USED YOUR RESCUE INHALER OR NEBULIZER MEDICATION (SUCH AS ALBUTEROL): TWO OR THREE TIMES PER WEEK
QUESTION_5 LAST FOUR WEEKS HOW WOULD YOU RATE YOUR ASTHMA CONTROL: WELL CONTROLLED

## 2024-05-21 ASSESSMENT — ANXIETY QUESTIONNAIRES
6. BECOMING EASILY ANNOYED OR IRRITABLE: NEARLY EVERY DAY
GAD7 TOTAL SCORE: 20
5. BEING SO RESTLESS THAT IT IS HARD TO SIT STILL: NEARLY EVERY DAY
1. FEELING NERVOUS, ANXIOUS, OR ON EDGE: MORE THAN HALF THE DAYS
2. NOT BEING ABLE TO STOP OR CONTROL WORRYING: NEARLY EVERY DAY
IF YOU CHECKED OFF ANY PROBLEMS ON THIS QUESTIONNAIRE, HOW DIFFICULT HAVE THESE PROBLEMS MADE IT FOR YOU TO DO YOUR WORK, TAKE CARE OF THINGS AT HOME, OR GET ALONG WITH OTHER PEOPLE: EXTREMELY DIFFICULT
3. WORRYING TOO MUCH ABOUT DIFFERENT THINGS: NEARLY EVERY DAY
7. FEELING AFRAID AS IF SOMETHING AWFUL MIGHT HAPPEN: NEARLY EVERY DAY
GAD7 TOTAL SCORE: 20

## 2024-05-21 ASSESSMENT — PATIENT HEALTH QUESTIONNAIRE - PHQ9
SUM OF ALL RESPONSES TO PHQ QUESTIONS 1-9: 11
5. POOR APPETITE OR OVEREATING: NEARLY EVERY DAY

## 2024-05-21 ASSESSMENT — PAIN SCALES - GENERAL: PAINLEVEL: NO PAIN (0)

## 2024-05-21 NOTE — TELEPHONE ENCOUNTER
PRIOR AUTHORIZATION DENIED    Medication: NURTEC 75 MG PO TBDP  Insurance Company: [employer] - Phone [phone] Fax [phone]  Denial Date: 5/21/2024  Denial Reason(s): PT MUST TRY/FAIL UBRELVY    Appeal Information:     Patient Notified: NO

## 2024-05-21 NOTE — PATIENT INSTRUCTIONS
"Contact [facility] for Walter.  [facility]  Directions   Website  Address: [address] [place] [address] [address] [facility] [address], [place] [address]   Phone: [phone]    [facility]  [address] [place] [id number] [phone]  Appointments: [phone]    [facility]  Directions   Website  Address: [address] , [place] [address]   Phone: [phone]     Increase allopurinol and use colchicine for acute flares.  Increase losartan.    Then start escitalopram, if tolerating well after 1 week start topiramate taper.  Once up to 100 mg daily for topiramate, start the inhaler.    Patient Education    Preventive Care Advice   This is general advice we often give to help people stay healthy. Your care team may have specific advice just for you. Please talk to your care team about your own preventive care needs.  Lifestyle  Exercise at least 150 minutes each week (30 minutes a day, 5 days a week).  Do muscle strengthening activities 2 days a week. These help control your weight and prevent disease.  No smoking.  Wear sunscreen to prevent skin cancer.  Have your home tested for radon every 2 to 5 years. Radon is a colorless, odorless gas that can harm your lungs. To learn more, go to www.health.[facility].[place].us and search for \"Radon in Homes.\"  Keep guns unloaded and locked up in a safe place like a safe or gun vault, or, use a gun lock and hide the keys. Always lock away bullets separately. To learn more, visit [employer].[place].gov and search for \"safe gun storage.\"  Nutrition  Eat 5 or more servings of fruits and vegetables each day.  Try wheat bread, brown rice and whole grain pasta (instead of white bread, rice, and pasta).  Get enough calcium and vitamin D. Check the label on foods and aim for 100% of the RDA (recommended daily allowance).  Regular exams  Have a dental exam and cleaning every 6 months.  See your health care team every year to talk about:  Any changes in your " health.  Any medicines your care team has prescribed.  Preventive care, family planning, and ways to prevent chronic diseases.  Shots (vaccines)   HPV shots (up to age 26), if you've never had them before.  Hepatitis B shots (up to age 59), if you've never had them before.  COVID-19 shot: Get this shot when it's due.  Flu shot: Get a flu shot every year.  Tetanus shot: Get a tetanus shot every 10 years.  Pneumococcal, hepatitis A, and RSV shots: Ask your care team if you need these based on your risk.  Shingles shot (for age 50 and up).  General health tests  Diabetes screening:  Starting at age 35, Get screened for diabetes at least every 3 years.  If you are younger than age 35, ask your care team if you should be screened for diabetes.  Cholesterol test: At age 39, start having a cholesterol test every 5 years, or more often if advised.  Bone density scan (DEXA): At age 50, ask your care team if you should have this scan for osteoporosis (brittle bones).  Hepatitis C: Get tested at least once in your life.  Abdominal aortic aneurysm screening: Talk to your doctor about having this screening if you:  Have ever smoked; and  Are biologically male; and  Are between the ages of 65 and 75.  STIs (sexually transmitted infections)  Before age 24: Ask your care team if you should be screened for STIs.  After age 24: Get screened for STIs if you're at risk. You are at risk for STIs (including HIV) if:  You are sexually active with more than one person.  You don't use condoms every time.  You or a partner was diagnosed with a sexually transmitted infection.  If you are at risk for HIV, ask about PrEP medicine to prevent HIV.  Get tested for HIV at least once in your life, whether you are at risk for HIV or not.  Cancer screening tests  Cervical cancer screening: If you have a cervix, begin getting regular cervical cancer screening tests at age 21. Most people who have regular screenings with normal results can stop after  "age 65. Talk about this with your provider.  Breast cancer scan (mammogram): If you've ever had breasts, begin having regular mammograms starting at age 40. This is a scan to check for breast cancer.  Colon cancer screening: It is important to start screening for colon cancer at age 45.  Have a colonoscopy test every 10 years (or more often if you're at risk) Or, ask your provider about stool tests like a FIT test every year or Cologuard test every 3 years.  To learn more about your testing options, visit: www.Starline/652152.pdf.  For help making a decision, visit: [email or web address].  Prostate cancer screening test: If you have a prostate and are age 55 to 69, ask your provider if you would benefit from a yearly prostate cancer screening test.  Lung cancer screening: If you are a current or former smoker age 50 to 80, ask your care team if ongoing lung cancer screenings are right for you.  For informational purposes only. Not to replace the advice of your health care provider. Copyright   2023 [facility] [employer]. All rights reserved. Clinically reviewed by the [facility] Transitions Program. [employer] [id number] - REV 04/24.    Where can you learn more?  Go to https://www.Doodle.net/patiented  Enter N032 in the search box to learn more about \"Learning About Stress.\"  Current as of: October 24, 2023               Content Version: 14.0 2006-2024 Healthwise, [employer].   Care instructions adapted under license by your healthcare professional. If you have questions about a medical condition or this instruction, always ask your healthcare professional. [employer] disclaims any warranty or liability for your use of this information.    Go to https://www.Doodle.net/patiented  Enter H573 in the search box to learn more about \"Substance Use Disorder: Care Instructions.\"  Current as of: November 15, 2023               Content Version: 14.0 2006-2024 Healthwise, [employer].   Care " instructions adapted under license by your healthcare professional. If you have questions about a medical condition or this instruction, always ask your healthcare professional. Healthwise, Inc[employer] disclaims any warranty or liability for your use of this information.

## 2024-05-21 NOTE — LETTER
My Asthma Action Plan    Name: Honey Ramirez   YOB: 2000  Date: 5/21/2024   My doctor: Arelis Mera MD   My clinic: [facility]        My Control Medicine: Fluticasone furoate (Arnuity Ellipta) -  100 mcg daily  My Rescue Medicine: Albuterol (Proair/Ventolin/Proventil HFA) 2-4 puffs EVERY 4 HOURS as needed. Use a spacer if recommended by your provider.   My Asthma Severity:   Mild Persistent  Know your asthma triggers: upper respiratory infections and pollens               GREEN ZONE   Good Control  I feel good  No cough or wheeze  Can work, sleep and play without asthma symptoms       Take your asthma control medicine every day.     If exercise triggers your asthma, take your rescue medication  15 minutes before exercise or sports, and  During exercise if you have asthma symptoms  Spacer to use with inhaler: If you have a spacer, make sure to use it with your inhaler             YELLOW ZONE Getting Worse  I have ANY of these:  I do not feel good  Cough or wheeze  Chest feels tight  Wake up at night   Keep taking your Green Zone medications  Start taking your rescue medicine:  every 20 minutes for up to 1 hour. Then every 4 hours for 24-48 hours.  If you stay in the Yellow Zone for more than 12-24 hours, contact your doctor.  If you do not return to the Green Zone in 12-24 hours or you get worse, start taking your oral steroid medicine if prescribed by your provider.           RED ZONE Medical Alert - Get Help  I have ANY of these:  I feel awful  Medicine is not helping  Breathing getting harder  Trouble walking or talking  Nose opens wide to breathe       Take your rescue medicine NOW  If your provider has prescribed an oral steroid medicine, start taking it NOW  Call your doctor NOW  If you are still in the Red Zone after 20 minutes and you have not reached your doctor:  Take your rescue medicine again and  Call 911 or go to the emergency room right away    See your  regular doctor within 2 weeks of an Emergency Room or Urgent Care visit for follow-up treatment.          Annual Reminders:  Meet with Asthma Educator,  Flu Shot in the Fall, consider Pneumonia Vaccination for patients with asthma (aged 19 and older).    Pharmacy:    [facility] [address] [place], [place] - [address] [place], [place] - [address] [address]    Electronically signed by Arelis Mera MD   Date: 05/21/24                      Asthma Triggers  How To Control Things That Make Your Asthma Worse    Triggers are things that make your asthma worse.  Look at the list below to help you find your triggers and what you can do about them.  You can help prevent asthma flare-ups by staying away from your triggers.      Trigger                                                          What you can do   Cigarette Smoke  Tobacco smoke can make asthma worse. Do not allow smoking in your home, car or around you.  Be sure no one smokes at a child s day care or school.  If you smoke, ask your health care provider for ways to help you quit.  Ask family members to quit too.  Ask your health care provider for a referral to Quit Plan to help you quit smoking, or call 8-332-241-PLAN.     Colds, Flu, Bronchitis  These are common triggers of asthma. Wash your hands often.  Don t touch your eyes, nose or mouth.  Get a flu shot every year.     Dust Mites  These are tiny bugs that live in cloth or carpet. They are too small to see. Wash sheets and blankets in hot water every week.   Encase pillows and mattress in dust mite proof covers.  Avoid having carpet if you can. If you have carpet, vacuum weekly.   Use a dust mask and HEPA vacuum.   Pollen and Outdoor Mold  Some people are allergic to trees, grass, or weed pollen, or molds. Try to keep your windows closed.  Limit time out doors when pollen count is high.   Ask you health care provider about taking medicine during allergy season.     Animal  Dander  Some people are allergic to skin flakes, urine or saliva from pets with fur or feathers. Keep pets with fur or feathers out of your home.    If you can t keep the pet outdoors, then keep the pet out of your bedroom.  Keep the bedroom door closed.  Keep pets off cloth furniture and away from stuffed toys.     Mice, Rats, and Cockroaches   Some people are allergic to the waste from these pests.   Cover food and garbage.  Clean up spills and food crumbs.  Store grease in the refrigerator.   Keep food out of the bedroom.   Indoor Mold  This can be a trigger if your home has high moisture. Fix leaking faucets, pipes, or other sources of water.   Clean moldy surfaces.  Dehumidify basement if it is damp and smelly.   Smoke, Strong Odors, and Sprays  These can reduce air quality. Stay away from strong odors and sprays, such as perfume, powder, hair spray, paints, smoke incense, paint, cleaning products, candles and new carpet.   Exercise or Sports  Some people with asthma have this trigger. Be active!  Ask your doctor about taking medicine before sports or exercise to prevent symptoms.    Warm up for 5-10 minutes before and after sports or exercise.     Other Triggers of Asthma  Cold air:  Cover your nose and mouth with a scarf.  Sometimes laughing or crying can be a trigger.  Some medicines and food can trigger asthma.

## 2024-05-21 NOTE — LETTER
My Depression Action Plan  Name: Honey Ramirez   Date of Birth 2000  Date: 5/21/2024    My doctor: Arelis Mera   My clinic: [facility]  [address] [facility] [address] [phone]            GREEN    ZONE   Good Control    What it looks like:   Things are going generally well. You have normal ups and downs. You may even feel depressed from time to time, but bad moods usually last less than a day.   What you need to do:  Continue to care for yourself (see self care plan)  Check your depression survival kit and update it as needed  Follow your physician s recommendations including any medication.  Do not stop taking medication unless you consult with your physician first.             YELLOW         ZONE Getting Worse    What it looks like:   Depression is starting to interfere with your life.   It may be hard to get out of bed; you may be starting to isolate yourself from others.  Symptoms of depression are starting to last most all day and this has happened for several days.   You may have suicidal thoughts but they are not constant.   What you need to do:     Call your care team. Your response to treatment will improve if you keep your care team informed of your progress. Yellow periods are signs an adjustment may need to be made.     Continue your self-care.  Just get dressed and ready for the day.  Don't give yourself time to talk yourself out of it.    Talk to someone in your support network.    Open up your Depression Self-Care Plan/Wellness Kit.             RED    ZONE Medical Alert - Get Help    What it looks like:   Depression is seriously interfering with your life.   You may experience these or other symptoms: You can t get out of bed most days, can t work or engage in other necessary activities, you have trouble taking care of basic hygiene, or basic responsibilities, thoughts of suicide or death that will not go away, self-injurious  behavior.     What you need to do:  Call your care team and request a same-day appointment. If they are not available (weekends or after hours) call your local crisis line, emergency room or 911.          Depression Self-Care Plan / Wellness Kit    Many people find that medication and therapy are helpful treatments for managing depression. In addition, making small changes to your everyday life can help to boost your mood and improve your wellbeing. Below are some tips for you to consider. Be sure to talk with your medical provider and/or behavioral health consultant if your symptoms are worsening or not improving.     Sleep   Sleep hygiene  means all of the habits that support good, restful sleep. It includes maintaining a consistent bedtime and wake time, using your bedroom only for sleeping or sex, and keeping the bedroom dark and free of distractions like a computer, smartphone, or television.     Develop a Healthy Routine  Maintain good hygiene. Get out of bed in the morning, make your bed, brush your teeth, take a shower, and get dressed. Don t spend too much time viewing media that makes you feel stressed. Find time to relax each day.    Exercise  Get some form of exercise every day. This will help reduce pain and release endorphins, the  feel good  chemicals in your brain. It can be as simple as just going for a walk or doing some gardening, anything that will get you moving.      Diet  Strive to eat healthy foods, including fruits and vegetables. Drink plenty of water. Avoid excessive sugar, caffeine, alcohol, and other mood-altering substances.     Stay Connected with Others  Stay in touch with friends and family members.    Manage Your Mood  Try deep breathing, massage therapy, biofeedback, or meditation. Take part in fun activities when you can. Try to find something to smile about each day.     Psychotherapy  Be open to working with a therapist if your provider recommends it.     Medication  Be sure to  take your medication as prescribed. Most anti-depressants need to be taken every day. It usually takes several weeks for medications to work. Not all medicines work for all people. It is important to follow-up with your provider to make sure you have a treatment plan that is working for you. Do not stop your medication abruptly without first discussing it with your provider.    Crisis Resources   These hotlines are for both adults and children. They and are open 24 hours a day, 7 days a week unless noted otherwise.    National Suicide Prevention Lifeline   988 or 5-029-910-PBAC (3067)    Crisis Text Line    www.crisistextline.org  Text HOME to 274433 from anywhere in the [place], anytime, about any type of crisis. A live, trained crisis counselor will receive the text and respond quickly.    [place] Lifeline for LGBTQ Youth  A national crisis intervention and suicide lifeline for LGBTQ youth under 25. Provides a safe place to talk without judgement. Call 1-888.190.9911; text START to 985532 or visit www.thetrevorproject.org to talk to a trained counselor.    For [facility] crisis numbers, visit the [facility] website at:  https://[place].gov/dhs/people-we-serve/adults/health-care/mental-health/resources/crisis-contacts.jsp

## 2024-05-21 NOTE — PROGRESS NOTES
Preventive Care Visit  [facility]  Arelis Mera MD, Family Medicine  May 21, 2024      Assessment & Plan     (Z00.00) Routine general medical examination at a health care facility  (primary encounter diagnosis)  Comment: preventive needs reviewed   Plan: Pap Screen Reflex to HPV if ASCUS - Recommended        Age 25 - 29 Years        see orders in Epic.     (I10) Hypertension goal BP (blood pressure) < 140/90  Comment: not controlled, inconsistent taking med  Plan: losartan (COZAAR) 50 MG tablet, Basic metabolic        panel  (Ca, Cl, CO2, Creat, Gluc, K, Na, BUN),         OFFICE/OUTPT VISIT,EST,LEVL V,         Work on taking med regularly. Increase losartan to 50 mg    (G43.709) Chronic migraine without aura without status migrainosus, not intractable  Comment: never on meds other than excedrin  Plan: topiramate (TOPAMAX) 25 MG tablet, rimegepant         (NURTEC) 75 MG ODT tablet, OFFICE/OUTPT         VISIT,EST,LEVL V,         Start topiramate as controller med due to frequency of ha  Trial of nurtec to treat acute ha - cannot be on triptans due to bp     (F33.1) Moderate episode of recurrent major depressive disorder (H)  (F41.9) Anxiety  Comment: not controlled  Plan: escitalopram (LEXAPRO) 5 MG tablet        Start lexapro, taper to effect, MyChart message timed to send in 3 weeks    (M10.00) Idiopathic gout, unspecified chronicity, unspecified site  Comment: not controlled  Plan: allopurinol (ZYLOPRIM) 300 MG tablet,         colchicine (COLCRYS) 0.6 MG tablet, Uric acid,         OFFICE/OUTPT VISIT,EST,LEVL V,         Increase allopurinol to 300 mg daily  Use colchicine for acute attacks over indomethacin    (J45.30) Mild persistent asthma without complication  Comment: not controlled   Plan: fluticasone (ARNUITY ELLIPTA) 100 MCG/ACT         inhaler, OFFICE/OUTPT VISIT,EST,LEVL V,         Start arnuity daily, discussed daily vs rescue, ok to continue to use albuterol if  needed    (R73.03) Prediabetes  Comment: due  Plan: Hemoglobin A1c, OFFICE/OUTPT VISIT,EST,LEVL V            (R63.5) Weight gain  Comment: increased by 15# over 3 months  Plan: TSH with free T4 reflex, OFFICE/OUTPT         VISIT,EST,LEVL V            (E66.1,  Z68.41) Class 3 drug-induced obesity with serious comorbidity and body mass index (BMI) of 40.0 to 44.9 in adult (H)  Comment: trending up  Plan: Lipid panel reflex to direct LDL Fasting, TSH         with free T4 reflex, OFFICE/OUTPT         VISIT,EST,LEVL V        Topiramate for ha may help with weight loss            Counseling  Appropriate preventive services were discussed with this patient, including applicable screening as appropriate for fall prevention, nutrition, physical activity, Tobacco-use cessation, weight loss and cognition.  Checklist reviewing preventive services available has been given to the patient.  Reviewed patient's diet, addressing concerns and/or questions.   She is at risk for lack of exercise and has been provided with information to increase physical activity for the benefit of her well-being.   The patient's PHQ-9 score is consistent with moderate depression. She was provided with information regarding depression.       See Patient Instructions    Mariel Méndez is a 24 year old, presenting for the following:  Physical        5/21/2024    11:07 AM   Additional Questions   Roomed by Srini        Health Care Directive  Patient does not have a Health Care Directive or Living Will: Discussed advance care planning with patient; information given to patient to review.    HPI  Frequent gout attacks despite consistent use of allopurinol 200 mg daily.  Using indomethacin for acute attacks.    Asthma not controlled symptoms almost daily, tries to avoid using inhaler.    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  Son has autism. Was on sertraline in past and had severe nausea from  it.      11/9/2023     2:24 AM 2/20/2024    11:01 AM 5/21/2024    11:58 AM   PHQ   PHQ-9 Total Score 8 14 11   Q9: Thoughts of better off dead/self-harm past 2 weeks Not at all Not at all Not at all          2/2/2021    11:37 AM 4/29/2022     9:59 AM 5/21/2024    11:59 AM   DELICIA-7 SCORE   Total Score 17 (severe anxiety)     Total Score 17 16 20        Migraine ha twice a week, vomiting,no aura, starts with awakening.  Photophobia/phonophobia, takes excedrin migraine/tension which helps occas or goes in a dark room and sleeps it off. No other medications tried    Weight gain of 15 pounds over 3 months.                  5/18/2024   General Health   How would you rate your overall physical health? (!) FAIR   Feel stress (tense, anxious, or unable to sleep) Very much   (!) STRESS CONCERN      5/18/2024   Nutrition   Three or more servings of calcium each day? Yes   Diet: Low salt   How many servings of fruit and vegetables per day? (!) 2-3   How many sweetened beverages each day? (!) 2         5/18/2024   Exercise   Days per week of moderate/strenous exercise 1 day   Average minutes spent exercising at this level 30 min   (!) EXERCISE CONCERN      5/18/2024   Social Factors   Frequency of gathering with friends or relatives More than three times a week   Worry food won't last until get money to buy more Patient declined   Food not last or not have enough money for food? Patient declined   Do you have housing?  Yes   Are you worried about losing your housing? No   Lack of transportation? Patient declined   Unable to get utilities (heat,electricity)? Patient declined         5/18/2024   Dental   Dentist two times every year? Yes         5/18/2024   TB Screening   Were you born outside of the [place]? No         Today's PHQ-9 Score:       5/21/2024    11:58 AM   PHQ-9 SCORE   PHQ-9 Total Score 11           5/18/2024   Substance Use   Alcohol more than 3/day or more than 7/wk No   Do you use any other substances recreationally?  "(!) CANNABIS PRODUCTS     Social History     Tobacco Use    Smoking status: Former     Types: Cigarettes    Smokeless tobacco: Former    Tobacco comments:     Sujata already quit smoking   Vaping Use    Vaping status: Every Day    Substances: Nicotine   Substance Use Topics    Alcohol use: Yes     Comment: Social drinker    Drug use: Not Currently     Types: Marijuana, MDMA (Ecstasy), PCP     Comment: some marijuana             5/18/2024   Breast Cancer Screening   Family history of breast, colon, or ovarian cancer? No / Unknown      Mammogram Screening - Patient under 40 years of age: Routine Mammogram Screening not recommended.     G 1 P 1   Patient's last menstrual period was 05/14/2024.     Fasting: No   Td: tdap 5/2020       Flu: 9/2020      Covid: 11/2023      Shingrix: NA      PPV: done       RSV: NA          Cholesterol: No results found for: \"CHOL\"  No results found for: \"HDL\"  No results found for: \"LDL\"  No results found for: \"TRIG\"  No results found for: \"CHOLHDLRATIO\"      MMG: NA  Dexa:  NA     Flex/colo: NA      Seat Belt: Yes    Sunscreen use: Yes   Calcium Intake: adeq  Health Care Directive: No  Sexually Active: No    Current contraception: none  History of abnormal Pap smear: No  Family history of colon/breast/ovarian cancer: No  Regular self breast exam: No  History of abnormal mammogram: No          5/18/2024   STI Screening   New sexual partner(s) since last STI/HIV test? No     History of abnormal Pap smear: No - age 21-29 PAP every 3 years recommended        11/24/2021    10:29 AM   PAP / HPV   PAP Negative for Intraepithelial Lesion or Malignancy (NILM)            5/18/2024   Contraception/Family Planning   Questions about contraception or family planning No        Reviewed and updated as needed this visit by Provider   Tobacco  Allergies  Meds  Problems  Med Hx  Surg Hx  Fam Hx            Labs reviewed in EPIC  BP Readings from Last 3 Encounters:   05/21/24 (!) 130/108   02/20/24 (!) " 152/98   23 125/86    Wt Readings from Last 3 Encounters:   24 112.1 kg (247 lb 3.2 oz)   24 106.1 kg (234 lb)   23 109.8 kg (242 lb)                  Patient Active Problem List   Diagnosis    Asthma, intermittent    Class 3 obesity due to excess calories without serious comorbidity with body mass index (BMI) of 40.0 to 44.9 in adult    Motion sickness    Amenorrhea, secondary    Morbid obesity (H)    Moderate episode of recurrent major depressive disorder (H)    Herpes simplex type 1 antibody positive    Hypertension goal BP (blood pressure) < 140/90    Prediabetes     Past Surgical History:   Procedure Laterality Date     SECTION  2020       Social History     Tobacco Use    Smoking status: Former     Types: Cigarettes    Smokeless tobacco: Former    Tobacco comments:     Sujata already quit smoking   Substance Use Topics    Alcohol use: Yes     Comment: Social drinker     Family History   Problem Relation Age of Onset    Diabetes Mother     Diabetes Father     Polycystic ovary syndrome Sister     Substance Abuse Brother     Chronic Obstructive Pulmonary Disease Maternal Grandfather     Polycystic ovary syndrome Sister          Current Outpatient Medications   Medication Sig Dispense Refill    acyclovir (ZOVIRAX) 800 MG tablet Take 1 tablet (800 mg) by mouth 2 times daily For 5 days. 10 tablet 2    albuterol (PROAIR HFA/PROVENTIL HFA/VENTOLIN HFA) 108 (90 Base) MCG/ACT inhaler Inhale 2 puffs into the lungs every 6 hours as needed for shortness of breath, wheezing or cough 18 g 3    allopurinol (ZYLOPRIM) 300 MG tablet Take 1 tablet (300 mg) by mouth daily 90 tablet 1    colchicine (COLCRYS) 0.6 MG tablet Take 2 tablets by mouth at onset of gout flare, ok to take 1 tablet by mouth after 4 hours if still in pain.  Then take 1 tablet twice a day until flare resolves. 90 tablet 1    escitalopram (LEXAPRO) 5 MG tablet Take 1 tablet (5 mg) by mouth daily 30 tablet 1    fluticasone  "(ARNUITY ELLIPTA) 100 MCG/ACT inhaler Inhale 1 puff into the lungs daily 30 each 2    indomethacin (INDOCIN) 50 MG capsule TAKE 1 CAPSULE (50 MG) BY MOUTH 2 TIMES DAILY (WITH MEALS) DURING GOUT FLAIR 90 capsule 0    losartan (COZAAR) 50 MG tablet Take 1 tablet (50 mg) by mouth daily 90 tablet 1    rimegepant (NURTEC) 75 MG ODT tablet Place 1 tablet (75 mg) under the tongue daily as needed for migraine Maximum of 1 tablet every 24 hours. 8 tablet 0    topiramate (TOPAMAX) 25 MG tablet Take 1 tablet (25 mg) by mouth at bedtime for 5 days, THEN 2 tablets (50 mg) at bedtime for 5 days, THEN 3 tablets (75 mg) at bedtime for 5 days, THEN 4 tablets (100 mg) at bedtime for 30 days. 150 tablet 0         Review of Systems  Constitutional, HEENT, cardiovascular, pulmonary, gi and gu systems are negative, except as otherwise noted.     Objective    Exam  BP (!) 130/108   Pulse 78   Temp 98.2  F (36.8  C) (Oral)   Resp 20   Ht 1.613 m (5' 3.5\")   Wt 112.1 kg (247 lb 3.2 oz)   LMP 05/14/2024   SpO2 97%   BMI 43.10 kg/m     Estimated body mass index is 43.1 kg/m  as calculated from the following:    Height as of this encounter: 1.613 m (5' 3.5\").    Weight as of this encounter: 112.1 kg (247 lb 3.2 oz).    Physical Exam  GENERAL: alert, no distress, obese, and fatigued  EYES: Eyes grossly normal to inspection, PERRL and conjunctivae and sclerae normal  HENT: ear canals and TM's normal, nose and mouth without ulcers or lesions  NECK: no adenopathy, no asymmetry, masses, or scars  RESP: lungs clear to auscultation - no rales, rhonchi or wheezes  BREAST: normal without masses, tenderness or nipple discharge and no palpable axillary masses or adenopathy  CV: regular rate and rhythm, normal S1 S2, no S3 or S4, no murmur, click or rub, no peripheral edema  ABDOMEN: soft, nontender, no hepatosplenomegaly, no masses and bowel sounds normal   (female) w/bimanual: normal female external genitalia, normal urethral meatus, normal " vaginal mucosa, and normal cervix/adnexa/uterus without masses or discharge  MS: no gross musculoskeletal defects noted, no edema  SKIN: no suspicious lesions or rashes  NEURO: Normal strength and tone, mentation intact and speech normal  PSYCH: mentation appears normal, affect flat, tearful, anxious, and fatigued        Signed Electronically by: Arelis Mera MD

## 2024-05-22 LAB
ANION GAP SERPL CALCULATED.3IONS-SCNC: 12 MMOL/L (ref 7–15)
BUN SERPL-MCNC: 11.7 MG/DL (ref 6–20)
CALCIUM SERPL-MCNC: 9.4 MG/DL (ref 8.6–10)
CHLORIDE SERPL-SCNC: 105 MMOL/L (ref 98–107)
CHOLEST SERPL-MCNC: 206 MG/DL
CREAT SERPL-MCNC: 0.81 MG/DL (ref 0.51–0.95)
DEPRECATED HCO3 PLAS-SCNC: 22 MMOL/L (ref 22–29)
EGFRCR SERPLBLD CKD-EPI 2021: >90 ML/MIN/1.73M2
FASTING STATUS PATIENT QL REPORTED: YES
FASTING STATUS PATIENT QL REPORTED: YES
GLUCOSE SERPL-MCNC: 109 MG/DL (ref 70–99)
HDLC SERPL-MCNC: 40 MG/DL
LDLC SERPL CALC-MCNC: 136 MG/DL
NONHDLC SERPL-MCNC: 166 MG/DL
POTASSIUM SERPL-SCNC: 4.4 MMOL/L (ref 3.4–5.3)
SODIUM SERPL-SCNC: 139 MMOL/L (ref 135–145)
TRIGL SERPL-MCNC: 148 MG/DL
TSH SERPL DL<=0.005 MIU/L-ACNC: 0.97 UIU/ML (ref 0.3–4.2)
URATE SERPL-MCNC: 11 MG/DL (ref 2.4–5.7)

## 2024-05-24 LAB
BKR LAB AP GYN ADEQUACY: NORMAL
BKR LAB AP GYN INTERPRETATION: NORMAL
BKR LAB AP HPV REFLEX: NORMAL
BKR LAB AP LMP: NORMAL
BKR LAB AP PREVIOUS ABNORMAL: NORMAL
PATH REPORT.COMMENTS IMP SPEC: NORMAL
PATH REPORT.COMMENTS IMP SPEC: NORMAL
PATH REPORT.RELEVANT HX SPEC: NORMAL

## 2024-07-15 ENCOUNTER — OFFICE VISIT (OUTPATIENT)
Dept: URGENT CARE | Facility: URGENT CARE | Age: 24
End: 2024-07-15
Payer: COMMERCIAL

## 2024-07-15 VITALS
RESPIRATION RATE: 20 BRPM | BODY MASS INDEX: 41.29 KG/M2 | OXYGEN SATURATION: 98 % | TEMPERATURE: 98.9 F | SYSTOLIC BLOOD PRESSURE: 151 MMHG | WEIGHT: 236.8 LBS | DIASTOLIC BLOOD PRESSURE: 105 MMHG | HEART RATE: 81 BPM

## 2024-07-15 DIAGNOSIS — R59.9 SWOLLEN LYMPH NODES: ICD-10-CM

## 2024-07-15 DIAGNOSIS — J02.9 SORE THROAT: Primary | ICD-10-CM

## 2024-07-15 LAB
DEPRECATED S PYO AG THROAT QL EIA: NEGATIVE
GROUP A STREP BY PCR: NOT DETECTED

## 2024-07-15 PROCEDURE — 87651 STREP A DNA AMP PROBE: CPT

## 2024-07-15 PROCEDURE — 99213 OFFICE O/P EST LOW 20 MIN: CPT

## 2024-07-15 NOTE — PROGRESS NOTES
ASSESSMENT:  (J02.9) Sore throat  (primary encounter diagnosis)  Plan: Streptococcus A Rapid Screen w/Reflex to PCR,         Group A Streptococcus PCR Throat Swab    (R59.9) Swollen lymph nodes    PLAN:  Informed the patient that the strep test is negative.  We discussed the need for her to get plenty rest, drink fluids and use Tylenol as needed for pain with the maximum dose being 4000 mg in a 24-hour period of time.  We also discussed using a cool compress to the area for pain and swelling.  Informed the patient she can try warm salt water gargles, hot/warm water or tea with honey and/or lemon and or Cepacol lozenges or spray for the sore throat.  Discussed the need to return to clinic with any new or worsening symptoms.  Patient acknowledged her understanding of the above plan.    The use of Dragon/[employer] dictation services may have been used to construct the content in this note; any grammatical or spelling errors are non-intentional. Please contact the author of this note directly if you are in need of any clarification.      CASH Collins CNP      SUBJECTIVE:   Honey Ramirez is a 24 year old female presenting with a chief complaint of left ear pain, sore throat and pain under her left side of her jaw.  Onset of symptoms was 3 day(s) ago.  Course of illness is worsening.    Patient denies: fever, runny nose, cough - non-productive, vomiting, and diarrhea  Treatment measures tried include None tried.  Predisposing factors include None.  Patient also indicates that she drank a lot of alcohol and was hung over one of the days over the weekend with excessive vomiting.    ROS:  Negative except noted above.    OBJECTIVE:  BP (!) 151/105 (BP Location: Left arm, Patient Position: Sitting, Cuff Size: Adult Large)   Pulse 81   Temp 98.9  F (37.2  C) (Tympanic)   Resp 20   Wt 107.4 kg (236 lb 12.8 oz)   LMP 06/24/2024 (Exact Date)   SpO2 98%   BMI 41.29 kg/m    GENERAL APPEARANCE: healthy,  alert and no distress  EYES: EOMI,  PERRL, conjunctiva clear  HENT: ear canals and TM's normal.  Nose and mouth without ulcers, erythema or lesions  NECK: left sided submandibular and anterior cervical lymphadenopathy with slight tenderness upon palpation  RESP: lungs clear to auscultation - no rales, rhonchi or wheezes  CV: regular rates and rhythm, normal S1 S2, no murmur noted  SKIN: no suspicious lesions or rashes    Rapid Strep test: Negative

## 2024-07-15 NOTE — PATIENT INSTRUCTIONS
Strep test is negative.  Get plenty of rest and drink fluids.  Can use Tylenol as needed for pain.  Maximum dose of Tylenol is 4000mg in a 24 hour period of time.  Use cool compress to the area for pain and swelling.  You can also try warm salt water gargles, hot/warm water or tea with honey and/or lemon and/or Cepacol lozenges or spray for your sore throat.

## 2024-08-03 DIAGNOSIS — F33.1 MODERATE EPISODE OF RECURRENT MAJOR DEPRESSIVE DISORDER (H): ICD-10-CM

## 2024-08-03 DIAGNOSIS — F41.9 ANXIETY: ICD-10-CM

## 2024-08-04 ENCOUNTER — MYC MEDICAL ADVICE (OUTPATIENT)
Dept: FAMILY MEDICINE | Facility: CLINIC | Age: 24
End: 2024-08-04
Payer: COMMERCIAL

## 2024-08-04 ASSESSMENT — PATIENT HEALTH QUESTIONNAIRE - PHQ9
10. IF YOU CHECKED OFF ANY PROBLEMS, HOW DIFFICULT HAVE THESE PROBLEMS MADE IT FOR YOU TO DO YOUR WORK, TAKE CARE OF THINGS AT HOME, OR GET ALONG WITH OTHER PEOPLE: SOMEWHAT DIFFICULT
SUM OF ALL RESPONSES TO PHQ QUESTIONS 1-9: 7
SUM OF ALL RESPONSES TO PHQ QUESTIONS 1-9: 7

## 2024-08-04 ASSESSMENT — ANXIETY QUESTIONNAIRES
5. BEING SO RESTLESS THAT IT IS HARD TO SIT STILL: MORE THAN HALF THE DAYS
7. FEELING AFRAID AS IF SOMETHING AWFUL MIGHT HAPPEN: MORE THAN HALF THE DAYS
2. NOT BEING ABLE TO STOP OR CONTROL WORRYING: NEARLY EVERY DAY
3. WORRYING TOO MUCH ABOUT DIFFERENT THINGS: NEARLY EVERY DAY
IF YOU CHECKED OFF ANY PROBLEMS ON THIS QUESTIONNAIRE, HOW DIFFICULT HAVE THESE PROBLEMS MADE IT FOR YOU TO DO YOUR WORK, TAKE CARE OF THINGS AT HOME, OR GET ALONG WITH OTHER PEOPLE: VERY DIFFICULT
GAD7 TOTAL SCORE: 18
6. BECOMING EASILY ANNOYED OR IRRITABLE: NEARLY EVERY DAY
8. IF YOU CHECKED OFF ANY PROBLEMS, HOW DIFFICULT HAVE THESE MADE IT FOR YOU TO DO YOUR WORK, TAKE CARE OF THINGS AT HOME, OR GET ALONG WITH OTHER PEOPLE?: VERY DIFFICULT
4. TROUBLE RELAXING: NEARLY EVERY DAY
7. FEELING AFRAID AS IF SOMETHING AWFUL MIGHT HAPPEN: MORE THAN HALF THE DAYS
GAD7 TOTAL SCORE: 18
1. FEELING NERVOUS, ANXIOUS, OR ON EDGE: MORE THAN HALF THE DAYS
GAD7 TOTAL SCORE: 18

## 2024-08-05 RX ORDER — ESCITALOPRAM OXALATE 5 MG/1
5 TABLET ORAL DAILY
Qty: 30 TABLET | Refills: 1 | OUTPATIENT
Start: 2024-08-05

## 2024-08-20 DIAGNOSIS — M10.00 IDIOPATHIC GOUT, UNSPECIFIED CHRONICITY, UNSPECIFIED SITE: ICD-10-CM

## 2024-08-20 DIAGNOSIS — I10 HYPERTENSION GOAL BP (BLOOD PRESSURE) < 140/90: ICD-10-CM

## 2024-08-21 RX ORDER — LOSARTAN POTASSIUM 50 MG/1
50 TABLET ORAL DAILY
Qty: 90 TABLET | Refills: 0 | Status: SHIPPED | OUTPATIENT
Start: 2024-08-21

## 2024-08-21 RX ORDER — ALLOPURINOL 300 MG/1
1 TABLET ORAL DAILY
Qty: 90 TABLET | Refills: 0 | Status: SHIPPED | OUTPATIENT
Start: 2024-08-21

## 2024-12-17 ENCOUNTER — NURSE TRIAGE (OUTPATIENT)
Dept: FAMILY MEDICINE | Facility: CLINIC | Age: 24
End: 2024-12-17
Payer: COMMERCIAL

## 2024-12-17 NOTE — TELEPHONE ENCOUNTER
"Reason for Disposition   MILD TO MODERATE constant pain lasting > 2 hours    Answer Assessment - Initial Assessment Questions  1. LOCATION: \"Where does it hurt?\"       Lower right quadrant, closer to pelvic region    2. RADIATION: \"Does the pain shoot anywhere else?\" (e.g., chest, back)      Localized to the right lower quadrant    3. ONSET: \"When did the pain begin?\" (e.g., minutes, hours or days ago)       Pretty consistent for past month, has painful periods.    4. SUDDEN: \"Gradual or sudden onset?\"      Gradual, was more dull and non-bothersome, today pain has increased    5. PATTERN \"Does the pain come and go, or is it constant?\"      Constant, has been intermittent    6. SEVERITY: \"How bad is the pain?\"  (e.g., Scale 1-10; mild, moderate, or severe)      Rates as a 4 on 0-10 pain scale    7. RECURRENT SYMPTOM: \"Have you ever had this type of stomach pain before?\" If Yes, ask: \"When was the last time?\" and \"What happened that time?\"       No    8. CAUSE: \"What do you think is causing the stomach pain?\"      Unknown    9. RELIEVING/AGGRAVATING FACTORS: \"What makes it better or worse?\" (e.g., antacids, bending or twisting motion, bowel movement)      Nothing makes it better, applying pressure makes pain worse    10. OTHER SYMPTOMS: \"Do you have any other symptoms?\" (e.g., back pain, diarrhea, fever, urination pain, vomiting)        Had some diarrhea this morning, but hasn't had any other episodes. Has history of back pain but it has been worse this past month    11. PREGNANCY: \"Is there any chance you are pregnant?\" \"When was your last menstrual period?\"        No, lmp ended about 2 weeks ago    Protocols used: Abdominal Pain - Female-A-OH    Reviewed disposition with patient, given length of symptoms, advised Urgent Care or ED evaluation tonight. Patient verbalized understanding and agrees with plan. Advised to call with questions or concerns. Kirk Corral, RN, BSN    "

## 2024-12-23 ENCOUNTER — OFFICE VISIT (OUTPATIENT)
Dept: URGENT CARE | Facility: URGENT CARE | Age: 24
End: 2024-12-23
Payer: COMMERCIAL

## 2024-12-23 VITALS
OXYGEN SATURATION: 100 % | DIASTOLIC BLOOD PRESSURE: 136 MMHG | SYSTOLIC BLOOD PRESSURE: 178 MMHG | TEMPERATURE: 97.6 F | BODY MASS INDEX: 40.24 KG/M2 | HEART RATE: 71 BPM | RESPIRATION RATE: 19 BRPM | WEIGHT: 230.8 LBS

## 2024-12-23 DIAGNOSIS — J02.9 SORE THROAT: Primary | ICD-10-CM

## 2024-12-23 LAB
DEPRECATED S PYO AG THROAT QL EIA: NEGATIVE
FLUAV AG SPEC QL IA: NEGATIVE
FLUBV AG SPEC QL IA: NEGATIVE
S PYO DNA THROAT QL NAA+PROBE: NOT DETECTED

## 2024-12-23 PROCEDURE — 87651 STREP A DNA AMP PROBE: CPT

## 2024-12-23 PROCEDURE — 87635 SARS-COV-2 COVID-19 AMP PRB: CPT

## 2024-12-23 PROCEDURE — 99214 OFFICE O/P EST MOD 30 MIN: CPT

## 2024-12-23 PROCEDURE — 87804 INFLUENZA ASSAY W/OPTIC: CPT

## 2024-12-23 RX ORDER — DEXAMETHASONE SODIUM PHOSPHATE 10 MG/ML
16 INJECTION INTRAMUSCULAR; INTRAVENOUS ONCE
Status: COMPLETED | OUTPATIENT
Start: 2024-12-23 | End: 2024-12-23

## 2024-12-23 RX ADMIN — DEXAMETHASONE SODIUM PHOSPHATE 16 MG: 10 INJECTION INTRAMUSCULAR; INTRAVENOUS at 13:49
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
## 2024-12-23 NOTE — LETTER
December 23, 2024      Honey Ramirez  [id number] [address] [id number]        To Whom It May Concern:    Honey Ramirez  was seen on 12/23/2024.  Please excuse her  until 12/25/2024 due to illness.        Sincerely,        CASH Crystal CNP    Electronically signed

## 2024-12-23 NOTE — PATIENT INSTRUCTIONS
We will do a dose of decadron today in clinic to help with throat swelling. Other things to help with symptoms:  1) Increase fluids and rest  2) Try Mucinex and Neti pot over the counter for congestion  3) Continue taking Tylenol/Ibuprofen for fever/pain relief as needed.  4) Salt water gargles and lozenges can be helpful for throat relief  5) Honey may be helpful for your cough  6) You will only be notified of the confirmatory strep results if they are positive.     If you ever feel like you are not able to tolerate foods or fluids, difficulty swallowing, getting short of breath, or have worsening symptoms, go to ER for further evaluation.

## 2024-12-24 ENCOUNTER — TELEPHONE (OUTPATIENT)
Dept: NURSING | Facility: CLINIC | Age: 24
End: 2024-12-24
Payer: COMMERCIAL

## 2024-12-24 LAB — SARS-COV-2 RNA RESP QL NAA+PROBE: POSITIVE

## 2024-12-24 NOTE — TELEPHONE ENCOUNTER
Patient classified as COVID treatment eligible by Epic high risk algorithm:  Yes    Coronavirus (COVID-19) Notification    Reason for call  Notify of POSITIVE COVID-19 lab result, assess symptoms,  review [facility] recommendations    Lab Result   Lab test for 2019-nCoV rRt-PCR or SARS-COV-2 PCR  Oropharyngeal AND/OR nasopharyngeal swabs were POSITIVE for 2019-nCoV RNA [OR] SARS-COV-2 RNA (COVID-19) RNA     We have been unable to reach patient by phone at this time to notify of their Positive COVID-19 result.    Left voicemail message requesting a call back to [phone] [facility] for results.        A Positive COVID-19 letter will be sent via [employer] or the mail.    Halina Hauser

## 2024-12-26 ASSESSMENT — ASTHMA QUESTIONNAIRES
QUESTION_1 LAST FOUR WEEKS HOW MUCH OF THE TIME DID YOUR ASTHMA KEEP YOU FROM GETTING AS MUCH DONE AT WORK, SCHOOL OR AT HOME: A LITTLE OF THE TIME
QUESTION_5 LAST FOUR WEEKS HOW WOULD YOU RATE YOUR ASTHMA CONTROL: SOMEWHAT CONTROLLED
ACT_TOTALSCORE: 16
QUESTION_2 LAST FOUR WEEKS HOW OFTEN HAVE YOU HAD SHORTNESS OF BREATH: MORE THAN ONCE A DAY
QUESTION_4 LAST FOUR WEEKS HOW OFTEN HAVE YOU USED YOUR RESCUE INHALER OR NEBULIZER MEDICATION (SUCH AS ALBUTEROL): ONCE A WEEK OR LESS
ACT_TOTALSCORE: 16
QUESTION_3 LAST FOUR WEEKS HOW OFTEN DID YOUR ASTHMA SYMPTOMS (WHEEZING, COUGHING, SHORTNESS OF BREATH, CHEST TIGHTNESS OR PAIN) WAKE YOU UP AT NIGHT OR EARLIER THAN USUAL IN THE MORNING: ONCE OR TWICE

## 2024-12-30 ENCOUNTER — ANCILLARY PROCEDURE (OUTPATIENT)
Dept: ULTRASOUND IMAGING | Facility: CLINIC | Age: 24
End: 2024-12-30
Attending: ADVANCED PRACTICE MIDWIFE
Payer: COMMERCIAL

## 2024-12-30 DIAGNOSIS — R10.31 ABDOMINAL PAIN, RIGHT LOWER QUADRANT: ICD-10-CM

## 2024-12-30 LAB — RADIOLOGIST FLAGS: NORMAL

## 2024-12-30 PROCEDURE — 76830 TRANSVAGINAL US NON-OB: CPT | Performed by: RADIOLOGY

## 2024-12-30 PROCEDURE — 76856 US EXAM PELVIC COMPLETE: CPT | Performed by: RADIOLOGY

## 2024-12-30 ASSESSMENT — PATIENT HEALTH QUESTIONNAIRE - PHQ9
10. IF YOU CHECKED OFF ANY PROBLEMS, HOW DIFFICULT HAVE THESE PROBLEMS MADE IT FOR YOU TO DO YOUR WORK, TAKE CARE OF THINGS AT HOME, OR GET ALONG WITH OTHER PEOPLE: VERY DIFFICULT
SUM OF ALL RESPONSES TO PHQ QUESTIONS 1-9: 11
SUM OF ALL RESPONSES TO PHQ QUESTIONS 1-9: 11

## 2024-12-31 ENCOUNTER — OFFICE VISIT (OUTPATIENT)
Dept: FAMILY MEDICINE | Facility: CLINIC | Age: 24
End: 2024-12-31
Payer: COMMERCIAL

## 2024-12-31 VITALS
DIASTOLIC BLOOD PRESSURE: 86 MMHG | OXYGEN SATURATION: 100 % | HEART RATE: 84 BPM | TEMPERATURE: 98.3 F | WEIGHT: 228.2 LBS | RESPIRATION RATE: 18 BRPM | BODY MASS INDEX: 38.96 KG/M2 | HEIGHT: 64 IN | SYSTOLIC BLOOD PRESSURE: 128 MMHG

## 2024-12-31 DIAGNOSIS — I10 HYPERTENSION GOAL BP (BLOOD PRESSURE) < 140/90: Primary | ICD-10-CM

## 2024-12-31 DIAGNOSIS — G43.709 CHRONIC MIGRAINE WITHOUT AURA WITHOUT STATUS MIGRAINOSUS, NOT INTRACTABLE: ICD-10-CM

## 2024-12-31 DIAGNOSIS — R73.03 PREDIABETES: ICD-10-CM

## 2024-12-31 DIAGNOSIS — N83.201 OVARIAN CYST, RIGHT: Primary | ICD-10-CM

## 2024-12-31 PROBLEM — B00.9 HERPES SIMPLEX TYPE 1 ANTIBODY POSITIVE: Status: RESOLVED | Noted: 2023-11-10 | Resolved: 2024-12-31

## 2024-12-31 LAB
EST. AVERAGE GLUCOSE BLD GHB EST-MCNC: 117 MG/DL
HBA1C MFR BLD: 5.7 % (ref 0–5.6)

## 2024-12-31 PROCEDURE — 83036 HEMOGLOBIN GLYCOSYLATED A1C: CPT | Performed by: FAMILY MEDICINE

## 2024-12-31 PROCEDURE — 99214 OFFICE O/P EST MOD 30 MIN: CPT | Mod: 25 | Performed by: FAMILY MEDICINE

## 2024-12-31 PROCEDURE — 90677 PCV20 VACCINE IM: CPT | Performed by: FAMILY MEDICINE

## 2024-12-31 PROCEDURE — 36415 COLL VENOUS BLD VENIPUNCTURE: CPT | Performed by: FAMILY MEDICINE

## 2024-12-31 PROCEDURE — 90471 IMMUNIZATION ADMIN: CPT | Performed by: FAMILY MEDICINE

## 2024-12-31 RX ORDER — LOSARTAN POTASSIUM 25 MG/1
25 TABLET ORAL DAILY
Qty: 90 TABLET | Refills: 1 | Status: SHIPPED | OUTPATIENT
Start: 2024-12-31

## 2024-12-31 RX ORDER — AMITRIPTYLINE HYDROCHLORIDE 10 MG/1
TABLET ORAL
Qty: 200 TABLET | Refills: 0 | Status: SHIPPED | OUTPATIENT
Start: 2024-12-31 | End: 2025-02-19

## 2024-12-31 ASSESSMENT — PAIN SCALES - GENERAL: PAINLEVEL_OUTOF10: NO PAIN (0)

## 2024-12-31 NOTE — PROGRESS NOTES
"0  Assessment & Plan     (I10) Hypertension goal BP (blood pressure) < 140/90  (primary encounter diagnosis)  Comment:  improved  Plan: losartan (COZAAR) 25 MG tablet        Continue on losartan 25 mg daily,   Discussed that uncontrolled bp can contribute to her headaches    (G43.289) Chronic migraine without aura without status migrainosus, not intractable  Comment: not controlled  Plan: Adult Neurology  Referral,         amitriptyline (ELAVIL) 10 MG tablet        Start amitriptyline taper, Discussed potential side effects and actions to take if they occur.   Refer to neurology    (R73.03) Prediabetes  Comment: due  Plan: Hemoglobin A1c            The longitudinal plan of care for the diagnosis(es)/condition(s) as documented were addressed during this visit. Due to the added complexity in care, I will continue to support Honey in the subsequent management and with ongoing continuity of care.      BMI  Estimated body mass index is 39.79 kg/m  as calculated from the following:    Height as of this encounter: 1.613 m (5' 3.5\").    Weight as of this encounter: 103.5 kg (228 lb 3.2 oz).   Weight management plan: Discussed healthy diet and exercise guidelines      See Patient Instructions    Subjective   Honey is a 24 year old, presenting for the following health issues:  Hypertension        12/31/2024     9:20 AM   Additional Questions   Roomed by Pool NUNES LPN   Accompanied by Self         12/31/2024     9:20 AM   Patient Reported Additional Medications   Patient reports taking the following new medications None     History of Present Illness       Hypertension: She presents for follow up of hypertension.  She does not check blood pressure  regularly outside of the clinic. Outside blood pressures have been over 140/90. She follows a low salt diet.     She eats 2-3 servings of fruits and vegetables daily.She consumes 1 sweetened beverage(s) daily.She exercises with enough effort to increase her heart rate 9 or " "less minutes per day.  She exercises with enough effort to increase her heart rate 3 or less days per week. She is missing 5 dose(s) of medications per week.  She is not taking prescribed medications regularly due to remembering to take.     Pt with recent covid, ED visit for chest pain.  Work up negative other than htn.  Given losartan 25 mg, did not fill due to cost.  Pt had some old tablets at home.  Needs refill  Bp to goal today.    Due for recheck of A1c    Trial of topiramate for migraine prevention, completed taper to 100 mg daily and saw no change in headache pattern.  Has not tried other preventive meds.  Continues to have daily ha and some are very severe and require her to stop what she is doing and seek a dark room.  Has never seen neurology.              Review of Systems  Constitutional, HEENT, cardiovascular, pulmonary, gi and gu systems are negative, except as otherwise noted.      Objective    /86   Pulse 84   Temp 98.3  F (36.8  C) (Tympanic)   Resp 18   Ht 1.613 m (5' 3.5\")   Wt 103.5 kg (228 lb 3.2 oz)   LMP 11/25/2024 (Exact Date)   SpO2 100%   BMI 39.79 kg/m    Body mass index is 39.79 kg/m .  Physical Exam   GENERAL: alert and no distress  EYES: Eyes grossly normal to inspection, PERRL and conjunctivae and sclerae normal  MS: no gross musculoskeletal defects noted, no edema  SKIN: no suspicious lesions or rashes  NEURO: Normal strength and tone, sensory exam grossly normal, mentation intact, and cranial nerves 2-12 intact  PSYCH: mentation appears normal, affect normal/bright            Signed Electronically by: Arelis Mera MD    "

## 2025-01-13 ENCOUNTER — TRANSFERRED RECORDS (OUTPATIENT)
Dept: HEALTH INFORMATION MANAGEMENT | Facility: CLINIC | Age: 25
End: 2025-01-13
Payer: COMMERCIAL

## 2025-01-27 ENCOUNTER — NURSE TRIAGE (OUTPATIENT)
Dept: FAMILY MEDICINE | Facility: CLINIC | Age: 25
End: 2025-01-27
Payer: COMMERCIAL

## 2025-01-27 ASSESSMENT — ASTHMA QUESTIONNAIRES
QUESTION_4 LAST FOUR WEEKS HOW OFTEN HAVE YOU USED YOUR RESCUE INHALER OR NEBULIZER MEDICATION (SUCH AS ALBUTEROL): ONE OR TWO TIMES PER DAY
QUESTION_3 LAST FOUR WEEKS HOW OFTEN DID YOUR ASTHMA SYMPTOMS (WHEEZING, COUGHING, SHORTNESS OF BREATH, CHEST TIGHTNESS OR PAIN) WAKE YOU UP AT NIGHT OR EARLIER THAN USUAL IN THE MORNING: NOT AT ALL
QUESTION_5 LAST FOUR WEEKS HOW WOULD YOU RATE YOUR ASTHMA CONTROL: WELL CONTROLLED
QUESTION_1 LAST FOUR WEEKS HOW MUCH OF THE TIME DID YOUR ASTHMA KEEP YOU FROM GETTING AS MUCH DONE AT WORK, SCHOOL OR AT HOME: A LITTLE OF THE TIME
ACT_TOTALSCORE: 16
QUESTION_2 LAST FOUR WEEKS HOW OFTEN HAVE YOU HAD SHORTNESS OF BREATH: MORE THAN ONCE A DAY
ACT_TOTALSCORE: 16

## 2025-01-27 ASSESSMENT — PATIENT HEALTH QUESTIONNAIRE - PHQ9
SUM OF ALL RESPONSES TO PHQ QUESTIONS 1-9: 5
SUM OF ALL RESPONSES TO PHQ QUESTIONS 1-9: 5
10. IF YOU CHECKED OFF ANY PROBLEMS, HOW DIFFICULT HAVE THESE PROBLEMS MADE IT FOR YOU TO DO YOUR WORK, TAKE CARE OF THINGS AT HOME, OR GET ALONG WITH OTHER PEOPLE: SOMEWHAT DIFFICULT

## 2025-01-27 NOTE — TELEPHONE ENCOUNTER
This encounter is being created due to the "[employer] message dated 1/27/2025 as written below:   Honey CHURCH [place] [facility] [place] [facility] (supporting Arelis Mera MD)13 hours ago (1:27 AM)       Hi, so for about a week now I haven't been able to go poop properly like I used to be able to and it's become an issue and I'm having stomach pain and it almost feels like it's stuck. It is only coming out in bits and pieces but I can hear gas moving around my stomach every day now and I am feeling bloated. I've tried eating things with more fiber everyday since and nothing is working. I've also tried a laxative and nothing is working. What should or can I do?   Reason for Disposition    Rectal pain or fullness from fecal impaction (rectum full of stool) and NOT better after SITZ bath, suppository or enema    Additional Information    Negative: Vomiting bile (green color)    Negative: Patient sounds very sick or weak to the triager    Negative: Constant abdominal pain lasting > 2 hours    Negative: Vomiting and abdomen looks much more swollen than usual    Protocols used: Constipation-A-OH

## 2025-01-27 NOTE — TELEPHONE ENCOUNTER
Returned call to patient. Updated that is okay to wait till tomorrow for scheduled appointment to discuss symptoms and concerns, per Dr. Mera.  Patient agreed.      Bridgette Canales RN  Clinical Triage/Primary Care  [facility]

## 2025-01-27 NOTE — TELEPHONE ENCOUNTER
Provider:  I have made an appointment for Honey tomorrow to arrive by 8:10 am. Disposition is to be seen in office today. Is it reasonable she be seen tomorrow and not in urgent care today for her constipation? She was advised to try prune juice in the meantime on top of the other items she has tried. Thank you. Norma Jack R.N.    Patient reports that this has been going on since 1/20/2025 and not 7/2024 as stated in her MyChart. She is having abdominal pain but it is on and off. When she has pain it is rated 1-2/10 and about twice a day.     Nurse Triage SBAR    Is this a 2nd Level Triage? YES, LICENSED PRACTITIONER REVIEW IS REQUIRED    Situation:   Patient has experienced constipation since 1/20/2025.     Background:   I've tried eating things with more fiber everyday since and nothing is working. I've also tried a laxative and nothing is working.   Can hear gas moving around my stomach every day now and I am feeling bloated.  She is having abdominal pain but it is on and off. When she has pain it is rated 1-2/10 and about twice a day.   She was able to go today a little bit. It just feels stuck towards night time   She exercises every day  No nausea or vommiting   Usually have a bowel movement everyday   She has tried tea and pretty seeds and miralax and a basic over the counter stool softener that I just tried once.   No fever and no chills either and she only drinks water everyday. Lots of water   No dark stools.    Assessment: Needs assessment    Protocol Recommended Disposition:   See in Office Today    Recommendation: Can this wait until tomorrow.      Routed to provider    Does the patient meet one of the following criteria for ADS visit consideration? 16+ years old, with an [facility] PCP     TIP  Providers, please consider if this condition is appropriate for management at one of our Acute and Diagnostic Services sites.     If patient is a good candidate, please use dotphrase <dot>triageresponse and select  Refer to ADS to document.

## 2025-01-28 ENCOUNTER — OFFICE VISIT (OUTPATIENT)
Dept: FAMILY MEDICINE | Facility: CLINIC | Age: 25
End: 2025-01-28
Payer: COMMERCIAL

## 2025-01-28 ENCOUNTER — TELEPHONE (OUTPATIENT)
Dept: FAMILY MEDICINE | Facility: CLINIC | Age: 25
End: 2025-01-28

## 2025-01-28 ENCOUNTER — ANCILLARY PROCEDURE (OUTPATIENT)
Dept: GENERAL RADIOLOGY | Facility: CLINIC | Age: 25
End: 2025-01-28
Attending: FAMILY MEDICINE
Payer: COMMERCIAL

## 2025-01-28 VITALS
OXYGEN SATURATION: 100 % | BODY MASS INDEX: 37.87 KG/M2 | HEART RATE: 74 BPM | RESPIRATION RATE: 18 BRPM | HEIGHT: 64 IN | SYSTOLIC BLOOD PRESSURE: 136 MMHG | WEIGHT: 221.8 LBS | DIASTOLIC BLOOD PRESSURE: 98 MMHG | TEMPERATURE: 97.9 F

## 2025-01-28 DIAGNOSIS — R14.0 ABDOMINAL BLOATING: ICD-10-CM

## 2025-01-28 DIAGNOSIS — K59.09 OTHER CONSTIPATION: ICD-10-CM

## 2025-01-28 DIAGNOSIS — Z11.1 SCREENING EXAMINATION FOR PULMONARY TUBERCULOSIS: ICD-10-CM

## 2025-01-28 DIAGNOSIS — K59.09 OTHER CONSTIPATION: Primary | ICD-10-CM

## 2025-01-28 PROBLEM — E66.812 CLASS 2 DRUG-INDUCED OBESITY WITH SERIOUS COMORBIDITY AND BODY MASS INDEX (BMI) OF 38.0 TO 38.9 IN ADULT: Status: ACTIVE | Noted: 2024-05-21

## 2025-01-28 PROBLEM — F33.1 MODERATE EPISODE OF RECURRENT MAJOR DEPRESSIVE DISORDER (H): Status: RESOLVED | Noted: 2023-11-09 | Resolved: 2025-01-28

## 2025-01-28 PROBLEM — E66.01 CLASS 2 SEVERE OBESITY DUE TO EXCESS CALORIES WITH SERIOUS COMORBIDITY AND BODY MASS INDEX (BMI) OF 38.0 TO 38.9 IN ADULT (H): Status: RESOLVED | Noted: 2024-05-21 | Resolved: 2025-01-28

## 2025-01-28 PROBLEM — E66.01 CLASS 2 SEVERE OBESITY DUE TO EXCESS CALORIES WITH SERIOUS COMORBIDITY AND BODY MASS INDEX (BMI) OF 38.0 TO 38.9 IN ADULT (H): Status: ACTIVE | Noted: 2024-05-21

## 2025-01-28 PROBLEM — E66.812 CLASS 2 SEVERE OBESITY DUE TO EXCESS CALORIES WITH SERIOUS COMORBIDITY AND BODY MASS INDEX (BMI) OF 38.0 TO 38.9 IN ADULT (H): Status: RESOLVED | Noted: 2024-05-21 | Resolved: 2025-01-28

## 2025-01-28 PROBLEM — E66.1 CLASS 2 DRUG-INDUCED OBESITY WITH SERIOUS COMORBIDITY AND BODY MASS INDEX (BMI) OF 38.0 TO 38.9 IN ADULT: Status: ACTIVE | Noted: 2024-05-21

## 2025-01-28 PROCEDURE — 99213 OFFICE O/P EST LOW 20 MIN: CPT | Performed by: FAMILY MEDICINE

## 2025-01-28 PROCEDURE — G2211 COMPLEX E/M VISIT ADD ON: HCPCS | Performed by: FAMILY MEDICINE

## 2025-01-28 PROCEDURE — 86481 TB AG RESPONSE T-CELL SUSP: CPT | Performed by: FAMILY MEDICINE

## 2025-01-28 PROCEDURE — 74019 RADEX ABDOMEN 2 VIEWS: CPT | Mod: TC | Performed by: RADIOLOGY

## 2025-01-28 PROCEDURE — 36415 COLL VENOUS BLD VENIPUNCTURE: CPT | Performed by: FAMILY MEDICINE

## 2025-01-28 RX ORDER — IBUPROFEN 800 MG/1
800 TABLET, FILM COATED ORAL EVERY 6 HOURS PRN
COMMUNITY

## 2025-01-28 ASSESSMENT — PAIN SCALES - GENERAL: PAINLEVEL_OUTOF10: NO PAIN (0)

## 2025-01-28 NOTE — PROGRESS NOTES
Assessment & Plan     (K59.09) Other constipation  (primary encounter diagnosis)  (R14.0) Abdominal bloating  Comment: no obvious cause  Plan: XR Abdomen 2 Views        Can use Magnesium citrate for clean out, then start miralax daily. Reviewed that miralax takes 3-4 days for full effect    (Z11.1) Screening examination for pulmonary tuberculosis  Comment: requesting screening for work  Plan: Quantiferon TB Gold Plus            The longitudinal plan of care for the diagnosis(es)/condition(s) as documented were addressed during this visit. Due to the added complexity in care, I will continue to support Honey in the subsequent management and with ongoing continuity of care.      Nicotine/Tobacco Cessation  She reports that she has been smoking cigarettes and vaping device. She has quit using smokeless tobacco.  Nicotine/Tobacco Cessation Plan  Information offered: Patient not interested at this time        See Patient Instructions    Mariel Méndez is a 24 year old, presenting for the following health issues:  Constipation and LAB REQUEST (Quantiferon Gold TB )        1/28/2025     8:15 AM   Additional Questions   Roomed by Pool NUNES LPN   Accompanied by Self         1/28/2025     8:15 AM   Patient Reported Additional Medications   Patient reports taking the following new medications None     History of Present Illness       Reason for visit:  Constipation  Symptom onset:  3-7 days ago  Symptoms include:  Bloating, constipation, abdominal pain  Symptom intensity:  Mild  Symptom progression:  Staying the same  Had these symptoms before:  No  What makes it worse:  No  What makes it better:  No She is missing 2 dose(s) of medications per week.  She is not taking prescribed medications regularly due to remembering to take.     Passing gas,   Abdomen feels bloated and that gas or stool is trapped,  Having some stools loose and some firm, stool comes out in small pieces . Has to strain to have BM, then feels burning  "and some pain with pushing.      Constipation  Onset/Duration: 7 days ago  Description:  Frequency of bowel movements: on a daily basis  Consistency of stool: Type 1/Type 5 on [place] Chart   Progression of Symptoms: same  Accompanying signs and symptoms:    Abdominal pain: No   Rectal pain: Only while passing stool    Blood in stool: No   Nausea/Vomiting: No   Weight loss or gain: Mild weight loss   History:   Similar problems in past: No  History of abdominal surgery: No  Chronic laxative use: No  New medications: YES, Amitriptyline   Precipitating or alleviating factors: Increasing fiber intake   Therapies tried and outcome: Migue seeds, increased fluid, walking, Miralax one time, stool softener.     No medication or food changes          Review of Systems  Constitutional, HEENT, cardiovascular, pulmonary, gi and gu systems are negative, except as otherwise noted.      Objective    BP (!) 136/98   Pulse 74   Temp 97.9  F (36.6  C) (Tympanic)   Resp 18   Ht 1.613 m (5' 3.5\")   Wt 100.6 kg (221 lb 12.8 oz)   LMP 11/25/2024 (Exact Date)   SpO2 100%   BMI 38.67 kg/m    Body mass index is 38.67 kg/m .  Physical Exam   GENERAL: alert and no distress  EYES: Eyes grossly normal to inspection, PERRL and conjunctivae and sclerae normal  ABDOMEN: soft, nontender, no hepatosplenomegaly, no masses and bowel sounds normal  MS: no gross musculoskeletal defects noted, no edema  SKIN: no suspicious lesions or rashes  PSYCH: mentation appears normal, affect normal/bright    Xray abdomen: moderate stool throughout the colon, no AF levels, no evidence of obstruction.        Signed Electronically by: Arelis Mera MD    "

## 2025-01-28 NOTE — TELEPHONE ENCOUNTER
Nursing team received an Asthma flow sheet (ACT) update on patient via In-basket alert. Score is 15, noting Asthma may not be currently controlled.  Patient is in office now at this time for appointment with PCP and completed questionnaire at time of the visit. Provider to review and address.    Bridgette Canales RN  Clinical Triage/Primary Care  [facility]

## 2025-01-28 NOTE — PATIENT INSTRUCTIONS
1 bottle of magnesium citrate.  Follow label instructions.    The morning after you use magnesium citrate, start miralax 1/2 capful daily for at least 4 days then adjust as needed.     Simethicone can help with gas, (Gas X).

## 2025-01-29 LAB
GAMMA INTERFERON BACKGROUND BLD IA-ACNC: 0.04 IU/ML
M TB IFN-G BLD-IMP: NEGATIVE
M TB IFN-G CD4+ BCKGRND COR BLD-ACNC: 9.96 IU/ML
MITOGEN IGNF BCKGRD COR BLD-ACNC: 0 IU/ML
MITOGEN IGNF BCKGRD COR BLD-ACNC: 0.01 IU/ML
QUANTIFERON MITOGEN: 10 IU/ML
QUANTIFERON NIL TUBE: 0.04 IU/ML
QUANTIFERON TB1 TUBE: 0.04 IU/ML
QUANTIFERON TB2 TUBE: 0.05

## 2025-01-29 NOTE — CONFIDENTIAL NOTE
Reason for visit: Chronic migraine without aura without status migrainosus, not intractable    Referring Provider: Arelis Mera MD   [facility]   Office Visit Notes: 12/31/2024       All IMAGING   STATUS/LOCATION   DATE   MRI/MRA N/A     CT/CTA N/A     LABS Internal, External    EEG N/A    EMG N/A    NEUOROPSYCH   TEST: N/A

## 2025-02-04 DIAGNOSIS — R06.02 SHORTNESS OF BREATH: ICD-10-CM

## 2025-02-04 RX ORDER — ALBUTEROL SULFATE 90 UG/1
AEROSOL, METERED RESPIRATORY (INHALATION)
Qty: 18 G | Refills: 0 | Status: SHIPPED | OUTPATIENT
Start: 2025-02-04

## 2025-03-14 ENCOUNTER — PRE VISIT (OUTPATIENT)
Dept: NEUROLOGY | Facility: CLINIC | Age: 25
End: 2025-03-14

## 2025-04-03 NOTE — TELEPHONE ENCOUNTER
NEUROLOGY PRE- VISIT    RECORDS RECEVEIVED FROM: ref by JUDD FELDER   REASON FOR VISIT: Chronic migraine without aura without status migrainosus, not intractable   PROVIDER: Jasmin   DATE OF APPT: 04/15/2025     NOTES (FOR ALL VISITS) STATUS DETAILS   OFFICE NOTE from referring provider  Referral and notes in chart   OFFICE NOTE from other specialist     DISCHARGE SUMMARY from hospital     DISCHARGE REPORT from ER     OPERATIVE REPORT     EMG REPORT     EEG     Physical therapy       IMAGING  (FOR ALL VISITS) STATUS DETAILS   MRI (HEAD, NECK, SPINE)     XRAY (SPINE) *NEUROSURGERY*     CT (HEAD, NECK, SPINE)       Does patient have C2C?  Year last updated Action     YES   []      Please update at appointment if outdated more than 5 years       NO     [x]   N/A   Please complete C2C at appointment

## 2025-04-14 NOTE — PROGRESS NOTES
INITIAL NEUROLOGY CONSULTATION    DATE OF VISIT: 4/15/2025  CLINIC LOCATION: [facility]  MRN: [id number]  PATIENT NAME: Honey Ramirez  YOB: 2000    REASON FOR VISIT: No chief complaint on file.    HISTORY OF PRESENT ILLNESS:                                                    Ms. Honey Ramirez is 25 year old right handed female patient with past medical history of amenorrhea, prediabetes, hypertension, anxiety, asthma, gout, and headaches, who was seen today for headache management.    Per patient's report, ***.  She is *** mg of amitriptyline at bedtime and uses ibuprofen for acute therapy.  Previously tried topiramate up to 100 mg daily without impact on headaches.    Laboratory evaluation demonstrated elevated hemoglobin A1C of 5.7 in December 2024.  QuantiFERON gold plus was negative in January 2025.      According to Care Everywhere, head CT from 5/8/2007 ([facility]) was negative for acute intracranial pathology.    D-dimer was elevated at 0.6 in December 2024.  BMP at that time demonstrated elevated glucose of 150.  Troponin was negative.  CBC was unrevealing.  PAST MEDICAL/SURGICAL HISTORY:                                                    I personally reviewed patient's past medical and surgical history with the patient at today's visit.  MEDICATIONS:                                                    I personally reviewed patient's medications and allergies with the patient at today's visit.  ALLERGIES:                                                      Allergies   Allergen Reactions    Lisinopril Cough     EXAM:                                                    VITAL SIGNS:   There were no vitals taken for this visit.  Mini-Cog Assessment:       General: pt is in NAD, cooperative.  Skin: normal turgor, moist mucous membranes, no lesions/rashes noticed.  HEENT: ATNC, EOMI, PERRL, white sclera, normal conjunctiva, no nystagmus or ptosis. No carotid bruits  bilaterally.  Respiratory: lung sounds clear to auscultation bilaterally, no crackles, wheezes, rhonchi. Symmetric lung excursion, no accessory respiratory muscle use.  Cardiovascular: normal S1/S2, no murmurs/rubs/gallops.   Abdomen: Not distended.  : deferred.    Neurological:  Mental: alert, follows commands,  /5 with ***/3 on memory recall, no aphasia or dysarthria. Fund of knowledge is {MYAPPROPRIATE:[id number]}  Cranial Nerves:  CN II: visual acuity - able to accurately count fingers with each eye. Visual fields intact, fundi: discs sharp, no papilledema and normal vessels bilaterally.  CN III, IV, VI: EOM intact, pupils equal and reactive  CN V: facial sensation nl  CN VII: face symmetric, no facial droop  CN VIII: hearing normal  CN IX: palate elevation symmetric, uvula at midline  CN XI SCM normal, shoulder shrug nl  CN XII: tongue midline  Motor: Strength: 5/5 in all major groups of all extremities. Normal tone. No abnormal movements. No pronator drift b/l.  Reflexes: Triceps, biceps, brachioradialis, patellar, and achilles reflexes normal and symmetric. No clonus noted. Toes are down-going b/l.   Sensory: light touch, pinprick, and vibration intact. Romberg: negative.  Coordination: FNF and heel-shin tests intact b/l.   Gait:  Normal, able to tandem walk *** without difficulty.  DATA:   LABS/EEG/IMAGING/OTHER STUDIES: I reviewed pertinent medical records, as detailed in the history of present illness.  ASSESSMENT AND PLAN:      ASSESSMENT: Honey Ramirez is a 25 year old female patient with listed above past medical history, who presents with ***.    We had a detailed discussion with the patient regarding her presenting complaints.  The neurological exam today is ***.    DIAGNOSES:  No diagnosis found.  PLAN: There are no Patient Instructions on file for this visit.    Total Time: *** minutes spent on the date of the encounter doing chart review, history and exam, documentation and further activities per the  note.    Kaushik Castellanos MD  [facility]  (Chart documentation was completed in part with Dragon voice-recognition software. Even though reviewed, some grammatical, spelling, and word errors may remain.)

## 2025-04-15 ENCOUNTER — OFFICE VISIT (OUTPATIENT)
Dept: NEUROLOGY | Facility: CLINIC | Age: 25
End: 2025-04-15
Attending: FAMILY MEDICINE
Payer: COMMERCIAL

## 2025-04-15 ENCOUNTER — PRE VISIT (OUTPATIENT)
Dept: NEUROLOGY | Facility: CLINIC | Age: 25
End: 2025-04-15

## 2025-04-15 VITALS — HEART RATE: 86 BPM | OXYGEN SATURATION: 100 % | DIASTOLIC BLOOD PRESSURE: 118 MMHG | SYSTOLIC BLOOD PRESSURE: 168 MMHG

## 2025-04-15 DIAGNOSIS — G43.709 CHRONIC MIGRAINE WITHOUT AURA WITHOUT STATUS MIGRAINOSUS, NOT INTRACTABLE: Primary | ICD-10-CM

## 2025-04-15 DIAGNOSIS — G44.209 TENSION HEADACHE: ICD-10-CM

## 2025-04-15 DIAGNOSIS — M54.81 BILATERAL OCCIPITAL NEURALGIA: ICD-10-CM

## 2025-04-15 PROCEDURE — 3077F SYST BP >= 140 MM HG: CPT | Performed by: PSYCHIATRY & NEUROLOGY

## 2025-04-15 PROCEDURE — G2211 COMPLEX E/M VISIT ADD ON: HCPCS | Performed by: PSYCHIATRY & NEUROLOGY

## 2025-04-15 PROCEDURE — 99205 OFFICE O/P NEW HI 60 MIN: CPT | Performed by: PSYCHIATRY & NEUROLOGY

## 2025-04-15 PROCEDURE — 3080F DIAST BP >= 90 MM HG: CPT | Performed by: PSYCHIATRY & NEUROLOGY

## 2025-04-15 RX ORDER — SUMATRIPTAN SUCCINATE 25 MG/1
25 TABLET ORAL
Qty: 18 TABLET | Refills: 5 | Status: SHIPPED | OUTPATIENT
Start: 2025-04-15

## 2025-04-15 RX ORDER — ERENUMAB-AOOE 70 MG/ML
70 INJECTION SUBCUTANEOUS
Qty: 1 ML | Refills: 11 | Status: SHIPPED | OUTPATIENT
Start: 2025-04-15
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
## 2025-04-15 NOTE — PATIENT INSTRUCTIONS
AFTER VISIT SUMMARY (AVS):    At today's visit we thoroughly discussed various diagnostic possibilities for your symptoms that are most likely consistent with multifactorial headache disorder.  We also reviewed available treatment options and the plan, which includes:  Orders Placed This Encounter   Procedures    Physical Therapy  Referral       For headache prevention:  1.  Aimovig 70 mg subcutaneously every month for the next 3 months.  Please contact my clinic with any intolerable side effects and give me an update in 4-6 weeks after starting this medication.  2. Other future options include Effexor, propranolol, verapamil, other CGRP antagonists, and neurostimulator devices (Cefaly or Nerivio).  For acute headache therapy:  1.  Sumatriptan 25 to 50 mg at the onset of intolerable headache. Limit use to less than 9 days/month.  2. May also use Excedrin or Naproxen 500 mg, but take it with food and limit use of all analgesics to less that 15 days/month.    Reviewed non-pharmacological headache prevention measures include proper sleep hygiene, regular meals, adequate hydration, regular aerobic exercise, and stress reduction techniques.    Please keep the headache diary (paper or Migraine Zhen) and bring it to the next follow up visit or upload it via My Chart.    Next follow-up appointment is in the next 4 months or earlier if needed.    Please do not hesitate to call me with any questions or concerns.    Thanks.

## 2025-04-21 ENCOUNTER — TELEPHONE (OUTPATIENT)
Dept: FAMILY MEDICINE | Facility: CLINIC | Age: 25
End: 2025-04-21
Payer: COMMERCIAL

## 2025-04-21 DIAGNOSIS — E66.813 CLASS 3 SEVERE OBESITY DUE TO EXCESS CALORIES WITH SERIOUS COMORBIDITY AND BODY MASS INDEX (BMI) OF 40.0 TO 44.9 IN ADULT (H): ICD-10-CM

## 2025-04-21 DIAGNOSIS — R73.03 PREDIABETES: ICD-10-CM

## 2025-04-21 DIAGNOSIS — M10.00 IDIOPATHIC GOUT, UNSPECIFIED CHRONICITY, UNSPECIFIED SITE: ICD-10-CM

## 2025-04-21 DIAGNOSIS — I10 HYPERTENSION GOAL BP (BLOOD PRESSURE) < 140/90: Primary | ICD-10-CM

## 2025-04-21 NOTE — TELEPHONE ENCOUNTER
Patient is scheduled for annual wellness exam on 5/21/25.      Would you like pre-visit labs?         Are these labs fasting or non-fasting?      If no labs are needed, please close this encounter. If you are placing labs, please route back to [facility] so we can call and schedule.           Dewey Thomas

## 2025-05-16 SDOH — HEALTH STABILITY: PHYSICAL HEALTH: ON AVERAGE, HOW MANY DAYS PER WEEK DO YOU ENGAGE IN MODERATE TO STRENUOUS EXERCISE (LIKE A BRISK WALK)?: 0 DAYS

## 2025-05-16 SDOH — HEALTH STABILITY: PHYSICAL HEALTH: ON AVERAGE, HOW MANY MINUTES DO YOU ENGAGE IN EXERCISE AT THIS LEVEL?: 0 MIN

## 2025-05-16 ASSESSMENT — ASTHMA QUESTIONNAIRES
QUESTION_4 LAST FOUR WEEKS HOW OFTEN HAVE YOU USED YOUR RESCUE INHALER OR NEBULIZER MEDICATION (SUCH AS ALBUTEROL): ONCE A WEEK OR LESS
QUESTION_3 LAST FOUR WEEKS HOW OFTEN DID YOUR ASTHMA SYMPTOMS (WHEEZING, COUGHING, SHORTNESS OF BREATH, CHEST TIGHTNESS OR PAIN) WAKE YOU UP AT NIGHT OR EARLIER THAN USUAL IN THE MORNING: NOT AT ALL
ACT_TOTALSCORE: 17
QUESTION_5 LAST FOUR WEEKS HOW WOULD YOU RATE YOUR ASTHMA CONTROL: SOMEWHAT CONTROLLED
QUESTION_2 LAST FOUR WEEKS HOW OFTEN HAVE YOU HAD SHORTNESS OF BREATH: MORE THAN ONCE A DAY
QUESTION_1 LAST FOUR WEEKS HOW MUCH OF THE TIME DID YOUR ASTHMA KEEP YOU FROM GETTING AS MUCH DONE AT WORK, SCHOOL OR AT HOME: A LITTLE OF THE TIME

## 2025-05-16 ASSESSMENT — SOCIAL DETERMINANTS OF HEALTH (SDOH): HOW OFTEN DO YOU GET TOGETHER WITH FRIENDS OR RELATIVES?: THREE TIMES A WEEK

## 2025-05-20 ASSESSMENT — PATIENT HEALTH QUESTIONNAIRE - PHQ9
SUM OF ALL RESPONSES TO PHQ QUESTIONS 1-9: 13
SUM OF ALL RESPONSES TO PHQ QUESTIONS 1-9: 13
10. IF YOU CHECKED OFF ANY PROBLEMS, HOW DIFFICULT HAVE THESE PROBLEMS MADE IT FOR YOU TO DO YOUR WORK, TAKE CARE OF THINGS AT HOME, OR GET ALONG WITH OTHER PEOPLE: VERY DIFFICULT

## 2025-05-21 ENCOUNTER — OFFICE VISIT (OUTPATIENT)
Dept: FAMILY MEDICINE | Facility: CLINIC | Age: 25
End: 2025-05-21
Attending: FAMILY MEDICINE
Payer: COMMERCIAL

## 2025-05-21 ENCOUNTER — RESULTS FOLLOW-UP (OUTPATIENT)
Dept: FAMILY MEDICINE | Facility: CLINIC | Age: 25
End: 2025-05-21

## 2025-05-21 VITALS
BODY MASS INDEX: 38.07 KG/M2 | DIASTOLIC BLOOD PRESSURE: 94 MMHG | HEIGHT: 64 IN | HEART RATE: 82 BPM | RESPIRATION RATE: 19 BRPM | OXYGEN SATURATION: 100 % | WEIGHT: 223 LBS | TEMPERATURE: 98.2 F | SYSTOLIC BLOOD PRESSURE: 132 MMHG

## 2025-05-21 DIAGNOSIS — T14.8XXA ABRASION: ICD-10-CM

## 2025-05-21 DIAGNOSIS — Z11.3 SCREEN FOR STD (SEXUALLY TRANSMITTED DISEASE): ICD-10-CM

## 2025-05-21 DIAGNOSIS — M10.00 IDIOPATHIC GOUT, UNSPECIFIED CHRONICITY, UNSPECIFIED SITE: ICD-10-CM

## 2025-05-21 DIAGNOSIS — I10 HYPERTENSION GOAL BP (BLOOD PRESSURE) < 140/90: ICD-10-CM

## 2025-05-21 DIAGNOSIS — J45.40 MODERATE PERSISTENT ASTHMA WITHOUT COMPLICATION: ICD-10-CM

## 2025-05-21 DIAGNOSIS — R73.03 PREDIABETES: ICD-10-CM

## 2025-05-21 DIAGNOSIS — E66.813 CLASS 3 SEVERE OBESITY DUE TO EXCESS CALORIES WITH SERIOUS COMORBIDITY AND BODY MASS INDEX (BMI) OF 40.0 TO 44.9 IN ADULT (H): ICD-10-CM

## 2025-05-21 DIAGNOSIS — Z00.00 ROUTINE GENERAL MEDICAL EXAMINATION AT A HEALTH CARE FACILITY: Primary | ICD-10-CM

## 2025-05-21 PROBLEM — J45.30 MILD PERSISTENT ASTHMA WITHOUT COMPLICATION: Status: RESOLVED | Noted: 2024-05-21 | Resolved: 2025-05-21

## 2025-05-21 LAB — T PALLIDUM AB SER QL: NONREACTIVE

## 2025-05-21 PROCEDURE — 86780 TREPONEMA PALLIDUM: CPT | Performed by: FAMILY MEDICINE

## 2025-05-21 PROCEDURE — 36415 COLL VENOUS BLD VENIPUNCTURE: CPT | Performed by: FAMILY MEDICINE

## 2025-05-21 RX ORDER — ALBUTEROL SULFATE 90 UG/1
1-2 INHALANT RESPIRATORY (INHALATION) EVERY 4 HOURS PRN
Qty: 18 G | Refills: 3 | Status: SHIPPED | OUTPATIENT
Start: 2025-05-21

## 2025-05-21 RX ORDER — INDOMETHACIN 50 MG/1
50 CAPSULE ORAL 2 TIMES DAILY WITH MEALS
Qty: 90 CAPSULE | Refills: 1 | Status: SHIPPED | OUTPATIENT
Start: 2025-05-21

## 2025-05-21 RX ORDER — LOSARTAN POTASSIUM 50 MG/1
50 TABLET ORAL DAILY
Qty: 90 TABLET | Refills: 1 | Status: SHIPPED | OUTPATIENT
Start: 2025-05-21

## 2025-05-21 RX ORDER — LIDOCAINE 40 MG/G
CREAM TOPICAL 3 TIMES DAILY PRN
Qty: 15 G | Refills: 0 | Status: SHIPPED | OUTPATIENT
Start: 2025-05-21

## 2025-05-21 ASSESSMENT — PAIN SCALES - GENERAL: PAINLEVEL_OUTOF10: SEVERE PAIN (7)

## 2025-05-21 NOTE — PATIENT INSTRUCTIONS
Patient Education   Preventive Care Advice   This is general advice given by our system to help you stay healthy. However, your care team may have specific advice just for you. Please talk to your care team about your preventive care needs.  Nutrition  Eat 5 or more servings of fruits and vegetables each day.  Try wheat bread, brown rice and whole grain pasta (instead of white bread, rice, and pasta).  Get enough calcium and vitamin D. Check the label on foods and aim for 100% of the RDA (recommended daily allowance).  Lifestyle  Exercise at least 150 minutes each week  (30 minutes a day, 5 days a week).  Do muscle strengthening activities 2 days a week. These help control your weight and prevent disease.  No smoking.  Wear sunscreen to prevent skin cancer.  Have a dental exam and cleaning every 6 months.  Yearly exams  See your health care team every year to talk about:  Any changes in your health.  Any medicines your care team has prescribed.  Preventive care, family planning, and ways to prevent chronic diseases.  Shots (vaccines)   HPV shots (up to age 26), if you've never had them before.  Hepatitis B shots (up to age 59), if you've never had them before.  COVID-19 shot: Get this shot when it's due.  Flu shot: Get a flu shot every year.  Tetanus shot: Get a tetanus shot every 10 years.  Pneumococcal, hepatitis A, and RSV shots: Ask your care team if you need these based on your risk.  Shingles shot (for age 50 and up)  General health tests  Diabetes screening:  Starting at age 35, Get screened for diabetes at least every 3 years.  If you are younger than age 35, ask your care team if you should be screened for diabetes.  Cholesterol test: At age 39, start having a cholesterol test every 5 years, or more often if advised.  Bone density scan (DEXA): At age 50, ask your care team if you should have this scan for osteoporosis (brittle bones).  Hepatitis C: Get tested at least once in your life.  STIs (sexually  transmitted infections)  Before age 24: Ask your care team if you should be screened for STIs.  After age 24: Get screened for STIs if you're at risk. You are at risk for STIs (including HIV) if:  You are sexually active with more than one person.  You don't use condoms every time.  You or a partner was diagnosed with a sexually transmitted infection.  If you are at risk for HIV, ask about PrEP medicine to prevent HIV.  Get tested for HIV at least once in your life, whether you are at risk for HIV or not.  Cancer screening tests  Cervical cancer screening: If you have a cervix, begin getting regular cervical cancer screening tests starting at age 21.  Breast cancer scan (mammogram): If you've ever had breasts, begin having regular mammograms starting at age 40. This is a scan to check for breast cancer.  Colon cancer screening: It is important to start screening for colon cancer at age 45.  Have a colonoscopy test every 10 years (or more often if you're at risk) Or, ask your provider about stool tests like a FIT test every year or Cologuard test every 3 years.  To learn more about your testing options, visit:   .  For help making a decision, visit:   https://bit.ly/fm20906.  Prostate cancer screening test: If you have a prostate, ask your care team if a prostate cancer screening test (PSA) at age 55 is right for you.  Lung cancer screening: If you are a current or former smoker ages 50 to 80, ask your care team if ongoing lung cancer screenings are right for you.  For informational purposes only. Not to replace the advice of your health care provider. Copyright   2023 [facility]. All rights reserved. Clinically reviewed by the [facility]. [employer] [id number] - REV 01/24.  Learning About Stress  What is stress?     Stress is your body's response to a hard situation. Your body can have a physical, emotional, or mental response. Stress is a fact of life for most people, and it  affects everyone differently. What causes stress for you may not be stressful for someone else.  A lot of things can cause stress. You may feel stress when you go on a job interview, take a test, or run a race. This kind of short-term stress is normal and even useful. It can help you if you need to work hard or react quickly. For example, stress can help you finish an important job on time.  Long-term stress is caused by ongoing stressful situations or events. Examples of long-term stress include long-term health problems, ongoing problems at work, or conflicts in your family. Long-term stress can harm your health.  How does stress affect your health?  When you are stressed, your body responds as though you are in danger. It makes hormones that speed up your heart, make you breathe faster, and give you a burst of energy. This is called the fight-or-flight stress response. If the stress is over quickly, your body goes back to normal and no harm is done.  But if stress happens too often or lasts too long, it can have bad effects. Long-term stress can make you more likely to get sick, and it can make symptoms of some diseases worse. If you tense up when you are stressed, you may develop neck, shoulder, or low back pain. Stress is linked to high blood pressure and heart disease.  Stress also harms your emotional health. It can make you duvall, tense, or depressed. Your relationships may suffer, and you may not do well at work or school.  What can you do to manage stress?  You can try these things to help manage stress:   Do something active. Exercise or activity can help reduce stress. Walking is a great way to get started. Even everyday activities such as housecleaning or yard work can help.  Try yoga or neel chi. These techniques combine exercise and meditation. You may need some training at first to learn them.  Do something you enjoy. For example, listen to music or go to a movie. Practice your hobby or do volunteer  "work.  Meditate. This can help you relax, because you are not worrying about what happened before or what may happen in the future.  Do guided imagery. Imagine yourself in any setting that helps you feel calm. You can use online videos, books, or a teacher to guide you.  Do breathing exercises. For example:  From a standing position, bend forward from the waist with your knees slightly bent. Let your arms dangle close to the floor.  Breathe in slowly and deeply as you return to a standing position. Roll up slowly and lift your head last.  Hold your breath for just a few seconds in the standing position.  Breathe out slowly and bend forward from the waist.  Let your feelings out. Talk, laugh, cry, and express anger when you need to. Talking with supportive friends or family, a counselor, or a darling leader about your feelings is a healthy way to relieve stress. Avoid discussing your feelings with people who make you feel worse.  Write. It may help to write about things that are bothering you. This helps you find out how much stress you feel and what is causing it. When you know this, you can find better ways to cope.  What can you do to prevent stress?  You might try some of these things to help prevent stress:  Manage your time. This helps you find time to do the things you want and need to do.  Get enough sleep. Your body recovers from the stresses of the day while you are sleeping.  Get support. Your family, friends, and community can make a difference in how you experience stress.  Limit your news feed. Avoid or limit time on social media or news that may make you feel stressed.  Do something active. Exercise or activity can help reduce stress. Walking is a great way to get started.  Where can you learn more?  Go to https://www.[employer].net/patiented  Enter N032 in the search box to learn more about \"Learning About Stress.\"  Current as of: October 24, 2024  Content Version: 14.4 2024-2025 [employer].   Care instructions adapted under license by your healthcare professional. If you have questions about a medical condition or this instruction, always ask your healthcare professional. [email or web address] disclaims any warranty or liability for your use of this information.    Learning About Depression Screening  What is depression screening?  Depression screening is a way to see if you have depression symptoms. It may be done by a doctor or counselor. It's often part of a routine checkup. That's because your mental health is just as important as your physical health.  Depression is a mental health condition that affects how you feel, think, and act. You may:  Have less energy.  Lose interest in your daily activities.  Feel sad and grouchy for a long time.  Depression is very common. It affects people of all ages.  Many things can lead to depression. Some people become depressed after they have a stroke or find out they have a major illness like cancer or heart disease. The death of a loved one or a breakup may lead to depression. It can run in families. Most experts believe that a combination of inherited genes and stressful life events can cause it.  What happens during screening?  You may be asked to fill out a form about your depression symptoms. You and the doctor will discuss your answers. The doctor may ask you more questions to learn more about how you think, act, and feel.  What happens after screening?  If you have symptoms of depression, your doctor will talk to you about your options.  Doctors usually treat depression with medicines or counseling. Often, combining the two works best. Many people don't get help because they think that they'll get over the depression on their own. But people with depression may not get better unless they get treatment.  The cause of depression is not well understood. There may be many factors involved. But if you have depression, it's not your fault.  A serious  "symptom of depression is thinking about death or suicide. If you or someone you care about talks about this or about feeling hopeless, get help right away.  It's important to know that depression can be treated. Medicine, counseling, and self-care may help.  Where can you learn more?  Go to https://www.[employer].net/patiented  Enter T185 in the search box to learn more about \"Learning About Depression Screening.\"  Current as of: July 31, 2024  Content Version: 14.4    2343-6409 [employer].   Care instructions adapted under license by your healthcare professional. If you have questions about a medical condition or this instruction, always ask your healthcare professional. [employer] disclaims any warranty or liability for your use of this information.    Substance Use Disorder: Care Instructions  Overview     You can improve your life and health by stopping your use of alcohol or drugs. When you don't drink or use drugs, you may feel and sleep better. You may get along better with your family, friends, and coworkers. There are medicines and programs that can help with substance use disorder.  How can you care for yourself at home?  Here are some ways to help you stay sober and prevent relapse.  If you have been given medicine to help keep you sober or reduce your cravings, be sure to take it exactly as prescribed.  Talk to your doctor about programs that can help you stop using drugs or drinking alcohol.  Do not keep alcohol or drugs in your home.  Plan ahead. Think about what you'll say if other people ask you to drink or use drugs. Try not to spend time with people who drink or use drugs.  Use the time and money spent on drinking or drugs to do something that's important to you.  Preventing a relapse  Have a plan to deal with relapse. Learn to recognize changes in your thinking that lead you to drink or use drugs. Get help before you start to drink or use drugs again.  Try to stay away from " situations, friends, or places that may lead you to drink or use drugs.  If you feel the need to drink alcohol or use drugs again, seek help right away. Call a trusted friend or family member. Some people get support from organizations such as Narcotics Anonymous or [employer] or from treatment facilities.  If you relapse, get help as soon as you can. Some people make a plan with another person that outlines what they want that person to do for them if they relapse. The plan usually includes how to handle the relapse and who to notify in case of relapse.  Don't give up. Remember that a relapse doesn't mean that you have failed. Use the experience to learn the triggers that lead you to drink or use drugs. Then quit again. Recovery is a lifelong process. Many people have several relapses before they are able to quit for good.  Follow-up care is a key part of your treatment and safety. Be sure to make and go to all appointments, and call your doctor if you are having problems. It's also a good idea to know your test results and keep a list of the medicines you take.  When should you call for help?   Call 561  anytime you think you may need emergency care. For example, call if you or someone else:    Has overdosed or has withdrawal signs. Be sure to tell the emergency workers that you are or someone else is using or trying to quit using drugs. Overdose or withdrawal signs may include:  Losing consciousness.  Seizure.  Seeing or hearing things that aren't there (hallucinations).     Is thinking or talking about suicide or harming others.   Where to get help 24 hours a day, 7 days a week   If you or someone you know talks about suicide, self-harm, a mental health crisis, a substance use crisis, or any other kind of emotional distress, get help right away. You can:    Call the Suicide and Crisis Lifeline at 070.     Call 8-727-409-TALK (1-177.146.9537).     Text HOME to 476487 to access the Crisis Text Line.   Consider  "saving these numbers in your phone.  Go to [employer] for more information or to chat online.  Call your doctor now or seek immediate medical care if:    You are having withdrawal symptoms. These may include nausea or vomiting, sweating, shakiness, and anxiety.   Watch closely for changes in your health, and be sure to contact your doctor if:    You have a relapse.     You need more help or support to stop.   Where can you learn more?  Go to https://www.[employer]net/patiented  Enter H573 in the search box to learn more about \"Substance Use Disorder: Care Instructions.\"  Current as of: August 20, 2024  Content Version: 14.4    6630-4672 [employer].   Care instructions adapted under license by your healthcare professional. If you have questions about a medical condition or this instruction, always ask your healthcare professional. [employer] disclaims any warranty or liability for your use of this information.    Safer Sex: Care Instructions  Overview  Safer sex is a way to reduce your risk of getting a sexually transmitted infection (STI). It can also help prevent pregnancy.  Several products can help you practice safer sex and reduce your chance of STIs. One of the best is a condom. There are internal and external condoms. You can use a special rubber sheet (dental dam) for protection during oral sex. Disposable gloves can keep your hands from touching blood, semen, or other body fluids that can carry infections.  Remember that birth control methods such as diaphragms, IUDs, foams, and birth control pills do not stop you from getting STIs.  Follow-up care is a key part of your treatment and safety. Be sure to make and go to all appointments, and call your doctor if you are having problems. It's also a good idea to know your test results and keep a list of the medicines you take.  How can you care for yourself at home?  Think about getting vaccinated to help prevent hepatitis A, " "hepatitis B, and human papillomavirus (HPV). They can be spread through sex.  Use a condom every time you have sex. Use an external condom, which goes on the penis. Or use an internal condom, which goes into the vagina or anus.  Make sure you use the right size external condom. A condom that's too small can break easily. A condom that's too big can slip off during sex.  Use a new condom each time you have sex. Be careful not to poke a hole in the condom when you open the wrapper.  Don't use an internal condom and an external condom at the same time.  Never use petroleum jelly (such as Vaseline), grease, hand lotion, baby oil, or anything with oil in it. These products can make holes in the condom.  After intercourse, hold the edge of the condom as you remove it. This will help keep semen from spilling out of the condom.  Do not have sex with anyone who has symptoms of an STI, such as sores on the genitals or mouth.  Do not drink a lot of alcohol or use drugs before sex.  Limit your sex partners. Sex with one partner who has sex only with you can reduce your risk of getting an STI.  Don't share sex toys. But if you do share them, use a condom and clean the sex toys between each use.  Talk to any partners before you have sex. Talk about what you feel comfortable with and whether you have any boundaries with sex. And find out if your partner or partners may be at risk for any STI. Keep in mind that a person may be able to spread an STI even if they do not have symptoms. You and any partners may want to get tested for STIs.  Where can you learn more?  Go to https://www.[employer].net/patiented  Enter B608 in the search box to learn more about \"Safer Sex: Care Instructions.\"  Current as of: April 30, 2024  Content Version: 14.4    4276-4177 [facility] [employer].   Care instructions adapted under license by your healthcare professional. If you have questions about a medical condition or this instruction, always ask your " healthcare professional. [facility] disclaims any warranty or liability for your use of this information.
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
## 2025-05-22 ENCOUNTER — RESULTS FOLLOW-UP (OUTPATIENT)
Dept: FAMILY MEDICINE | Facility: CLINIC | Age: 25
End: 2025-05-22

## 2025-05-22 LAB
C TRACH DNA SPEC QL PROBE+SIG AMP: NEGATIVE
HBV SURFACE AG SERPL QL IA: NONREACTIVE
HCV AB SERPL QL IA: NONREACTIVE
HIV 1+2 AB+HIV1 P24 AG SERPL QL IA: NONREACTIVE
N GONORRHOEA DNA SPEC QL NAA+PROBE: NEGATIVE
SPECIMEN TYPE: NORMAL

## 2025-06-09 ENCOUNTER — TELEPHONE (OUTPATIENT)
Dept: FAMILY MEDICINE | Facility: CLINIC | Age: 25
End: 2025-06-09
Payer: COMMERCIAL

## 2025-06-09 NOTE — TELEPHONE ENCOUNTER
Patient Quality Outreach    Patient is due for the following:   Asthma  -  ACT needed  Hypertension -  BP check    Action(s) Taken:   Patient due to send home BP readings via [employer].  ACT was timed to send four weeks after last visit.     Type of outreach:    Sent [employer] message.    Questions for provider review:    None         Srini Adame CMA  Chart routed to None.